# Patient Record
Sex: MALE | Race: WHITE | Employment: OTHER | URBAN - METROPOLITAN AREA
[De-identification: names, ages, dates, MRNs, and addresses within clinical notes are randomized per-mention and may not be internally consistent; named-entity substitution may affect disease eponyms.]

---

## 2019-11-29 ENCOUNTER — APPOINTMENT (OUTPATIENT)
Dept: CT IMAGING | Age: 84
End: 2019-11-29
Payer: MEDICARE

## 2019-11-29 ENCOUNTER — HOSPITAL ENCOUNTER (EMERGENCY)
Age: 84
Discharge: HOME | End: 2019-11-29
Attending: EMERGENCY MEDICINE
Payer: MEDICARE

## 2019-11-29 VITALS
HEIGHT: 69 IN | HEART RATE: 76 BPM | DIASTOLIC BLOOD PRESSURE: 79 MMHG | TEMPERATURE: 98.4 F | OXYGEN SATURATION: 99 % | WEIGHT: 233.69 LBS | BODY MASS INDEX: 34.61 KG/M2 | RESPIRATION RATE: 17 BRPM | SYSTOLIC BLOOD PRESSURE: 151 MMHG

## 2019-11-29 DIAGNOSIS — N30.00 ACUTE CYSTITIS WITHOUT HEMATURIA: Primary | ICD-10-CM

## 2019-11-29 LAB
A/G RATIO: 1.4 (ref 1.1–2.2)
ALBUMIN SERPL-MCNC: 3.6 G/DL (ref 3.4–5)
ALP BLD-CCNC: 63 U/L (ref 40–129)
ALT SERPL-CCNC: 10 U/L (ref 10–40)
ANION GAP SERPL CALCULATED.3IONS-SCNC: 15 MMOL/L (ref 3–16)
AST SERPL-CCNC: 22 U/L (ref 15–37)
BILIRUB SERPL-MCNC: 0.4 MG/DL (ref 0–1)
BILIRUBIN URINE: ABNORMAL
BLOOD, URINE: ABNORMAL
BUN BLDV-MCNC: 15 MG/DL (ref 7–20)
CALCIUM SERPL-MCNC: 8.6 MG/DL (ref 8.3–10.6)
CHLORIDE BLD-SCNC: 104 MMOL/L (ref 99–110)
CLARITY: ABNORMAL
CO2: 23 MMOL/L (ref 21–32)
COLOR: ABNORMAL
CREAT SERPL-MCNC: 0.5 MG/DL (ref 0.8–1.3)
CRYSTALS, UA: ABNORMAL /HPF
EKG ATRIAL RATE: 86 BPM
EKG DIAGNOSIS: NORMAL
EKG P AXIS: 71 DEGREES
EKG P-R INTERVAL: 188 MS
EKG Q-T INTERVAL: 428 MS
EKG QRS DURATION: 154 MS
EKG QTC CALCULATION (BAZETT): 512 MS
EKG R AXIS: 84 DEGREES
EKG T AXIS: 52 DEGREES
EKG VENTRICULAR RATE: 86 BPM
EPITHELIAL CELLS, UA: ABNORMAL /HPF
GFR AFRICAN AMERICAN: >60
GFR NON-AFRICAN AMERICAN: >60
GLOBULIN: 2.5 G/DL
GLUCOSE BLD-MCNC: 119 MG/DL (ref 70–99)
GLUCOSE URINE: NEGATIVE MG/DL
HCT VFR BLD CALC: 38.1 % (ref 40.5–52.5)
HEMOGLOBIN: 12.7 G/DL (ref 13.5–17.5)
HYALINE CASTS: 5 /LPF (ref 0–8)
KETONES, URINE: ABNORMAL MG/DL
LEUKOCYTE ESTERASE, URINE: ABNORMAL
MCH RBC QN AUTO: 32.7 PG (ref 26–34)
MCHC RBC AUTO-ENTMCNC: 33.4 G/DL (ref 31–36)
MCV RBC AUTO: 97.9 FL (ref 80–100)
MICROSCOPIC EXAMINATION: YES
MUCUS: ABNORMAL /LPF
NITRITE, URINE: NEGATIVE
PDW BLD-RTO: 14.6 % (ref 12.4–15.4)
PH UA: 5 (ref 5–8)
PLATELET # BLD: 183 K/UL (ref 135–450)
PMV BLD AUTO: 8.3 FL (ref 5–10.5)
POTASSIUM REFLEX MAGNESIUM: 4.5 MMOL/L (ref 3.5–5.1)
PROTEIN UA: 30 MG/DL
RBC # BLD: 3.89 M/UL (ref 4.2–5.9)
RBC UA: 40 /HPF (ref 0–4)
RENAL EPITHELIAL, UA: ABNORMAL /HPF
SODIUM BLD-SCNC: 142 MMOL/L (ref 136–145)
SPECIFIC GRAVITY UA: 1.03 (ref 1–1.03)
TOTAL PROTEIN: 6.1 G/DL (ref 6.4–8.2)
TROPONIN: <0.01 NG/ML
URINE REFLEX TO CULTURE: YES
URINE TYPE: ABNORMAL
UROBILINOGEN, URINE: 1 E.U./DL
WBC # BLD: 7.5 K/UL (ref 4–11)
WBC UA: 271 /HPF (ref 0–5)

## 2019-11-29 PROCEDURE — 80053 COMPREHEN METABOLIC PANEL: CPT

## 2019-11-29 PROCEDURE — 84484 ASSAY OF TROPONIN QUANT: CPT

## 2019-11-29 PROCEDURE — 70450 CT HEAD/BRAIN W/O DYE: CPT

## 2019-11-29 PROCEDURE — 99284 EMERGENCY DEPT VISIT MOD MDM: CPT

## 2019-11-29 PROCEDURE — 85027 COMPLETE CBC AUTOMATED: CPT

## 2019-11-29 PROCEDURE — 93010 ELECTROCARDIOGRAM REPORT: CPT | Performed by: INTERNAL MEDICINE

## 2019-11-29 PROCEDURE — 87086 URINE CULTURE/COLONY COUNT: CPT

## 2019-11-29 PROCEDURE — 6370000000 HC RX 637 (ALT 250 FOR IP): Performed by: EMERGENCY MEDICINE

## 2019-11-29 PROCEDURE — 93005 ELECTROCARDIOGRAM TRACING: CPT | Performed by: EMERGENCY MEDICINE

## 2019-11-29 PROCEDURE — 36415 COLL VENOUS BLD VENIPUNCTURE: CPT

## 2019-11-29 PROCEDURE — 81001 URINALYSIS AUTO W/SCOPE: CPT

## 2019-11-29 RX ORDER — NITROFURANTOIN 25; 75 MG/1; MG/1
100 CAPSULE ORAL ONCE
Status: COMPLETED | OUTPATIENT
Start: 2019-11-29 | End: 2019-11-29

## 2019-11-29 RX ORDER — NITROFURANTOIN 25; 75 MG/1; MG/1
100 CAPSULE ORAL 2 TIMES DAILY
Qty: 14 CAPSULE | Refills: 0 | Status: SHIPPED | OUTPATIENT
Start: 2019-11-29 | End: 2019-12-03

## 2019-11-29 RX ADMIN — NITROFURANTOIN MONOHYDRATE/MACROCRYSTALLINE 100 MG: 25; 75 CAPSULE ORAL at 03:26

## 2019-11-29 ASSESSMENT — PAIN SCALES - GENERAL
PAINLEVEL_OUTOF10: 0

## 2019-11-30 LAB — URINE CULTURE, ROUTINE: NORMAL

## 2019-12-23 ENCOUNTER — OFFICE VISIT (OUTPATIENT)
Dept: CARDIOLOGY CLINIC | Age: 84
End: 2019-12-23
Payer: MEDICARE

## 2019-12-23 VITALS
OXYGEN SATURATION: 93 % | DIASTOLIC BLOOD PRESSURE: 60 MMHG | HEIGHT: 69 IN | HEART RATE: 77 BPM | SYSTOLIC BLOOD PRESSURE: 142 MMHG | BODY MASS INDEX: 34.51 KG/M2

## 2019-12-23 DIAGNOSIS — G47.30 SLEEP APNEA, UNSPECIFIED TYPE: ICD-10-CM

## 2019-12-23 DIAGNOSIS — R60.0 BILATERAL LEG EDEMA: Primary | ICD-10-CM

## 2019-12-23 DIAGNOSIS — I10 ESSENTIAL HYPERTENSION: ICD-10-CM

## 2019-12-23 DIAGNOSIS — I71.40 ABDOMINAL AORTIC ANEURYSM (AAA) WITHOUT RUPTURE: ICD-10-CM

## 2019-12-23 PROCEDURE — G8484 FLU IMMUNIZE NO ADMIN: HCPCS | Performed by: INTERNAL MEDICINE

## 2019-12-23 PROCEDURE — G8427 DOCREV CUR MEDS BY ELIG CLIN: HCPCS | Performed by: INTERNAL MEDICINE

## 2019-12-23 PROCEDURE — 1036F TOBACCO NON-USER: CPT | Performed by: INTERNAL MEDICINE

## 2019-12-23 PROCEDURE — 1123F ACP DISCUSS/DSCN MKR DOCD: CPT | Performed by: INTERNAL MEDICINE

## 2019-12-23 PROCEDURE — G8417 CALC BMI ABV UP PARAM F/U: HCPCS | Performed by: INTERNAL MEDICINE

## 2019-12-23 PROCEDURE — 99204 OFFICE O/P NEW MOD 45 MIN: CPT | Performed by: INTERNAL MEDICINE

## 2019-12-23 PROCEDURE — 4040F PNEUMOC VAC/ADMIN/RCVD: CPT | Performed by: INTERNAL MEDICINE

## 2019-12-23 RX ORDER — ALENDRONATE SODIUM 70 MG/1
70 TABLET ORAL
COMMUNITY
End: 2021-07-28

## 2019-12-23 RX ORDER — FUROSEMIDE 40 MG/1
40 TABLET ORAL DAILY
Qty: 30 TABLET | Refills: 5 | Status: SHIPPED | OUTPATIENT
Start: 2019-12-23 | End: 2020-06-12

## 2019-12-23 RX ORDER — FUROSEMIDE 20 MG/1
20 TABLET ORAL DAILY
COMMUNITY
End: 2019-12-23

## 2019-12-23 RX ORDER — LOSARTAN POTASSIUM 50 MG/1
50 TABLET ORAL 2 TIMES DAILY
COMMUNITY
End: 2019-12-23 | Stop reason: ALTCHOICE

## 2019-12-23 RX ORDER — OLMESARTAN MEDOXOMIL AND HYDROCHLOROTHIAZIDE 20/12.5 20; 12.5 MG/1; MG/1
1 TABLET ORAL 2 TIMES DAILY
Qty: 60 TABLET | Refills: 11 | Status: SHIPPED | OUTPATIENT
Start: 2019-12-23 | End: 2021-01-13 | Stop reason: SDUPTHER

## 2019-12-26 ENCOUNTER — TELEPHONE (OUTPATIENT)
Dept: CARDIOLOGY CLINIC | Age: 84
End: 2019-12-26

## 2019-12-26 LAB
ANION GAP SERPL CALCULATED.3IONS-SCNC: 7 MMOL/L (ref 6–18)
B-TYPE NATRIURETIC PEPTIDE: 46 PG/ML
B-TYPE NATRIURETIC PEPTIDE: 46 PG/ML (ref 0–77)
BUN BLDV-MCNC: 17 MG/DL
BUN BLDV-MCNC: 17 MG/DL (ref 8–26)
CALCIUM SERPL-MCNC: 8.9 MG/DL
CALCIUM SERPL-MCNC: 8.9 MG/DL (ref 8.5–10.5)
CHLORIDE BLD-SCNC: 103 MMOL/L
CHLORIDE BLD-SCNC: 104 MEQ/L (ref 101–111)
CO2: 32 MMOL/L
CO2: 32 MMOL/L (ref 24–36)
CREAT SERPL-MCNC: 0.65 MG/DL
CREAT SERPL-MCNC: 0.65 MG/DL (ref 0.64–1.27)
GFR AFRICAN AMERICAN: 102 ML/MIN/1.73 M2
GFR CALCULATED: NORMAL
GFR NON-AFRICAN AMERICAN: 88 ML/MIN/1.73 M2
GLUCOSE BLD-MCNC: 91 MG/DL
GLUCOSE BLD-MCNC: 91 MG/DL (ref 70–99)
POTASSIUM SERPL-SCNC: 3.3 MEQ/L (ref 3.6–5.1)
POTASSIUM SERPL-SCNC: 3.3 MMOL/L
SODIUM BLD-SCNC: 143 MEQ/L (ref 135–145)
SODIUM BLD-SCNC: 143 MMOL/L

## 2019-12-26 RX ORDER — POTASSIUM CHLORIDE 20 MEQ/1
20 TABLET, EXTENDED RELEASE ORAL DAILY
Qty: 30 TABLET | Refills: 3 | Status: SHIPPED | OUTPATIENT
Start: 2019-12-26 | End: 2020-02-11 | Stop reason: SDUPTHER

## 2019-12-27 ENCOUNTER — TELEPHONE (OUTPATIENT)
Dept: CARDIOLOGY CLINIC | Age: 84
End: 2019-12-27

## 2019-12-27 DIAGNOSIS — I10 ESSENTIAL HYPERTENSION: Primary | ICD-10-CM

## 2019-12-30 ENCOUNTER — APPOINTMENT (OUTPATIENT)
Dept: CT IMAGING | Age: 84
DRG: 871 | End: 2019-12-30
Payer: MEDICARE

## 2019-12-30 ENCOUNTER — APPOINTMENT (OUTPATIENT)
Dept: GENERAL RADIOLOGY | Age: 84
DRG: 871 | End: 2019-12-30
Payer: MEDICARE

## 2019-12-30 ENCOUNTER — HOSPITAL ENCOUNTER (INPATIENT)
Age: 84
LOS: 5 days | Discharge: HOME HEALTH | DRG: 871 | End: 2020-01-04
Attending: EMERGENCY MEDICINE | Admitting: INTERNAL MEDICINE
Payer: MEDICARE

## 2019-12-30 PROBLEM — R79.89 ELEVATED LACTIC ACID LEVEL: Status: ACTIVE | Noted: 2019-12-30

## 2019-12-30 PROBLEM — R53.1 GENERALIZED WEAKNESS: Status: ACTIVE | Noted: 2019-12-30

## 2019-12-30 PROBLEM — G93.41 ACUTE METABOLIC ENCEPHALOPATHY: Status: ACTIVE | Noted: 2019-12-30

## 2019-12-30 PROBLEM — E87.6 HYPOKALEMIA: Status: ACTIVE | Noted: 2019-12-30

## 2019-12-30 PROBLEM — E87.29 HIGH ANION GAP METABOLIC ACIDOSIS: Status: ACTIVE | Noted: 2019-12-30

## 2019-12-30 PROBLEM — A41.9 SEPSIS (HCC): Status: ACTIVE | Noted: 2019-12-30

## 2019-12-30 LAB
ALBUMIN SERPL-MCNC: 4.1 G/DL (ref 3.4–5)
ALP BLD-CCNC: 70 U/L (ref 40–129)
ALT SERPL-CCNC: 12 U/L (ref 10–40)
ANION GAP SERPL CALCULATED.3IONS-SCNC: 15 MMOL/L (ref 3–16)
AST SERPL-CCNC: 14 U/L (ref 15–37)
BASOPHILS ABSOLUTE: 0 K/UL (ref 0–0.2)
BASOPHILS RELATIVE PERCENT: 0.1 %
BILIRUB SERPL-MCNC: 1.2 MG/DL (ref 0–1)
BILIRUBIN DIRECT: 0.4 MG/DL (ref 0–0.3)
BILIRUBIN URINE: NEGATIVE
BILIRUBIN, INDIRECT: 0.8 MG/DL (ref 0–1)
BLOOD, URINE: NEGATIVE
BUN BLDV-MCNC: 15 MG/DL (ref 7–20)
CALCIUM SERPL-MCNC: 9.2 MG/DL (ref 8.3–10.6)
CHLORIDE BLD-SCNC: 97 MMOL/L (ref 99–110)
CLARITY: CLEAR
CO2: 28 MMOL/L (ref 21–32)
COLOR: YELLOW
CREAT SERPL-MCNC: 0.6 MG/DL (ref 0.8–1.3)
EOSINOPHILS ABSOLUTE: 0 K/UL (ref 0–0.6)
EOSINOPHILS RELATIVE PERCENT: 0.1 %
EPITHELIAL CELLS, UA: 1 /HPF (ref 0–5)
GFR AFRICAN AMERICAN: >60
GFR NON-AFRICAN AMERICAN: >60
GLUCOSE BLD-MCNC: 164 MG/DL (ref 70–99)
GLUCOSE URINE: NEGATIVE MG/DL
HCT VFR BLD CALC: 40.4 % (ref 40.5–52.5)
HEMOGLOBIN: 13.2 G/DL (ref 13.5–17.5)
HYALINE CASTS: 0 /LPF (ref 0–8)
KETONES, URINE: ABNORMAL MG/DL
LACTIC ACID: 3.2 MMOL/L (ref 0.4–2)
LACTIC ACID: 3.2 MMOL/L (ref 0.4–2)
LEUKOCYTE ESTERASE, URINE: ABNORMAL
LIPASE: 13 U/L (ref 13–60)
LYMPHOCYTES ABSOLUTE: 0.3 K/UL (ref 1–5.1)
LYMPHOCYTES RELATIVE PERCENT: 2.8 %
MAGNESIUM: 2 MG/DL (ref 1.8–2.4)
MCH RBC QN AUTO: 32.4 PG (ref 26–34)
MCHC RBC AUTO-ENTMCNC: 32.7 G/DL (ref 31–36)
MCV RBC AUTO: 99 FL (ref 80–100)
MICROSCOPIC EXAMINATION: YES
MONOCYTES ABSOLUTE: 1 K/UL (ref 0–1.3)
MONOCYTES RELATIVE PERCENT: 8 %
NEUTROPHILS ABSOLUTE: 10.7 K/UL (ref 1.7–7.7)
NEUTROPHILS RELATIVE PERCENT: 89 %
NITRITE, URINE: NEGATIVE
PDW BLD-RTO: 14.7 % (ref 12.4–15.4)
PH UA: 5 (ref 5–8)
PLATELET # BLD: 181 K/UL (ref 135–450)
PMV BLD AUTO: 8 FL (ref 5–10.5)
POTASSIUM REFLEX MAGNESIUM: 3.4 MMOL/L (ref 3.5–5.1)
PROTEIN UA: NEGATIVE MG/DL
RAPID INFLUENZA  B AGN: NEGATIVE
RAPID INFLUENZA A AGN: NEGATIVE
RBC # BLD: 4.09 M/UL (ref 4.2–5.9)
RBC UA: 2 /HPF (ref 0–4)
SODIUM BLD-SCNC: 140 MMOL/L (ref 136–145)
SPECIFIC GRAVITY UA: 1.01 (ref 1–1.03)
TOTAL PROTEIN: 7 G/DL (ref 6.4–8.2)
URINE REFLEX TO CULTURE: YES
URINE TYPE: ABNORMAL
UROBILINOGEN, URINE: 1 E.U./DL
WBC # BLD: 12 K/UL (ref 4–11)
WBC UA: 3 /HPF (ref 0–5)

## 2019-12-30 PROCEDURE — 71046 X-RAY EXAM CHEST 2 VIEWS: CPT

## 2019-12-30 PROCEDURE — 83690 ASSAY OF LIPASE: CPT

## 2019-12-30 PROCEDURE — 2580000003 HC RX 258: Performed by: EMERGENCY MEDICINE

## 2019-12-30 PROCEDURE — 6370000000 HC RX 637 (ALT 250 FOR IP): Performed by: INTERNAL MEDICINE

## 2019-12-30 PROCEDURE — 2580000003 HC RX 258: Performed by: INTERNAL MEDICINE

## 2019-12-30 PROCEDURE — 81001 URINALYSIS AUTO W/SCOPE: CPT

## 2019-12-30 PROCEDURE — 70450 CT HEAD/BRAIN W/O DYE: CPT

## 2019-12-30 PROCEDURE — 71250 CT THORAX DX C-: CPT

## 2019-12-30 PROCEDURE — 87040 BLOOD CULTURE FOR BACTERIA: CPT

## 2019-12-30 PROCEDURE — 83605 ASSAY OF LACTIC ACID: CPT

## 2019-12-30 PROCEDURE — 87804 INFLUENZA ASSAY W/OPTIC: CPT

## 2019-12-30 PROCEDURE — 96375 TX/PRO/DX INJ NEW DRUG ADDON: CPT

## 2019-12-30 PROCEDURE — 80048 BASIC METABOLIC PNL TOTAL CA: CPT

## 2019-12-30 PROCEDURE — 99285 EMERGENCY DEPT VISIT HI MDM: CPT

## 2019-12-30 PROCEDURE — 87150 DNA/RNA AMPLIFIED PROBE: CPT

## 2019-12-30 PROCEDURE — 85025 COMPLETE CBC W/AUTO DIFF WBC: CPT

## 2019-12-30 PROCEDURE — 80076 HEPATIC FUNCTION PANEL: CPT

## 2019-12-30 PROCEDURE — 6360000002 HC RX W HCPCS: Performed by: EMERGENCY MEDICINE

## 2019-12-30 PROCEDURE — 94640 AIRWAY INHALATION TREATMENT: CPT

## 2019-12-30 PROCEDURE — 83735 ASSAY OF MAGNESIUM: CPT

## 2019-12-30 PROCEDURE — 94761 N-INVAS EAR/PLS OXIMETRY MLT: CPT

## 2019-12-30 PROCEDURE — 6370000000 HC RX 637 (ALT 250 FOR IP): Performed by: EMERGENCY MEDICINE

## 2019-12-30 PROCEDURE — 96361 HYDRATE IV INFUSION ADD-ON: CPT

## 2019-12-30 PROCEDURE — 51702 INSERT TEMP BLADDER CATH: CPT

## 2019-12-30 PROCEDURE — 36415 COLL VENOUS BLD VENIPUNCTURE: CPT

## 2019-12-30 PROCEDURE — 2060000000 HC ICU INTERMEDIATE R&B

## 2019-12-30 PROCEDURE — 87086 URINE CULTURE/COLONY COUNT: CPT

## 2019-12-30 PROCEDURE — 96365 THER/PROPH/DIAG IV INF INIT: CPT

## 2019-12-30 RX ORDER — SODIUM CHLORIDE 0.9 % (FLUSH) 0.9 %
10 SYRINGE (ML) INJECTION PRN
Status: DISCONTINUED | OUTPATIENT
Start: 2019-12-30 | End: 2020-01-04 | Stop reason: HOSPADM

## 2019-12-30 RX ORDER — AMLODIPINE BESYLATE 5 MG/1
5 TABLET ORAL DAILY
Status: DISCONTINUED | OUTPATIENT
Start: 2019-12-31 | End: 2019-12-31

## 2019-12-30 RX ORDER — SODIUM CHLORIDE 9 MG/ML
INJECTION, SOLUTION INTRAVENOUS CONTINUOUS
Status: DISCONTINUED | OUTPATIENT
Start: 2019-12-30 | End: 2019-12-31

## 2019-12-30 RX ORDER — POTASSIUM CHLORIDE 750 MG/1
10 TABLET, FILM COATED, EXTENDED RELEASE ORAL DAILY
Status: DISCONTINUED | OUTPATIENT
Start: 2019-12-31 | End: 2020-01-04 | Stop reason: HOSPADM

## 2019-12-30 RX ORDER — FINASTERIDE 5 MG/1
5 TABLET, FILM COATED ORAL DAILY
Status: DISCONTINUED | OUTPATIENT
Start: 2019-12-31 | End: 2019-12-31 | Stop reason: ALTCHOICE

## 2019-12-30 RX ORDER — LOSARTAN POTASSIUM AND HYDROCHLOROTHIAZIDE 12.5; 5 MG/1; MG/1
1 TABLET ORAL 2 TIMES DAILY
Status: DISCONTINUED | OUTPATIENT
Start: 2019-12-30 | End: 2020-01-01

## 2019-12-30 RX ORDER — LANOLIN ALCOHOL/MO/W.PET/CERES
1000 CREAM (GRAM) TOPICAL DAILY
Status: DISCONTINUED | OUTPATIENT
Start: 2019-12-31 | End: 2020-01-04 | Stop reason: HOSPADM

## 2019-12-30 RX ORDER — PREDNISONE 20 MG/1
60 TABLET ORAL ONCE
Status: COMPLETED | OUTPATIENT
Start: 2019-12-30 | End: 2019-12-30

## 2019-12-30 RX ORDER — LOVASTATIN 20 MG/1
20 TABLET ORAL NIGHTLY
Status: ON HOLD | COMMUNITY
End: 2022-08-03 | Stop reason: HOSPADM

## 2019-12-30 RX ORDER — ALBUTEROL SULFATE 90 UG/1
2 AEROSOL, METERED RESPIRATORY (INHALATION) PRN
Status: DISCONTINUED | OUTPATIENT
Start: 2019-12-30 | End: 2020-01-03

## 2019-12-30 RX ORDER — 0.9 % SODIUM CHLORIDE 0.9 %
1000 INTRAVENOUS SOLUTION INTRAVENOUS ONCE
Status: COMPLETED | OUTPATIENT
Start: 2019-12-30 | End: 2019-12-30

## 2019-12-30 RX ORDER — OSELTAMIVIR PHOSPHATE 75 MG/1
75 CAPSULE ORAL 2 TIMES DAILY
Status: DISCONTINUED | OUTPATIENT
Start: 2019-12-31 | End: 2020-01-02

## 2019-12-30 RX ORDER — POLYETHYLENE GLYCOL 3350 17 G/17G
17 POWDER, FOR SOLUTION ORAL DAILY
Status: DISCONTINUED | OUTPATIENT
Start: 2019-12-31 | End: 2020-01-04 | Stop reason: HOSPADM

## 2019-12-30 RX ORDER — ASPIRIN 81 MG/1
81 TABLET ORAL DAILY
Status: DISCONTINUED | OUTPATIENT
Start: 2019-12-31 | End: 2020-01-04 | Stop reason: HOSPADM

## 2019-12-30 RX ORDER — CALCIUM CARBONATE 500(1250)
500 TABLET ORAL 2 TIMES DAILY
Status: DISCONTINUED | OUTPATIENT
Start: 2019-12-31 | End: 2020-01-04 | Stop reason: HOSPADM

## 2019-12-30 RX ORDER — OLMESARTAN MEDOXOMIL AND HYDROCHLOROTHIAZIDE 20/12.5 20; 12.5 MG/1; MG/1
1 TABLET ORAL 2 TIMES DAILY
Status: DISCONTINUED | OUTPATIENT
Start: 2019-12-30 | End: 2019-12-30 | Stop reason: CLARIF

## 2019-12-30 RX ORDER — SIMVASTATIN 20 MG
20 TABLET ORAL NIGHTLY
Status: DISCONTINUED | OUTPATIENT
Start: 2019-12-30 | End: 2020-01-04 | Stop reason: HOSPADM

## 2019-12-30 RX ORDER — OSELTAMIVIR PHOSPHATE 75 MG/1
75 CAPSULE ORAL ONCE
Status: COMPLETED | OUTPATIENT
Start: 2019-12-30 | End: 2019-12-30

## 2019-12-30 RX ORDER — IPRATROPIUM BROMIDE AND ALBUTEROL SULFATE 2.5; .5 MG/3ML; MG/3ML
1 SOLUTION RESPIRATORY (INHALATION)
Status: DISCONTINUED | OUTPATIENT
Start: 2019-12-31 | End: 2020-01-01

## 2019-12-30 RX ORDER — ALBUTEROL SULFATE 2.5 MG/3ML
5 SOLUTION RESPIRATORY (INHALATION) ONCE
Status: COMPLETED | OUTPATIENT
Start: 2019-12-30 | End: 2019-12-30

## 2019-12-30 RX ORDER — SODIUM CHLORIDE 0.9 % (FLUSH) 0.9 %
10 SYRINGE (ML) INJECTION EVERY 12 HOURS SCHEDULED
Status: DISCONTINUED | OUTPATIENT
Start: 2019-12-30 | End: 2020-01-04 | Stop reason: HOSPADM

## 2019-12-30 RX ORDER — ONDANSETRON 2 MG/ML
4 INJECTION INTRAMUSCULAR; INTRAVENOUS EVERY 6 HOURS PRN
Status: DISCONTINUED | OUTPATIENT
Start: 2019-12-30 | End: 2020-01-04 | Stop reason: HOSPADM

## 2019-12-30 RX ORDER — POTASSIUM CHLORIDE 750 MG/1
40 TABLET, FILM COATED, EXTENDED RELEASE ORAL ONCE
Status: COMPLETED | OUTPATIENT
Start: 2019-12-30 | End: 2019-12-30

## 2019-12-30 RX ADMIN — SIMVASTATIN 20 MG: 20 TABLET, FILM COATED ORAL at 23:13

## 2019-12-30 RX ADMIN — OSELTAMIVIR PHOSPHATE 75 MG: 75 CAPSULE ORAL at 18:37

## 2019-12-30 RX ADMIN — LOSARTAN POTASSIUM AND HYDROCHLOROTHIAZIDE 1 TABLET: 50; 12.5 TABLET, FILM COATED ORAL at 23:13

## 2019-12-30 RX ADMIN — ALBUTEROL SULFATE 5 MG: 2.5 SOLUTION RESPIRATORY (INHALATION) at 18:08

## 2019-12-30 RX ADMIN — AZITHROMYCIN MONOHYDRATE 500 MG: 500 INJECTION, POWDER, LYOPHILIZED, FOR SOLUTION INTRAVENOUS at 20:53

## 2019-12-30 RX ADMIN — SODIUM CHLORIDE 1000 ML: 9 INJECTION, SOLUTION INTRAVENOUS at 19:15

## 2019-12-30 RX ADMIN — SODIUM CHLORIDE 1000 ML: 9 INJECTION, SOLUTION INTRAVENOUS at 18:20

## 2019-12-30 RX ADMIN — SODIUM CHLORIDE, PRESERVATIVE FREE 10 ML: 5 INJECTION INTRAVENOUS at 22:45

## 2019-12-30 RX ADMIN — SODIUM CHLORIDE 50 ML/HR: 9 INJECTION, SOLUTION INTRAVENOUS at 22:45

## 2019-12-30 RX ADMIN — CEFTRIAXONE 1 G: 1 INJECTION, POWDER, FOR SOLUTION INTRAMUSCULAR; INTRAVENOUS at 18:22

## 2019-12-30 RX ADMIN — POTASSIUM CHLORIDE 40 MEQ: 750 TABLET, FILM COATED, EXTENDED RELEASE ORAL at 18:37

## 2019-12-30 RX ADMIN — PREDNISONE 60 MG: 20 TABLET ORAL at 18:22

## 2019-12-30 RX ADMIN — SODIUM CHLORIDE 1000 ML: 9 INJECTION, SOLUTION INTRAVENOUS at 20:53

## 2019-12-30 ASSESSMENT — ENCOUNTER SYMPTOMS
ABDOMINAL DISTENTION: 0
EYE ITCHING: 0
STRIDOR: 0
APNEA: 0
BACK PAIN: 0
BLOOD IN STOOL: 0
NAUSEA: 0
CHOKING: 0
CHEST TIGHTNESS: 0
CONSTIPATION: 0
DIARRHEA: 0
WHEEZING: 0
ANAL BLEEDING: 0
EYE REDNESS: 0
RECTAL PAIN: 0
EYE PAIN: 0
EYE DISCHARGE: 0
ABDOMINAL PAIN: 0
VOMITING: 0
COUGH: 1
PHOTOPHOBIA: 0
COLOR CHANGE: 0

## 2019-12-30 ASSESSMENT — PAIN SCALES - GENERAL
PAINLEVEL_OUTOF10: 0
PAINLEVEL_OUTOF10: 0

## 2019-12-30 NOTE — ED PROVIDER NOTES
[facility]  eMERGENCY dEPARTMENT eNCOUnter      Pt Name: Evelia Madera  MRN: [id number]  [place] 1935  Date of evaluation: 12/30/2019  Provider: Tess Sharma MD    CHIEF COMPLAINT       Chief Complaint   Patient presents with    Fever       HISTORY OF PRESENT ILLNESS    Evelia Madera is a 82 y.o. male who presents to the emergency department with fever. Patient endorses chills and productive cough x 1 week. Home health visited and found temp to be 101 and patinet a little bit more confused than usual. No SOB or chest pain. Per Home Health and family patient with some confusion as well. No other associated symptoms. Nursing Notes were reviewed. REVIEW OF SYSTEMS       Review of Systems   Constitutional: Positive for chills and fever. Negative for diaphoresis, fatigue and unexpected weight change. HENT: Negative for congestion, dental problem, drooling, ear discharge and ear pain. Eyes: Negative for photophobia, pain, discharge, redness, itching and visual disturbance. Respiratory: Positive for cough. Negative for apnea, choking, chest tightness, wheezing and stridor. Cardiovascular: Negative for chest pain, palpitations and leg swelling. Gastrointestinal: Negative for abdominal distention, abdominal pain, anal bleeding, blood in stool, constipation, diarrhea, nausea, rectal pain and vomiting. Endocrine: Negative for cold intolerance and heat intolerance. Genitourinary: Negative for decreased urine volume and urgency. Musculoskeletal: Negative for arthralgias and back pain. Skin: Negative for color change and pallor. Neurological: Negative for dizziness and facial asymmetry. Hematological: Negative for adenopathy. Does not bruise/bleed easily. Psychiatric/Behavioral: Positive for confusion. Negative for agitation, behavioral problems and decreased concentration.      Except as noted above the remainder of the review of systems was education level: None   Occupational History    None   Social Needs    Financial resource strain: None    Food insecurity:     Worry: None     Inability: None    Transportation needs:     Medical: None     Non-medical: None   Tobacco Use    Smoking status: Former Smoker    Smokeless tobacco: Never Used   Substance and Sexual Activity    Alcohol use: Yes     Comment: occ    Drug use: No    Sexual activity: Not Currently   Lifestyle    Physical activity:     Days per week: None     Minutes per session: None    Stress: None   Relationships    Social connections:     Talks on phone: None     Gets together: None     Attends Buddhist service: None     Active member of club or organization: None     Attends meetings of clubs or organizations: None     Relationship status: None    Intimate partner violence:     Fear of current or ex partner: None     Emotionally abused: None     Physically abused: None     Forced sexual activity: None   Other Topics Concern    None   Social History Narrative    None       PHYSICAL EXAM       ED Triage Vitals [12/30/19 1701]   BP Temp Temp Source Pulse Resp SpO2 Height Weight   114/68 100 °F (37.8 °C) Oral 112 20 95 % -- 236 lb 5.3 oz (107.2 kg)       Physical Exam  Constitutional:       General: He is not in acute distress. Appearance: He is well-developed. He is not diaphoretic. HENT:      Head: Normocephalic and atraumatic. Eyes:      General:         Right eye: No discharge. Left eye: No discharge. Pupils: Pupils are equal, round, and reactive to light. Neck:      Musculoskeletal: Normal range of motion. Thyroid: No thyromegaly. Trachea: No tracheal deviation. Cardiovascular:      Rate and Rhythm: Regular rhythm. Tachycardia present. Heart sounds: No murmur. Pulmonary:      Breath sounds: No wheezing or rales. Chest:      Chest wall: No tenderness. Abdominal:      General: There is no distension.       Palpations: Abdomen is soft. There is no mass. Tenderness: There is no tenderness. There is no guarding or rebound. Musculoskeletal: Normal range of motion. General: No tenderness or deformity. Skin:     General: Skin is warm. Coloration: Skin is not pale. Findings: No erythema or rash. Neurological:      Mental Status: He is alert. Cranial Nerves: No cranial nerve deficit. Motor: No abnormal muscle tone.       Coordination: Coordination normal.      Comments: A&Ox 4            DIAGNOSTIC RESULTS     EKG: All EKG's are interpreted by the Emergency Department Physician who either signs or Co-signs this chart in the absence of acardiologist.    None    RADIOLOGY:   Non-plain film images such as CT, Ultrasoundand MRI are read by the radiologist. Plain radiographic images are visualized and preliminarily interpreted by the emergency physician with the below findings:    CXR shows possible LLL opacity     ED BEDSIDE ULTRASOUND:   Performed by ED Physician - none    LABS:  Labs Reviewed   CBC WITH AUTO DIFFERENTIAL - Abnormal; Notable for the following components:       Result Value    WBC 12.0 (*)     RBC 4.09 (*)     Hemoglobin 13.2 (*)     Hematocrit 40.4 (*)     Neutrophils Absolute 10.7 (*)     Lymphocytes Absolute 0.3 (*)     All other components within normal limits    Narrative:     Performed at:  [facility]  [address] [place], [place] [address]   Phone [phone]   BASIC METABOLIC PANEL W/ REFLEX TO MG FOR LOW K - Abnormal; Notable for the following components:    Potassium reflex Magnesium 3.4 (*)     Chloride 97 (*)     Glucose 164 (*)     CREATININE 0.6 (*)     All other components within normal limits    Narrative:     Performed at:  [facility]  [address] [place], [place] [address]   Phone [phone]   LACTIC ACID, PLASMA - Abnormal; Notable for the following components:    Lactic Acid 3.2 (*)     All other

## 2019-12-30 NOTE — H&P
Hospital Medicine History & Physical      PCP: Dora Roldan    Date of Admission: 12/30/2019    Date of Service: Pt seen/examined on 12/30/20`9 and Admitted to Inpatient with expected LOS greater than two midnights due to medical therapy. Chief Complaint:  Cough, weakness, fever      History Of Present Illness:      82 y.o. male who presented to [facility] with 1 day history of worsening cough associated with mild shortness of breath fever no obvious alleviating or aggravating factors in the context of recent sick contact, denies abdominal pain nausea or vomiting denies chest pain stated that he had a sick contact a week ago or so in emergency department tested negative for influenza, chest x-ray worrisome for infiltrate, patient being admitted for further management and treatment. Patient wife also complained that he did have episodes of confusion. Wife and son at bedside. Past Medical History:          Diagnosis Date    Arthritis     CAD (coronary artery disease)     Hyperlipidemia     Hypertension     Prostate cancer ([facility] [address]        Past Surgical History:          Procedure Laterality Date    BACK SURGERY      CARDIAC SURGERY         Medications Prior to Admission:      Prior to Admission medications    Medication Sig Start Date End Date Taking?  Authorizing Provider   potassium chloride (KLOR-CON M) 20 MEQ extended release tablet Take 1 tablet by mouth daily 12/26/19   Sarahy Torres MD   Potassium Chloride (KLOR-CON 10 PO) Take by mouth daily    Historical Provider, MD   alendronate (FOSAMAX) 70 MG tablet Take 70 mg by mouth every 7 days    Historical Provider, MD   olmesartan-hydrochlorothiazide (BENICAR HCT) 20-12.5 MG per tablet Take 1 tablet by mouth 2 times daily 12/23/19   Sarahy Torres MD   furosemide (LASIX) 40 MG tablet Take 1 tablet by mouth daily 12/23/19   Sarahy Torres MD   lovastatin (ALTOPREV) 40 MG ER tablet Take 40 mg by mouth nightly    Historical Provider, pneumonia at this time I am suspicious for influenza is high even though he tested negative I will start Tamiflu, I will do a viral culture, will follow up on that if that is negative consider discontinuation of Tamiflu. Will repeat CBC in a.m., plan of care discussed with patient and wife in details, all questions answered. To note that CT scan of his chest without contrast ordered and pending. 2.  Likely pneumonia, IV antibiotics ceftriaxone and azithromycin at this time CT chest pending  3. Suspecting influenza, recent sick contact, tested initially negative, will do viral culture started on Tamiflu  4. High anion gap metabolic acidosis, IV fluid  5. Evaded lactic acid, IV fluid, will trend lactic acid. 6.  Acute metabolic encephalopathy, due to above, monitor clinically expecting improvement. 7.  Generalized weakness, due to above PT OT.  8.  Hypokalemia will replace  9. 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 If you have any questions or concerns please feel free to contact me at [phone].

## 2019-12-30 NOTE — ED NOTES
Bed: -09  Expected date:   Expected time:   Means of arrival: [facility]  Comments:  82 M  Fever  Altered mental status.      Hector Dewey RN  12/30/19 1080

## 2019-12-30 NOTE — ED TRIAGE NOTES
Pt arrived to the ED via EMS from home. Pt lives at home with wife. Per EMS a nurse came over to do a visit and noticed him in his wheelchair confused. They stated he was normal this morning. They stated he usually knows how to use his wheelchair and he was confused on how to use it. They stated he had a fever at home of 101. Upon arrival oral temp was 100.0. pt is warm to touch. Pt is answering all of my questions appropriately at this time.  Pt denies pain

## 2019-12-31 ENCOUNTER — APPOINTMENT (OUTPATIENT)
Dept: GENERAL RADIOLOGY | Age: 84
DRG: 871 | End: 2019-12-31
Payer: MEDICARE

## 2019-12-31 LAB
A/G RATIO: 1.4 (ref 1.1–2.2)
ALBUMIN SERPL-MCNC: 3.6 G/DL (ref 3.4–5)
ALP BLD-CCNC: 54 U/L (ref 40–129)
ALT SERPL-CCNC: 12 U/L (ref 10–40)
ANION GAP SERPL CALCULATED.3IONS-SCNC: 15 MMOL/L (ref 3–16)
AST SERPL-CCNC: 15 U/L (ref 15–37)
BASOPHILS ABSOLUTE: 0 K/UL (ref 0–0.2)
BASOPHILS RELATIVE PERCENT: 0.2 %
BILIRUB SERPL-MCNC: 1.1 MG/DL (ref 0–1)
BUN BLDV-MCNC: 13 MG/DL (ref 7–20)
CALCIUM SERPL-MCNC: 8.2 MG/DL (ref 8.3–10.6)
CHLORIDE BLD-SCNC: 102 MMOL/L (ref 99–110)
CO2: 24 MMOL/L (ref 21–32)
CREAT SERPL-MCNC: <0.5 MG/DL (ref 0.8–1.3)
EOSINOPHILS ABSOLUTE: 0 K/UL (ref 0–0.6)
EOSINOPHILS RELATIVE PERCENT: 0 %
GFR AFRICAN AMERICAN: >60
GFR NON-AFRICAN AMERICAN: >60
GLOBULIN: 2.5 G/DL
GLUCOSE BLD-MCNC: 158 MG/DL (ref 70–99)
HCT VFR BLD CALC: 36.5 % (ref 40.5–52.5)
HEMOGLOBIN: 12.1 G/DL (ref 13.5–17.5)
LACTIC ACID: 1.7 MMOL/L (ref 0.4–2)
LACTIC ACID: 1.9 MMOL/L (ref 0.4–2)
LACTIC ACID: 3.2 MMOL/L (ref 0.4–2)
LACTIC ACID: 4.2 MMOL/L (ref 0.4–2)
LYMPHOCYTES ABSOLUTE: 0.6 K/UL (ref 1–5.1)
LYMPHOCYTES RELATIVE PERCENT: 4.9 %
MAGNESIUM: 2.1 MG/DL (ref 1.8–2.4)
MCH RBC QN AUTO: 32.7 PG (ref 26–34)
MCHC RBC AUTO-ENTMCNC: 33.2 G/DL (ref 31–36)
MCV RBC AUTO: 98.5 FL (ref 80–100)
MONOCYTES ABSOLUTE: 0.5 K/UL (ref 0–1.3)
MONOCYTES RELATIVE PERCENT: 3.8 %
NEUTROPHILS ABSOLUTE: 11.4 K/UL (ref 1.7–7.7)
NEUTROPHILS RELATIVE PERCENT: 91.1 %
PDW BLD-RTO: 15 % (ref 12.4–15.4)
PLATELET # BLD: 168 K/UL (ref 135–450)
PMV BLD AUTO: 8 FL (ref 5–10.5)
POTASSIUM REFLEX MAGNESIUM: 3.1 MMOL/L (ref 3.5–5.1)
POTASSIUM SERPL-SCNC: 3.8 MMOL/L (ref 3.5–5.1)
PROCALCITONIN: 1.02 NG/ML (ref 0–0.15)
RBC # BLD: 3.71 M/UL (ref 4.2–5.9)
REPORT: NORMAL
RESPIRATORY PANEL PCR: NORMAL
SODIUM BLD-SCNC: 141 MMOL/L (ref 136–145)
TOTAL PROTEIN: 6.1 G/DL (ref 6.4–8.2)
WBC # BLD: 12.5 K/UL (ref 4–11)

## 2019-12-31 PROCEDURE — 92526 ORAL FUNCTION THERAPY: CPT

## 2019-12-31 PROCEDURE — 0100U HC RESPIRPTHGN MULT REV TRANS & AMP PRB TECH 21 TRGT: CPT

## 2019-12-31 PROCEDURE — 6360000002 HC RX W HCPCS: Performed by: INTERNAL MEDICINE

## 2019-12-31 PROCEDURE — 92610 EVALUATE SWALLOWING FUNCTION: CPT

## 2019-12-31 PROCEDURE — 97166 OT EVAL MOD COMPLEX 45 MIN: CPT

## 2019-12-31 PROCEDURE — 97530 THERAPEUTIC ACTIVITIES: CPT

## 2019-12-31 PROCEDURE — 74230 X-RAY XM SWLNG FUNCJ C+: CPT

## 2019-12-31 PROCEDURE — 36415 COLL VENOUS BLD VENIPUNCTURE: CPT

## 2019-12-31 PROCEDURE — 85025 COMPLETE CBC W/AUTO DIFF WBC: CPT

## 2019-12-31 PROCEDURE — 97161 PT EVAL LOW COMPLEX 20 MIN: CPT | Performed by: PHYSICAL THERAPIST

## 2019-12-31 PROCEDURE — 6370000000 HC RX 637 (ALT 250 FOR IP): Performed by: INTERNAL MEDICINE

## 2019-12-31 PROCEDURE — 2580000003 HC RX 258: Performed by: INTERNAL MEDICINE

## 2019-12-31 PROCEDURE — 80053 COMPREHEN METABOLIC PANEL: CPT

## 2019-12-31 PROCEDURE — 83605 ASSAY OF LACTIC ACID: CPT

## 2019-12-31 PROCEDURE — 97530 THERAPEUTIC ACTIVITIES: CPT | Performed by: PHYSICAL THERAPIST

## 2019-12-31 PROCEDURE — 2060000000 HC ICU INTERMEDIATE R&B

## 2019-12-31 PROCEDURE — 84145 PROCALCITONIN (PCT): CPT

## 2019-12-31 PROCEDURE — 87070 CULTURE OTHR SPECIMN AEROBIC: CPT

## 2019-12-31 PROCEDURE — 83735 ASSAY OF MAGNESIUM: CPT

## 2019-12-31 PROCEDURE — 92611 MOTION FLUOROSCOPY/SWALLOW: CPT

## 2019-12-31 PROCEDURE — 87205 SMEAR GRAM STAIN: CPT

## 2019-12-31 PROCEDURE — 2580000003 HC RX 258: Performed by: HOSPITALIST

## 2019-12-31 PROCEDURE — 94640 AIRWAY INHALATION TREATMENT: CPT

## 2019-12-31 PROCEDURE — 84132 ASSAY OF SERUM POTASSIUM: CPT

## 2019-12-31 PROCEDURE — 6370000000 HC RX 637 (ALT 250 FOR IP): Performed by: NURSE PRACTITIONER

## 2019-12-31 PROCEDURE — 94761 N-INVAS EAR/PLS OXIMETRY MLT: CPT

## 2019-12-31 PROCEDURE — 94760 N-INVAS EAR/PLS OXIMETRY 1: CPT

## 2019-12-31 PROCEDURE — 87252 VIRUS INOCULATION TISSUE: CPT

## 2019-12-31 RX ORDER — POTASSIUM CHLORIDE 7.45 MG/ML
10 INJECTION INTRAVENOUS PRN
Status: DISCONTINUED | OUTPATIENT
Start: 2019-12-31 | End: 2020-01-04 | Stop reason: HOSPADM

## 2019-12-31 RX ORDER — CALCIUM CARBONATE 200(500)MG
500 TABLET,CHEWABLE ORAL 3 TIMES DAILY PRN
Status: DISCONTINUED | OUTPATIENT
Start: 2019-12-31 | End: 2020-01-04 | Stop reason: HOSPADM

## 2019-12-31 RX ORDER — SODIUM CHLORIDE, SODIUM LACTATE, POTASSIUM CHLORIDE, CALCIUM CHLORIDE 600; 310; 30; 20 MG/100ML; MG/100ML; MG/100ML; MG/100ML
INJECTION, SOLUTION INTRAVENOUS CONTINUOUS
Status: DISCONTINUED | OUTPATIENT
Start: 2019-12-31 | End: 2020-01-01

## 2019-12-31 RX ORDER — POTASSIUM CHLORIDE 20 MEQ/1
40 TABLET, EXTENDED RELEASE ORAL PRN
Status: DISCONTINUED | OUTPATIENT
Start: 2019-12-31 | End: 2020-01-04 | Stop reason: HOSPADM

## 2019-12-31 RX ADMIN — SODIUM CHLORIDE, PRESERVATIVE FREE 10 ML: 5 INJECTION INTRAVENOUS at 21:52

## 2019-12-31 RX ADMIN — PIPERACILLIN AND TAZOBACTAM 3.38 G: 3; .375 INJECTION, POWDER, LYOPHILIZED, FOR SOLUTION INTRAVENOUS at 07:58

## 2019-12-31 RX ADMIN — ENOXAPARIN SODIUM 40 MG: 40 INJECTION SUBCUTANEOUS at 07:57

## 2019-12-31 RX ADMIN — CYANOCOBALAMIN TAB 1000 MCG 1000 MCG: 1000 TAB at 07:58

## 2019-12-31 RX ADMIN — SODIUM CHLORIDE, PRESERVATIVE FREE 10 ML: 5 INJECTION INTRAVENOUS at 07:58

## 2019-12-31 RX ADMIN — PIPERACILLIN AND TAZOBACTAM 3.38 G: 3; .375 INJECTION, POWDER, LYOPHILIZED, FOR SOLUTION INTRAVENOUS at 23:51

## 2019-12-31 RX ADMIN — PIPERACILLIN AND TAZOBACTAM 3.38 G: 3; .375 INJECTION, POWDER, LYOPHILIZED, FOR SOLUTION INTRAVENOUS at 17:26

## 2019-12-31 RX ADMIN — IPRATROPIUM BROMIDE AND ALBUTEROL SULFATE 1 AMPULE: .5; 3 SOLUTION RESPIRATORY (INHALATION) at 16:43

## 2019-12-31 RX ADMIN — ANTACID TABLETS 500 MG: 500 TABLET, CHEWABLE ORAL at 23:50

## 2019-12-31 RX ADMIN — CALCIUM 500 MG: 500 TABLET ORAL at 07:58

## 2019-12-31 RX ADMIN — AZITHROMYCIN MONOHYDRATE 500 MG: 500 INJECTION, POWDER, LYOPHILIZED, FOR SOLUTION INTRAVENOUS at 22:00

## 2019-12-31 RX ADMIN — IPRATROPIUM BROMIDE AND ALBUTEROL SULFATE 1 AMPULE: .5; 3 SOLUTION RESPIRATORY (INHALATION) at 12:42

## 2019-12-31 RX ADMIN — POTASSIUM CHLORIDE 10 MEQ: 750 TABLET, FILM COATED, EXTENDED RELEASE ORAL at 07:58

## 2019-12-31 RX ADMIN — SIMVASTATIN 20 MG: 20 TABLET, FILM COATED ORAL at 21:52

## 2019-12-31 RX ADMIN — CALCIUM 500 MG: 500 TABLET ORAL at 21:52

## 2019-12-31 RX ADMIN — OSELTAMIVIR PHOSPHATE 75 MG: 75 CAPSULE ORAL at 07:57

## 2019-12-31 RX ADMIN — PIPERACILLIN AND TAZOBACTAM 3.38 G: 3; .375 INJECTION, POWDER, LYOPHILIZED, FOR SOLUTION INTRAVENOUS at 01:14

## 2019-12-31 RX ADMIN — IPRATROPIUM BROMIDE AND ALBUTEROL SULFATE 1 AMPULE: .5; 3 SOLUTION RESPIRATORY (INHALATION) at 08:38

## 2019-12-31 RX ADMIN — LOSARTAN POTASSIUM AND HYDROCHLOROTHIAZIDE 1 TABLET: 50; 12.5 TABLET, FILM COATED ORAL at 07:58

## 2019-12-31 RX ADMIN — POTASSIUM CHLORIDE 40 MEQ: 1500 TABLET, EXTENDED RELEASE ORAL at 07:06

## 2019-12-31 RX ADMIN — IPRATROPIUM BROMIDE AND ALBUTEROL SULFATE 1 AMPULE: .5; 3 SOLUTION RESPIRATORY (INHALATION) at 20:02

## 2019-12-31 RX ADMIN — AMLODIPINE BESYLATE 5 MG: 5 TABLET ORAL at 07:58

## 2019-12-31 RX ADMIN — ASPIRIN 81 MG: 81 TABLET, COATED ORAL at 07:57

## 2019-12-31 RX ADMIN — SODIUM CHLORIDE, POTASSIUM CHLORIDE, SODIUM LACTATE AND CALCIUM CHLORIDE: 600; 310; 30; 20 INJECTION, SOLUTION INTRAVENOUS at 18:45

## 2019-12-31 RX ADMIN — OSELTAMIVIR PHOSPHATE 75 MG: 75 CAPSULE ORAL at 21:52

## 2019-12-31 ASSESSMENT — PAIN SCALES - GENERAL
PAINLEVEL_OUTOF10: 0

## 2019-12-31 NOTE — PROGRESS NOTES
Pharmacy Medication Reconciliation Note     List of medications patient is currently taking is complete. Source of information:   1. Patient  2. EMR    Notes regarding home medications:   1. Pt states he received some of his morning home medication doses this morning before presenting to the ER, can't remember which ones he took and which ones he is missing.       [id number] [facility] Suzie, Pharmacy Intern  12/30/2019 7:51 PM

## 2019-12-31 NOTE — PLAN OF CARE
Problem: Falls - Risk of:  Goal: Will remain free from falls  Description  Will remain free from falls  Outcome: Ongoing  Goal: Absence of physical injury  Description  Absence of physical injury  Outcome: Ongoing     Problem: Risk for Impaired Skin Integrity  Goal: Tissue integrity - skin and mucous membranes  Description  Structural intactness and normal physiological function of skin and  mucous membranes.   Outcome: Ongoing     Problem: SAFETY  Goal: Free from accidental physical injury  Outcome: Ongoing     Problem: DISCHARGE BARRIERS  Goal: Patient's continuum of care needs are met  Outcome: Ongoing     Problem: Urinary Elimination:  Goal: Signs and symptoms of infection will decrease  Description  Signs and symptoms of infection will decrease  Outcome: Ongoing  Goal: Ability to reestablish a normal urinary elimination pattern will improve - after catheter removal  Description  Ability to reestablish a normal urinary elimination pattern will improve  Outcome: Ongoing  Goal: Complications related to the disease process, condition or treatment will be avoided or minimized  Description  Complications related to the disease process, condition or treatment will be avoided or minimized  Outcome: Ongoing

## 2019-12-31 NOTE — PROGRESS NOTES
Physical Therapy    Facility/Department: [address] PROGRESSIVE CARE  Initial Assessment/Discharge Note    NAME: Katie Quarles  : 1935  MRN: [id number]    Date of Service: 2019    Discharge Recommendations:  24 hour supervision or assist   PT Equipment Recommendations  Equipment Needed: No  Katie Quarles scored a 8/24 on the AM-PAC short mobility form. At this time, no further PT is recommended upon discharge. Recommend patient returns to prior setting with prior services. Assessment   Assessment: pt is paralegic and has 24/7 caregivers; he uses a mechanical lift and power chair; pt appears to be at baseline and has no further skilled therapy needs  Prognosis: Good  Decision Making: Low Complexity  PT Education: PT Role  Barriers to Learning: none  No Skilled PT: Safe to return home  REQUIRES PT FOLLOW UP: No  Activity Tolerance  Activity Tolerance: Patient Tolerated treatment well       Patient Diagnosis(es): The primary encounter diagnosis was Community acquired pneumonia of left lower lobe of lung ([facility] [address] A diagnosis of Septicemia ([facility] [address] was also pertinent to this visit. has a past medical history of Arthritis, CAD (coronary artery disease), History of blood transfusion, Hyperlipidemia, Hypertension, and Prostate cancer ([facility] [address] has a past surgical history that includes Cardiac surgery and back surgery.     Restrictions  Restrictions/Precautions  Restrictions/Precautions: Fall Risk  Position Activity Restriction  Other position/activity restrictions: paraplegic; no feeling from distal to T4  Vision/Hearing  Vision: Impaired  Vision Exceptions: Wears glasses at all times  Hearing: Within functional limits     Subjective  General  Chart Reviewed: Yes  Patient assessed for rehabilitation services?: Yes  Additional Pertinent Hx: per H&P note:  82 y.o. male who presented to [facility] with 1 day history of worsening cough associated with mild shortness of breath fever no obvious alleviating Unable to assess  Sit to Supine: Unable to assess  Scooting: Unable to assess  Transfers  Comment: pt paraplegic         Balance  Sitting - Static: Poor        Plan   Plan  Plan Comment: pt at baseline; no further therapy needed  Safety Devices  Type of devices: Call light within reach, Left in chair, Nurse notified      AM-PAC Score  AM-PAC Inpatient Mobility Raw Score : 8 (12/31/19 1136)  AM-PAC Inpatient T-Scale Score : 28.52 (12/31/19 1136)  Mobility Inpatient CMS 0-100% Score: 86.62 (12/31/19 1136)  Mobility Inpatient CMS G-Code Modifier : CM (12/31/19 1136)          Therapy Time   Individual Concurrent Group Co-treatment   Time In 1045         Time Out 1132         Minutes 52              Nya Nava, PT, [id number]     NYA NAVA, PT

## 2019-12-31 NOTE — ED NOTES
[facility] transported to Room [address] [place] [address] stretcher by Transport. IV site clean, dry, and intact. MEWS score and pain assessed and documented. Updated wife and son on plan of care.        Carrie Gong RN  12/30/19 2100

## 2019-12-31 NOTE — PROGRESS NOTES
4 Eyes Skin Assessment     The patient is being assess for  Admission    I agree that 2 RN's have performed a thorough Head to Toe Skin Assessment on the patient. ALL assessment sites listed below have been assessed. Areas assessed by both nurses: Zenaida/Travon  [x]   Head, Face, and Ears   [x]   Shoulders, Back, and Chest  [x]   Arms, Elbows, and Hands   [x]   Coccyx, Sacrum, and IschIum  [x]   Legs, Feet, and Heels        Does the Patient have Skin Breakdown?   No         Isaias Prevention initiated:  Yes   Wound Care Orders initiated:  No      [facility] nurse consulted for Pressure Injury (Stage 3,4, Unstageable, DTI, NWPT, and Complex wounds), New and Established Ostomies:  Yes      Nurse 1 eSignature: Electronically signed by Juanita Ridley RN on 12/31/19 at 2:06 AM    **SHARE this note so that the co-signing nurse is able to place an eSignature**    Nurse 2 eSignature: Electronically signed by Jenna Uriarte RN on 12/31/19 at 2:25 AM

## 2019-12-31 NOTE — PROGRESS NOTES
characterized by decreased laryngeal elevation without observed overt sign/symptoms of penetration/aspiration, but report of occasional cough with liquids is noted. · CT Chest with concern for potential aspiration. · Recommend MBS for continued assessment. Discussed with MD who is in agreement with MBS. Dysphagia Outcome Severity Scale: Level 5: Mild dysphagia- Distant supervision. May need one diet consistency restricted     Treatment Plan  Requires SLP Intervention: Yes  Duration/Frequency of Treatment: PENDING MBS  D/C Recommendations: To be determined    Recommended Diet and Intervention  Diet Solids Recommendation: Regular(PENDING MBS)  Liquid Consistency Recommendation: Thin(PENDING MBS)  Recommended Form of Meds: PO(PENDING MBS)   Compensatory Swallowing Strategies: Remain upright for 30-45 minutes after meals;Upright as possible for all oral intake(PENDING MBS)  Recommendations: Modified barium swallow study  Therapeutic Interventions: (PENDING MBS)    Treatment/Goals  Dysphagia Goals: The patient will tolerate instrumental swallowing procedure    General  Chart Reviewed: Yes  Subjective: Accepted and tolerated evaluation at bedside  Behavior/Cognition: Alert; Cooperative;Pleasant mood  Communication Observation: Functional  Follows Directions: Complex  Dentition: Adequate  Patient Positioning: Upright in chair  Baseline Vocal Quality: Normal  Volitional Cough: Strong  Consistencies Administered: Reg solid; Dysphagia Pureed (Dysphagia I); Honey - cup;Nectar - cup; Thin - cup; Thin - straw    Vision/Hearing  Vision Exceptions: Wears glasses at all times  Hearing: Within functional limits    Oral Motor Deficits  Oral Motor:  Within functional limits    Oral Phase Dysfunction  Oral Phase: WFL     Indicators of Pharyngeal Phase Dysfunction  Decreased Laryngeal Elevation: All    Prognosis  Prognosis for safe diet advancement: guarded  Consulted and agree with results and recommendations: Patient;RN;Family member;MD  Family member consulted: wife and caregiver    Education  Patient Education: Completed on results/recs/plan  Patient Education Response: Needs reinforcement;Verbalizes understanding      Therapy Time  SLP Individual Minutes  Time In: 6088  Time Out: 6484  Minutes: [address]  [address], [id number]  Speech-Language Pathologist  12/31/2019 11:41 AM

## 2019-12-31 NOTE — PROCEDURES
times  Hearing: Within functional limits    Patient Position: Lateral   Patient Degrees: Fully upright in VSE chair  Consistencies Administered: Dysphagia Soft and Bite-Sized (Dysphagia III); Reg solid; Dysphagia Minced and Moist (Dysphagia II); Dysphagia Pureed (Dysphagia I); Honey cup; Thin straw; Thin cup;Nectar cup    Impressions:  Treatment Dx and ICD 10: pharyngeal dysphagia; r 13.13    Mild oral stage dysphagia characterized by decreased mastication and decreased lingual manipulation. · Prolonged but adequate bolus formation and movement with all. · Reduced bolus control with premature bolus loss to the pharynx with all. Mild to moderate pharyngeal stage dysphagia characterized by delayed swallow, decreased laryngeal elevation, and decreased pharyngeal peristalsis. · Premature spillage to the valleculae with all. · Vallecular and pyriform residue with solids is cleared spontaneously with repeat swallow. Dysphagia Outcome Severity Scale: Level 6: Within functional limits/Modified independence  Penetration-Aspiration Scale (PAS): 1 - Material does not enter the airway    Recommended Diet:  Solid consistency: Regular  Liquid consistency:  Thin  Liquid administration via: Cup;Straw  Medication administration: PO  Compensatory Swallowing Strategies: Remain upright for 30-45 minutes after meals;Upright as possible for all oral intake;Swallow 2 times per bite/sip;Small bites/sips    Recommendations/Treatment  Requires SLP Intervention: Yes   Duration/Frequency of Treatment: ST to follow-up 1-3 times for dysphagia during acute admission  Therapeutic Interventions: Patient/Family education;Diet tolerance monitoring  D/C Recommendations: (suspect no need for skilled dysphagia tx upon discharge)    Education:   Patient Education: Completed on results/recs/plan  Patient Education Response: Needs reinforcement;Verbalizes understanding    Prognosis  Prognosis for safe diet advancement: good    Goals: Dysphagia Goals: The patient will tolerate recommended diet without observed clinical signs of aspiration; The patient/caregiver will demonstrate understanding of compensatory strategies for improved swallowing safety. Oral Preparation / Oral Phase  Oral Phase - Major Contributing Deficits  Poor Mastication: Mechanical soft solid; Soft solid;Reg solid(prolonged but adequate)  Weak Lingual Manipulation: All  Reduced Posterior Propulsion: All(prolonged but adequate)  Reduced Bolus Control: All  Premature Bolus Loss to Pharynx: All    Pharyngeal Phase  Pharyngeal Phase - Major Contributing Deficits  Delayed Swallow Initiation: All  Premature Spillage to Valleculae: All  Reduced Pharyngeal Peristalsis: All  Reduced Laryngeal Elevation: All  Pharyngeal Residue - Valleculae: Reg solid; Soft solid;Mechanical soft solid;Puree(clears spontaneously with repeat swallow )  Pharyngeal Residue - Pyriform: Reg solid; Soft solid;Mechanical soft solid;Puree(clears spontaneously with repeat swallow )    Upper Esophageal Phase  Esophageal Screen: (JULI)      Therapy Time:   Individual   Time In 1415   Time Out 1500   Minutes 45       [address], [id number]  Speech-Language Pathologist  12/31/2019, 3:00 PM

## 2019-12-31 NOTE — PROGRESS NOTES
Occupational Therapy   Occupational Therapy Initial Assessment and Discharge    Date: 2019   Patient Name: Audie Phelps  MRN: [id number]     : 1935    Date of Service: 2019    Discharge Recommendations: Audie Phelps scored a 13/24 on the AM-PAC ADL Inpatient form. At this time, no further OT is recommended upon discharge due to pt functioning at baseline. Recommend patient returns to prior setting with prior services. 24 hour supervision or assist  OT Equipment Recommendations  Equipment Needed: No  Other: pt with needed AD    Assessment   Assessment: Audie Phelps is a 82 y.o. male who presents to the emergency department with fever. Patient endorses chills and productive cough x 1 week. Home health visited and found temp to be 101 and patinet a little bit more confused than usual. No SOB or chest pain. Per Home Health and family patient with some confusion as well. No other associated symptoms. PTA pt from home with wife and 24/7 aides. Pt reports being dependent for all transfers with use of lift and significant assist for ADLs at baseline. Pt currently functioning at/close to baseline completing transfers with total assist and use of maxi move and anticipate pt needing up to Max/Total A for LB ADLs. Pt with no ongoing OT needs at this time. Pt with good home setup and adequate assist from wife and aides. Anticipate pt safe to return home at time of D/C. Prognosis: Good  Decision Making: Medium Complexity  Exam: see above  Assistance / Modification: maxi move  OT Education: OT Role;Plan of Care  REQUIRES OT FOLLOW UP: No  Activity Tolerance  Activity Tolerance: Patient Tolerated treatment well  Safety Devices  Safety Devices in place: Yes  Type of devices: Nurse notified; Left in chair;Gait belt; Chair alarm in place;Call light within reach           Patient Diagnosis(es): The primary encounter diagnosis was Community acquired pneumonia of left lower lobe of lung ([facility] [address] A diagnosis of Septicemia ([facility] [address] was also pertinent to this visit. has a past medical history of Arthritis, CAD (coronary artery disease), History of blood transfusion, Hyperlipidemia, Hypertension, and Prostate cancer ([facility] [address] has a past surgical history that includes Cardiac surgery and back surgery. Restrictions  Restrictions/Precautions  Restrictions/Precautions: Fall Risk  Position Activity Restriction  Other position/activity restrictions: paraplegic; no feeling from distal to T4    Subjective   General  Chart Reviewed: Yes  Patient assessed for rehabilitation services?: Yes  Additional Pertinent Hx: per ED note, Shiv Bearden is a 82 y.o. male who presents to the emergency department with fever. Patient endorses chills and productive cough x 1 week. Home health visited and found temp to be 101 and patinet a little bit more confused than usual. No SOB or chest pain. Per Home Health and family patient with some confusion as well. No other associated symptoms. Family / Caregiver Present: Yes(wife and aide)  Referring Practitioner: Denise Zambrano MD  Subjective  Subjective: Pt supine in bed upon arrival and agreeable to OT evaluation. Pt reports no pain throughout session. General Comment  Comments: okay for therapy per RN.       Social/Functional History  Social/Functional History  Lives With: Spouse  Type of Home: House  Home Layout: One level  Home Access: Level entry, Ramped entrance  Bathroom Shower/Tub: Walk-in shower  Bathroom Toilet: Handicap height  Bathroom Equipment: Shower chair, Grab bars around toilet  Bathroom Accessibility: Accessible(has a mechanical lift in bathroom to get into shower)  Home Equipment: [place] Help From: Home health(24/7 HHA)  ADL Assistance: Needs assistance(HHA assists with dressing/bathing)  Ambulation Assistance: (power chair)  Transfer Assistance: Needs assistance(mechanical lift )  Active : Yes(ramp into [place] with hand

## 2020-01-01 ENCOUNTER — APPOINTMENT (OUTPATIENT)
Dept: GENERAL RADIOLOGY | Age: 85
DRG: 871 | End: 2020-01-01
Payer: MEDICARE

## 2020-01-01 LAB
ANION GAP SERPL CALCULATED.3IONS-SCNC: 14 MMOL/L (ref 3–16)
BASOPHILS ABSOLUTE: 0 K/UL (ref 0–0.2)
BASOPHILS RELATIVE PERCENT: 0.4 %
BUN BLDV-MCNC: 14 MG/DL (ref 7–20)
CALCIUM SERPL-MCNC: 8.2 MG/DL (ref 8.3–10.6)
CHLORIDE BLD-SCNC: 103 MMOL/L (ref 99–110)
CO2: 24 MMOL/L (ref 21–32)
CREAT SERPL-MCNC: <0.5 MG/DL (ref 0.8–1.3)
EOSINOPHILS ABSOLUTE: 0 K/UL (ref 0–0.6)
EOSINOPHILS RELATIVE PERCENT: 0.2 %
GFR AFRICAN AMERICAN: >60
GFR NON-AFRICAN AMERICAN: >60
GLUCOSE BLD-MCNC: 124 MG/DL (ref 70–99)
HCT VFR BLD CALC: 35.2 % (ref 40.5–52.5)
HEMOGLOBIN: 11.7 G/DL (ref 13.5–17.5)
LACTIC ACID: 1 MMOL/L (ref 0.4–2)
LYMPHOCYTES ABSOLUTE: 1 K/UL (ref 1–5.1)
LYMPHOCYTES RELATIVE PERCENT: 8.7 %
MAGNESIUM: 2 MG/DL (ref 1.8–2.4)
MCH RBC QN AUTO: 32.6 PG (ref 26–34)
MCHC RBC AUTO-ENTMCNC: 33.1 G/DL (ref 31–36)
MCV RBC AUTO: 98.3 FL (ref 80–100)
MONOCYTES ABSOLUTE: 0.8 K/UL (ref 0–1.3)
MONOCYTES RELATIVE PERCENT: 7.2 %
NEUTROPHILS ABSOLUTE: 9.5 K/UL (ref 1.7–7.7)
NEUTROPHILS RELATIVE PERCENT: 83.5 %
PDW BLD-RTO: 14.9 % (ref 12.4–15.4)
PLATELET # BLD: 163 K/UL (ref 135–450)
PLATELET SLIDE REVIEW: ADEQUATE
PMV BLD AUTO: 8.3 FL (ref 5–10.5)
POTASSIUM REFLEX MAGNESIUM: 3.4 MMOL/L (ref 3.5–5.1)
RBC # BLD: 3.58 M/UL (ref 4.2–5.9)
RBC # BLD: NORMAL 10*6/UL
REPORT: NORMAL
SLIDE REVIEW: ABNORMAL
SODIUM BLD-SCNC: 141 MMOL/L (ref 136–145)
URINE CULTURE, ROUTINE: NORMAL
WBC # BLD: 11.3 K/UL (ref 4–11)

## 2020-01-01 PROCEDURE — 83605 ASSAY OF LACTIC ACID: CPT

## 2020-01-01 PROCEDURE — 74018 RADEX ABDOMEN 1 VIEW: CPT

## 2020-01-01 PROCEDURE — 6360000002 HC RX W HCPCS: Performed by: INTERNAL MEDICINE

## 2020-01-01 PROCEDURE — 2060000000 HC ICU INTERMEDIATE R&B

## 2020-01-01 PROCEDURE — 85025 COMPLETE CBC W/AUTO DIFF WBC: CPT

## 2020-01-01 PROCEDURE — 83735 ASSAY OF MAGNESIUM: CPT

## 2020-01-01 PROCEDURE — 94760 N-INVAS EAR/PLS OXIMETRY 1: CPT

## 2020-01-01 PROCEDURE — 6370000000 HC RX 637 (ALT 250 FOR IP): Performed by: HOSPITALIST

## 2020-01-01 PROCEDURE — 6370000000 HC RX 637 (ALT 250 FOR IP): Performed by: INTERNAL MEDICINE

## 2020-01-01 PROCEDURE — 94640 AIRWAY INHALATION TREATMENT: CPT

## 2020-01-01 PROCEDURE — 6370000000 HC RX 637 (ALT 250 FOR IP): Performed by: NURSE PRACTITIONER

## 2020-01-01 PROCEDURE — 2580000003 HC RX 258: Performed by: INTERNAL MEDICINE

## 2020-01-01 PROCEDURE — 2580000003 HC RX 258: Performed by: HOSPITALIST

## 2020-01-01 PROCEDURE — 80048 BASIC METABOLIC PNL TOTAL CA: CPT

## 2020-01-01 PROCEDURE — 36415 COLL VENOUS BLD VENIPUNCTURE: CPT

## 2020-01-01 PROCEDURE — 6360000002 HC RX W HCPCS: Performed by: HOSPITALIST

## 2020-01-01 RX ORDER — IPRATROPIUM BROMIDE AND ALBUTEROL SULFATE 2.5; .5 MG/3ML; MG/3ML
1 SOLUTION RESPIRATORY (INHALATION) EVERY 4 HOURS PRN
Status: DISCONTINUED | OUTPATIENT
Start: 2020-01-01 | End: 2020-01-04 | Stop reason: HOSPADM

## 2020-01-01 RX ORDER — HYDROCHLOROTHIAZIDE 25 MG/1
25 TABLET ORAL DAILY
Status: DISCONTINUED | OUTPATIENT
Start: 2020-01-01 | End: 2020-01-04 | Stop reason: HOSPADM

## 2020-01-01 RX ORDER — SODIUM CHLORIDE, SODIUM LACTATE, POTASSIUM CHLORIDE, CALCIUM CHLORIDE 600; 310; 30; 20 MG/100ML; MG/100ML; MG/100ML; MG/100ML
INJECTION, SOLUTION INTRAVENOUS CONTINUOUS
Status: DISCONTINUED | OUTPATIENT
Start: 2020-01-01 | End: 2020-01-03

## 2020-01-01 RX ORDER — METOCLOPRAMIDE HYDROCHLORIDE 5 MG/ML
5 INJECTION INTRAMUSCULAR; INTRAVENOUS EVERY 8 HOURS
Status: DISCONTINUED | OUTPATIENT
Start: 2020-01-01 | End: 2020-01-04 | Stop reason: HOSPADM

## 2020-01-01 RX ORDER — PANTOPRAZOLE SODIUM 40 MG/1
40 TABLET, DELAYED RELEASE ORAL
Status: DISCONTINUED | OUTPATIENT
Start: 2020-01-01 | End: 2020-01-04 | Stop reason: HOSPADM

## 2020-01-01 RX ADMIN — ANTACID TABLETS 500 MG: 500 TABLET, CHEWABLE ORAL at 03:32

## 2020-01-01 RX ADMIN — PIPERACILLIN AND TAZOBACTAM 3.38 G: 3; .375 INJECTION, POWDER, LYOPHILIZED, FOR SOLUTION INTRAVENOUS at 17:32

## 2020-01-01 RX ADMIN — SODIUM CHLORIDE, PRESERVATIVE FREE 10 ML: 5 INJECTION INTRAVENOUS at 10:42

## 2020-01-01 RX ADMIN — SODIUM CHLORIDE, POTASSIUM CHLORIDE, SODIUM LACTATE AND CALCIUM CHLORIDE: 600; 310; 30; 20 INJECTION, SOLUTION INTRAVENOUS at 14:32

## 2020-01-01 RX ADMIN — IPRATROPIUM BROMIDE AND ALBUTEROL SULFATE 1 AMPULE: .5; 3 SOLUTION RESPIRATORY (INHALATION) at 08:56

## 2020-01-01 RX ADMIN — OSELTAMIVIR PHOSPHATE 75 MG: 75 CAPSULE ORAL at 20:21

## 2020-01-01 RX ADMIN — CALCIUM 500 MG: 500 TABLET ORAL at 20:22

## 2020-01-01 RX ADMIN — CYANOCOBALAMIN TAB 1000 MCG 1000 MCG: 1000 TAB at 10:29

## 2020-01-01 RX ADMIN — SODIUM CHLORIDE, POTASSIUM CHLORIDE, SODIUM LACTATE AND CALCIUM CHLORIDE 125 ML/HR: 600; 310; 30; 20 INJECTION, SOLUTION INTRAVENOUS at 02:53

## 2020-01-01 RX ADMIN — METOCLOPRAMIDE 5 MG: 5 INJECTION, SOLUTION INTRAMUSCULAR; INTRAVENOUS at 17:28

## 2020-01-01 RX ADMIN — METOCLOPRAMIDE 5 MG: 5 INJECTION, SOLUTION INTRAMUSCULAR; INTRAVENOUS at 22:16

## 2020-01-01 RX ADMIN — ANTACID TABLETS 500 MG: 500 TABLET, CHEWABLE ORAL at 18:49

## 2020-01-01 RX ADMIN — AZITHROMYCIN MONOHYDRATE 500 MG: 500 INJECTION, POWDER, LYOPHILIZED, FOR SOLUTION INTRAVENOUS at 17:33

## 2020-01-01 RX ADMIN — ASPIRIN 81 MG: 81 TABLET, COATED ORAL at 10:29

## 2020-01-01 RX ADMIN — PANTOPRAZOLE SODIUM 40 MG: 40 TABLET, DELAYED RELEASE ORAL at 06:59

## 2020-01-01 RX ADMIN — PIPERACILLIN AND TAZOBACTAM 3.38 G: 3; .375 INJECTION, POWDER, LYOPHILIZED, FOR SOLUTION INTRAVENOUS at 10:18

## 2020-01-01 RX ADMIN — POTASSIUM BICARBONATE 40 MEQ: 782 TABLET, EFFERVESCENT ORAL at 10:25

## 2020-01-01 RX ADMIN — ONDANSETRON 4 MG: 2 INJECTION INTRAMUSCULAR; INTRAVENOUS at 10:34

## 2020-01-01 RX ADMIN — OSELTAMIVIR PHOSPHATE 75 MG: 75 CAPSULE ORAL at 10:29

## 2020-01-01 RX ADMIN — ONDANSETRON 4 MG: 2 INJECTION INTRAMUSCULAR; INTRAVENOUS at 18:49

## 2020-01-01 RX ADMIN — CALCIUM 500 MG: 500 TABLET ORAL at 10:29

## 2020-01-01 RX ADMIN — HYDROCHLOROTHIAZIDE 25 MG: 25 TABLET ORAL at 10:29

## 2020-01-01 RX ADMIN — ENOXAPARIN SODIUM 40 MG: 40 INJECTION SUBCUTANEOUS at 10:23

## 2020-01-01 RX ADMIN — IPRATROPIUM BROMIDE AND ALBUTEROL SULFATE 1 AMPULE: .5; 3 SOLUTION RESPIRATORY (INHALATION) at 16:58

## 2020-01-01 RX ADMIN — SIMVASTATIN 20 MG: 20 TABLET, FILM COATED ORAL at 20:21

## 2020-01-01 ASSESSMENT — PAIN SCALES - GENERAL
PAINLEVEL_OUTOF10: 0

## 2020-01-01 NOTE — PROGRESS NOTES
Hospitalist Progress Note    CC:     Fever      Admit date: 12/30/2019  Days in hospital:  2    Subjective:     He was feeling nauseous this morning. Had a couple bowel movements every day    ROS:   Review of Systems   Constitutional: Negative for chills and fever. Respiratory: Negative for cough and shortness of breath. Cardiovascular: Negative for chest pain and palpitations. Neurological: Negative for headaches. Objective:    BP (!) 170/89   Pulse 85   Temp 98.2 °F (36.8 °C) (Oral)   Resp 16   Ht 5' 10\" (1.778 m)   Wt 231 lb 7.7 oz (105 kg)   SpO2 94%   BMI 33.21 kg/m²     Gen: alert, NAD  HEENT: NC/AT, moist mucous membranes, no oropharyngeal erythema or exudate  Neck: supple, trachea midline, no anterior cervical or SC LAD  Heart: Normal s1/s2, RRR, no murmurs, gallops, or rubs. Lungs: Bibasilar crackles, no wheezing, no use of accessory muscles  Abd: bowel sounds present, soft, distended, none tender  Extrem: No clubbing, cyanosis, bilateral legs edema  Psych: A & O x3 , affect appropriate  Neuro: grossly intact, moves all four extremities spontaneously.       Medications:  Scheduled Meds:   pantoprazole  40 mg Oral QAM AC    hydrochlorothiazide  25 mg Oral Daily    metoclopramide  5 mg Intravenous Q8H    aspirin  81 mg Oral Daily    calcium elemental  500 mg Oral BID    cyanocobalamin  1,000 mcg Oral Daily    simvastatin  20 mg Oral Nightly    polyethylene glycol  17 g Oral Daily    potassium chloride  10 mEq Oral Daily    oseltamivir  75 mg Oral BID    sodium chloride flush  10 mL Intravenous 2 times per day    enoxaparin  40 mg Subcutaneous Daily    ipratropium-albuterol  1 ampule Inhalation Q4H WA    piperacillin-tazobactam  3.375 g Intravenous Q8H    azithromycin  500 mg Intravenous Q24H       PRN Meds:  benzocaine, potassium chloride **OR** potassium alternative oral replacement **OR** potassium chloride, calcium carbonate, albuterol sulfate HFA, sodium

## 2020-01-02 ENCOUNTER — APPOINTMENT (OUTPATIENT)
Dept: GENERAL RADIOLOGY | Age: 85
DRG: 871 | End: 2020-01-02
Payer: MEDICARE

## 2020-01-02 ENCOUNTER — APPOINTMENT (OUTPATIENT)
Dept: ULTRASOUND IMAGING | Age: 85
DRG: 871 | End: 2020-01-02
Payer: MEDICARE

## 2020-01-02 LAB
ANION GAP SERPL CALCULATED.3IONS-SCNC: 14 MMOL/L (ref 3–16)
BASOPHILS ABSOLUTE: 0 K/UL (ref 0–0.2)
BASOPHILS RELATIVE PERCENT: 0.3 %
BUN BLDV-MCNC: 11 MG/DL (ref 7–20)
CALCIUM SERPL-MCNC: 8.3 MG/DL (ref 8.3–10.6)
CHLORIDE BLD-SCNC: 101 MMOL/L (ref 99–110)
CO2: 24 MMOL/L (ref 21–32)
CREAT SERPL-MCNC: <0.5 MG/DL (ref 0.8–1.3)
CULTURE, RESPIRATORY: NORMAL
EOSINOPHILS ABSOLUTE: 0 K/UL (ref 0–0.6)
EOSINOPHILS RELATIVE PERCENT: 0 %
GFR AFRICAN AMERICAN: >60
GFR NON-AFRICAN AMERICAN: >60
GLUCOSE BLD-MCNC: 123 MG/DL (ref 70–99)
GRAM STAIN RESULT: NORMAL
HCT VFR BLD CALC: 36.1 % (ref 40.5–52.5)
HEMOGLOBIN: 12.1 G/DL (ref 13.5–17.5)
LYMPHOCYTES ABSOLUTE: 0.8 K/UL (ref 1–5.1)
LYMPHOCYTES RELATIVE PERCENT: 8.6 %
MAGNESIUM: 2.1 MG/DL (ref 1.8–2.4)
MCH RBC QN AUTO: 33 PG (ref 26–34)
MCHC RBC AUTO-ENTMCNC: 33.6 G/DL (ref 31–36)
MCV RBC AUTO: 98.2 FL (ref 80–100)
MONOCYTES ABSOLUTE: 0.4 K/UL (ref 0–1.3)
MONOCYTES RELATIVE PERCENT: 5 %
NEUTROPHILS ABSOLUTE: 7.5 K/UL (ref 1.7–7.7)
NEUTROPHILS RELATIVE PERCENT: 86.1 %
PDW BLD-RTO: 14.6 % (ref 12.4–15.4)
PLATELET # BLD: 189 K/UL (ref 135–450)
PMV BLD AUTO: 8.2 FL (ref 5–10.5)
POTASSIUM REFLEX MAGNESIUM: 3.4 MMOL/L (ref 3.5–5.1)
RBC # BLD: 3.67 M/UL (ref 4.2–5.9)
SODIUM BLD-SCNC: 139 MMOL/L (ref 136–145)
WBC # BLD: 8.7 K/UL (ref 4–11)

## 2020-01-02 PROCEDURE — 36415 COLL VENOUS BLD VENIPUNCTURE: CPT

## 2020-01-02 PROCEDURE — 80048 BASIC METABOLIC PNL TOTAL CA: CPT

## 2020-01-02 PROCEDURE — 6360000002 HC RX W HCPCS: Performed by: HOSPITALIST

## 2020-01-02 PROCEDURE — 2580000003 HC RX 258: Performed by: INTERNAL MEDICINE

## 2020-01-02 PROCEDURE — 6360000002 HC RX W HCPCS: Performed by: INTERNAL MEDICINE

## 2020-01-02 PROCEDURE — 74018 RADEX ABDOMEN 1 VIEW: CPT

## 2020-01-02 PROCEDURE — 76705 ECHO EXAM OF ABDOMEN: CPT

## 2020-01-02 PROCEDURE — 6370000000 HC RX 637 (ALT 250 FOR IP): Performed by: HOSPITALIST

## 2020-01-02 PROCEDURE — 83735 ASSAY OF MAGNESIUM: CPT

## 2020-01-02 PROCEDURE — 6370000000 HC RX 637 (ALT 250 FOR IP): Performed by: INTERNAL MEDICINE

## 2020-01-02 PROCEDURE — 2060000000 HC ICU INTERMEDIATE R&B

## 2020-01-02 PROCEDURE — 85025 COMPLETE CBC W/AUTO DIFF WBC: CPT

## 2020-01-02 RX ORDER — LOSARTAN POTASSIUM 100 MG/1
100 TABLET ORAL DAILY
Status: DISCONTINUED | OUTPATIENT
Start: 2020-01-02 | End: 2020-01-04 | Stop reason: HOSPADM

## 2020-01-02 RX ADMIN — ASPIRIN 81 MG: 81 TABLET, COATED ORAL at 09:25

## 2020-01-02 RX ADMIN — PIPERACILLIN AND TAZOBACTAM 3.38 G: 3; .375 INJECTION, POWDER, LYOPHILIZED, FOR SOLUTION INTRAVENOUS at 09:20

## 2020-01-02 RX ADMIN — METOCLOPRAMIDE 5 MG: 5 INJECTION, SOLUTION INTRAMUSCULAR; INTRAVENOUS at 21:55

## 2020-01-02 RX ADMIN — SODIUM CHLORIDE, PRESERVATIVE FREE 10 ML: 5 INJECTION INTRAVENOUS at 20:03

## 2020-01-02 RX ADMIN — CYANOCOBALAMIN TAB 1000 MCG 1000 MCG: 1000 TAB at 09:25

## 2020-01-02 RX ADMIN — PIPERACILLIN AND TAZOBACTAM 3.38 G: 3; .375 INJECTION, POWDER, LYOPHILIZED, FOR SOLUTION INTRAVENOUS at 01:23

## 2020-01-02 RX ADMIN — CALCIUM 500 MG: 500 TABLET ORAL at 20:03

## 2020-01-02 RX ADMIN — SIMVASTATIN 20 MG: 20 TABLET, FILM COATED ORAL at 20:03

## 2020-01-02 RX ADMIN — SODIUM CHLORIDE, PRESERVATIVE FREE 10 ML: 5 INJECTION INTRAVENOUS at 09:28

## 2020-01-02 RX ADMIN — PIPERACILLIN AND TAZOBACTAM 3.38 G: 3; .375 INJECTION, POWDER, LYOPHILIZED, FOR SOLUTION INTRAVENOUS at 18:26

## 2020-01-02 RX ADMIN — AZITHROMYCIN MONOHYDRATE 500 MG: 500 INJECTION, POWDER, LYOPHILIZED, FOR SOLUTION INTRAVENOUS at 18:52

## 2020-01-02 RX ADMIN — POLYETHYLENE GLYCOL 3350 17 G: 17 POWDER, FOR SOLUTION ORAL at 09:27

## 2020-01-02 RX ADMIN — POTASSIUM CHLORIDE 10 MEQ: 750 TABLET, FILM COATED, EXTENDED RELEASE ORAL at 09:25

## 2020-01-02 RX ADMIN — METOCLOPRAMIDE 5 MG: 5 INJECTION, SOLUTION INTRAMUSCULAR; INTRAVENOUS at 15:17

## 2020-01-02 RX ADMIN — HYDROCHLOROTHIAZIDE 25 MG: 25 TABLET ORAL at 09:24

## 2020-01-02 RX ADMIN — METOCLOPRAMIDE 5 MG: 5 INJECTION, SOLUTION INTRAMUSCULAR; INTRAVENOUS at 05:35

## 2020-01-02 RX ADMIN — LOSARTAN POTASSIUM 100 MG: 100 TABLET, FILM COATED ORAL at 09:24

## 2020-01-02 RX ADMIN — ENOXAPARIN SODIUM 40 MG: 40 INJECTION SUBCUTANEOUS at 09:24

## 2020-01-02 RX ADMIN — CALCIUM 500 MG: 500 TABLET ORAL at 09:25

## 2020-01-02 ASSESSMENT — PAIN SCALES - GENERAL
PAINLEVEL_OUTOF10: 0
PAINLEVEL_OUTOF10: 0

## 2020-01-02 NOTE — PROGRESS NOTES
Speech Language Pathology    Patient NPO d/t current GI status. ST to follow-up for dysphagia at a later date unless otherwise notified. Thank you. Shelli Porter, [address] [facility], [place], [id number]  Speech-Language Pathologist  Portable phone: [phone]

## 2020-01-02 NOTE — PLAN OF CARE
Problem: Falls - Risk of:  Goal: Will remain free from falls  Description  Will remain free from falls  1/2/2020 0108 by Yarely Main RN  Outcome: Ongoing  Note:   Patient's bed is in a low position, call light in reach, and bed alarms on. Will continue to monitor  1/1/2020 1940 by Tristin Hazel RN  Outcome: Ongoing     Problem: Risk for Impaired Skin Integrity  Goal: Tissue integrity - skin and mucous membranes  Description  Structural intactness and normal physiological function of skin and  mucous membranes.   1/2/2020 0108 by Yarely Main RN  Outcome: Ongoing  Note:   Patient is turn q 2 hours float heels and assess skin q shift  1/1/2020 1940 by Tristin Hazel RN  Outcome: Ongoing

## 2020-01-02 NOTE — PROGRESS NOTES
sulfate HFA, sodium chloride flush, magnesium hydroxide, ondansetron    IV:   lactated ringers 50 mL/hr at 01/01/20 1432         Intake/Output Summary (Last 24 hours) at 1/2/2020 1210  Last data filed at 1/2/2020 0529  Gross per 24 hour   Intake 635.83 ml   Output 4100 ml   Net -3464.17 ml       Results:  CBC:   Recent Labs     12/31/19  0503 01/01/20  0444 01/02/20  0512   WBC 12.5* 11.3* 8.7   HGB 12.1* 11.7* 12.1*   HCT 36.5* 35.2* 36.1*   MCV 98.5 98.3 98.2    163 189     BMP:   Recent Labs     12/31/19  0503 12/31/19  1528 01/01/20  0444 01/02/20  0512     --  141 139   K 3.1* 3.8 3.4* 3.4*     --  103 101   CO2 24  --  24 24   BUN 13  --  14 11   CREATININE <0.5*  --  <0.5* <0.5*     Mag: No results for input(s): MAG in the last 72 hours. Phos: No results found for: PHOS  No components found for: GLU    LIVER PROFILE:   Recent Labs     12/30/19  1723 12/31/19  0503   AST 14* 15   ALT 12 12   LIPASE 13.0  --    BILIDIR 0.4*  --    BILITOT 1.2* 1.1*   ALKPHOS 70 54     PT/INR: No results for input(s): PROTIME, INR in the last 72 hours. APTT: No results for input(s): APTT in the last 72 hours. UA:  Recent Labs     12/30/19  1851   COLORU YELLOW   PHUR 5.0   WBCUA 3   RBCUA 2   CLARITYU Clear   SPECGRAV 1.013   LEUKOCYTESUR TRACE*   UROBILINOGEN 1.0   BILIRUBINUR Negative   BLOODU Negative   GLUCOSEU Negative       Invalid input(s): ABG  Lab Results   Component Value Date    CALCIUM 8.3 01/02/2020       Assessment and Plan:    Pneumonia  Continue with Zosyn day 4  Azithromycin 4/5   CAT scan of the chest was reviewed: Concern for aspiration. Modified barium swallow did not reveal any aspiration     Sepsis  Secondary to above  Continue antibiotics  Follow blood culture     Nausea and abdominal distention:  KUB showed distended stomach, but poor quality secondary to recent barium use. Patient cannot feel any pain secondary to paralysis. N.p.o..   Continue with Reglan  Still very nauseous, and abdominal is distended, will repeat KUB to see if the braium is moving down.     Suspected influenza  Respiratory panel is negative  Discontinue Tamiflu  Influenza was ruled out     Lactic acidosis  Improved  Secondary to infection        Hypertension  Resume home meds     Left adrenal nodule  Need an MRI as an outpatient to rule out malignancy  This finding was discussed with the patient, his wife, and his son     Hx CABG  Continue with aspirin, statin. .     Code status:  Full code   DVT prophylaxis: Lovenox   Disposition:   Pending clinical improvement      Electronically signed by Justin Montes De Oca MD on 1/2/2020 at 12:10 PM

## 2020-01-03 LAB
ANION GAP SERPL CALCULATED.3IONS-SCNC: 13 MMOL/L (ref 3–16)
BASOPHILS ABSOLUTE: 0 K/UL (ref 0–0.2)
BASOPHILS RELATIVE PERCENT: 0.8 %
BLOOD CULTURE, ROUTINE: ABNORMAL
BLOOD CULTURE, ROUTINE: ABNORMAL
BUN BLDV-MCNC: 11 MG/DL (ref 7–20)
CALCIUM SERPL-MCNC: 8.5 MG/DL (ref 8.3–10.6)
CHLORIDE BLD-SCNC: 99 MMOL/L (ref 99–110)
CO2: 27 MMOL/L (ref 21–32)
CREAT SERPL-MCNC: <0.5 MG/DL (ref 0.8–1.3)
CULTURE, BLOOD 2: NORMAL
EOSINOPHILS ABSOLUTE: 0.2 K/UL (ref 0–0.6)
EOSINOPHILS RELATIVE PERCENT: 3.6 %
GFR AFRICAN AMERICAN: >60
GFR NON-AFRICAN AMERICAN: >60
GLUCOSE BLD-MCNC: 96 MG/DL (ref 70–99)
HCT VFR BLD CALC: 34.9 % (ref 40.5–52.5)
HEMOGLOBIN: 11.9 G/DL (ref 13.5–17.5)
LYMPHOCYTES ABSOLUTE: 1.9 K/UL (ref 1–5.1)
LYMPHOCYTES RELATIVE PERCENT: 32.4 %
MAGNESIUM: 2.1 MG/DL (ref 1.8–2.4)
MCH RBC QN AUTO: 33.4 PG (ref 26–34)
MCHC RBC AUTO-ENTMCNC: 34.2 G/DL (ref 31–36)
MCV RBC AUTO: 97.6 FL (ref 80–100)
MONOCYTES ABSOLUTE: 0.6 K/UL (ref 0–1.3)
MONOCYTES RELATIVE PERCENT: 10.5 %
NEUTROPHILS ABSOLUTE: 3 K/UL (ref 1.7–7.7)
NEUTROPHILS RELATIVE PERCENT: 52.7 %
ORGANISM: ABNORMAL
ORGANISM: ABNORMAL
PDW BLD-RTO: 13.9 % (ref 12.4–15.4)
PLATELET # BLD: 199 K/UL (ref 135–450)
PMV BLD AUTO: 7.8 FL (ref 5–10.5)
POTASSIUM REFLEX MAGNESIUM: 2.9 MMOL/L (ref 3.5–5.1)
POTASSIUM SERPL-SCNC: 3.9 MMOL/L (ref 3.5–5.1)
RBC # BLD: 3.58 M/UL (ref 4.2–5.9)
SODIUM BLD-SCNC: 139 MMOL/L (ref 136–145)
WBC # BLD: 5.7 K/UL (ref 4–11)

## 2020-01-03 PROCEDURE — 2580000003 HC RX 258: Performed by: INTERNAL MEDICINE

## 2020-01-03 PROCEDURE — 6360000002 HC RX W HCPCS: Performed by: INTERNAL MEDICINE

## 2020-01-03 PROCEDURE — 84132 ASSAY OF SERUM POTASSIUM: CPT

## 2020-01-03 PROCEDURE — 6370000000 HC RX 637 (ALT 250 FOR IP): Performed by: HOSPITALIST

## 2020-01-03 PROCEDURE — 85025 COMPLETE CBC W/AUTO DIFF WBC: CPT

## 2020-01-03 PROCEDURE — 6360000002 HC RX W HCPCS: Performed by: HOSPITALIST

## 2020-01-03 PROCEDURE — 2060000000 HC ICU INTERMEDIATE R&B

## 2020-01-03 PROCEDURE — 92526 ORAL FUNCTION THERAPY: CPT

## 2020-01-03 PROCEDURE — 80048 BASIC METABOLIC PNL TOTAL CA: CPT

## 2020-01-03 PROCEDURE — 36415 COLL VENOUS BLD VENIPUNCTURE: CPT

## 2020-01-03 PROCEDURE — 6370000000 HC RX 637 (ALT 250 FOR IP): Performed by: INTERNAL MEDICINE

## 2020-01-03 PROCEDURE — 83735 ASSAY OF MAGNESIUM: CPT

## 2020-01-03 RX ORDER — FUROSEMIDE 40 MG/1
40 TABLET ORAL DAILY
Status: DISCONTINUED | OUTPATIENT
Start: 2020-01-03 | End: 2020-01-04 | Stop reason: HOSPADM

## 2020-01-03 RX ORDER — POTASSIUM CHLORIDE 20 MEQ/1
40 TABLET, EXTENDED RELEASE ORAL ONCE
Status: COMPLETED | OUTPATIENT
Start: 2020-01-03 | End: 2020-01-03

## 2020-01-03 RX ADMIN — AZITHROMYCIN MONOHYDRATE 500 MG: 500 INJECTION, POWDER, LYOPHILIZED, FOR SOLUTION INTRAVENOUS at 16:59

## 2020-01-03 RX ADMIN — POTASSIUM CHLORIDE 10 MEQ: 10 INJECTION, SOLUTION INTRAVENOUS at 08:06

## 2020-01-03 RX ADMIN — PIPERACILLIN AND TAZOBACTAM 3.38 G: 3; .375 INJECTION, POWDER, LYOPHILIZED, FOR SOLUTION INTRAVENOUS at 18:05

## 2020-01-03 RX ADMIN — ENOXAPARIN SODIUM 40 MG: 40 INJECTION SUBCUTANEOUS at 08:06

## 2020-01-03 RX ADMIN — LOSARTAN POTASSIUM 100 MG: 100 TABLET, FILM COATED ORAL at 08:06

## 2020-01-03 RX ADMIN — CALCIUM 500 MG: 500 TABLET ORAL at 20:06

## 2020-01-03 RX ADMIN — ASPIRIN 81 MG: 81 TABLET, COATED ORAL at 08:06

## 2020-01-03 RX ADMIN — HYDROCHLOROTHIAZIDE 25 MG: 25 TABLET ORAL at 08:06

## 2020-01-03 RX ADMIN — CALCIUM 500 MG: 500 TABLET ORAL at 09:26

## 2020-01-03 RX ADMIN — PIPERACILLIN AND TAZOBACTAM 3.38 G: 3; .375 INJECTION, POWDER, LYOPHILIZED, FOR SOLUTION INTRAVENOUS at 01:15

## 2020-01-03 RX ADMIN — POTASSIUM CHLORIDE 10 MEQ: 10 INJECTION, SOLUTION INTRAVENOUS at 09:26

## 2020-01-03 RX ADMIN — FUROSEMIDE 40 MG: 40 TABLET ORAL at 09:26

## 2020-01-03 RX ADMIN — METOCLOPRAMIDE 5 MG: 5 INJECTION, SOLUTION INTRAMUSCULAR; INTRAVENOUS at 14:07

## 2020-01-03 RX ADMIN — POTASSIUM CHLORIDE 10 MEQ: 750 TABLET, FILM COATED, EXTENDED RELEASE ORAL at 08:06

## 2020-01-03 RX ADMIN — SODIUM CHLORIDE, PRESERVATIVE FREE 10 ML: 5 INJECTION INTRAVENOUS at 20:06

## 2020-01-03 RX ADMIN — METOCLOPRAMIDE 5 MG: 5 INJECTION, SOLUTION INTRAMUSCULAR; INTRAVENOUS at 05:35

## 2020-01-03 RX ADMIN — POTASSIUM CHLORIDE 10 MEQ: 10 INJECTION, SOLUTION INTRAVENOUS at 06:47

## 2020-01-03 RX ADMIN — POLYETHYLENE GLYCOL 3350 17 G: 17 POWDER, FOR SOLUTION ORAL at 08:06

## 2020-01-03 RX ADMIN — METOCLOPRAMIDE 5 MG: 5 INJECTION, SOLUTION INTRAMUSCULAR; INTRAVENOUS at 22:32

## 2020-01-03 RX ADMIN — SIMVASTATIN 20 MG: 20 TABLET, FILM COATED ORAL at 20:06

## 2020-01-03 RX ADMIN — POTASSIUM CHLORIDE 40 MEQ: 1500 TABLET, EXTENDED RELEASE ORAL at 12:00

## 2020-01-03 RX ADMIN — SODIUM CHLORIDE, PRESERVATIVE FREE 10 ML: 5 INJECTION INTRAVENOUS at 08:13

## 2020-01-03 RX ADMIN — PIPERACILLIN AND TAZOBACTAM 3.38 G: 3; .375 INJECTION, POWDER, LYOPHILIZED, FOR SOLUTION INTRAVENOUS at 08:05

## 2020-01-03 RX ADMIN — PANTOPRAZOLE SODIUM 40 MG: 40 TABLET, DELAYED RELEASE ORAL at 05:35

## 2020-01-03 RX ADMIN — CYANOCOBALAMIN TAB 1000 MCG 1000 MCG: 1000 TAB at 08:06

## 2020-01-03 ASSESSMENT — PAIN SCALES - GENERAL: PAINLEVEL_OUTOF10: 0

## 2020-01-03 NOTE — PROGRESS NOTES
Alert, Cooperative, Pleasant mood    Presentations   Consistencies Administered: Reg solid, Thin-straw    Positioning   Upright in bed    PO Trials:  · Thin Liquids:  By straw with No cough, choke or vocal change noted. · Regular food x 2. Pt recalls double swallow after intial review. First swallow pt c/o pain in esophagus. Second swallow WNL. Pt states the pain does not usually occur and feels it was an anomally. Dysphagia Tx:   Education:   Review of test results and rec with focus on rec for double swallow. Pt recalls and performs immediately. PO trials:    See above    Goals:   Dysphagia Goals: The patient will tolerate recommended diet without observed clinical signs of aspiration, ; appears met; ongoing  The patient/caregiver will demonstrate understanding of compensatory strategies for improved swallowing safety: appears WNL; ongoing. Assessment:   Impressions:   Dysphagia Diagnosis:  Mild oral and mild-mod pharyngeal dysphagia as noted on MBS (12/31/2019)  Pt  immediately follows compensatory strategies after review. Pt appears safe with current diet. Speech will f/u x1 unless pt d/c'd prior to next session    Diet Recommendations:  Solid consistency: Regular   Liquid consistency:  Thin   Medication administration: PO   Recommended Form of Meds: PO    Strategies:   Compensatory Swallowing Strategies: Remain upright for 30-45 minutes after meals, Upright as possible for all oral intake, Swallow 2 times per bite/sip, Small bites/sips    Education:  Consulted and agree with results and recommendations: Patient, RN, Family member  Patient Education: Completed on results/recs/plan  Patient Education Response: Needs reinforcement, Verbalizes understanding    Prognosis:   Good    Plan:     Continue Dysphagia Therapy:   Interventions: Therapeutic Interventions: Patient/Family education, Diet tolerance monitoring  Duration/Frequency of therapy while on unit: Duration/Frequency of

## 2020-01-03 NOTE — PROGRESS NOTES
potassium chloride **OR** potassium alternative oral replacement **OR** potassium chloride, calcium carbonate, sodium chloride flush, magnesium hydroxide, ondansetron    IV:        Intake/Output Summary (Last 24 hours) at 1/3/2020 1651  Last data filed at 1/3/2020 1407  Gross per 24 hour   Intake 940 ml   Output 2000 ml   Net -1060 ml       Results:  CBC:   Recent Labs     01/01/20 0444 01/02/20 0512 01/03/20  0529   WBC 11.3* 8.7 5.7   HGB 11.7* 12.1* 11.9*   HCT 35.2* 36.1* 34.9*   MCV 98.3 98.2 97.6    189 199     BMP:   Recent Labs     01/01/20 0444 01/02/20 0512 01/03/20  0529 01/03/20  1443    139 139  --    K 3.4* 3.4* 2.9* 3.9    101 99  --    CO2 24 24 27  --    BUN 14 11 11  --    CREATININE <0.5* <0.5* <0.5*  --      Mag: No results for input(s): MAG in the last 72 hours. Phos: No results found for: PHOS  No components found for: GLU    LIVER PROFILE: No results for input(s): AST, ALT, LIPASE, BILIDIR, BILITOT, ALKPHOS in the last 72 hours. Invalid input(s): AMYLASE,  ALB  PT/INR: No results for input(s): PROTIME, INR in the last 72 hours. APTT: No results for input(s): APTT in the last 72 hours. UA:No results for input(s): NITRITE, COLORU, PHUR, LABCAST, WBCUA, RBCUA, MUCUS, TRICHOMONAS, YEAST, BACTERIA, CLARITYU, SPECGRAV, LEUKOCYTESUR, UROBILINOGEN, BILIRUBINUR, BLOODU, GLUCOSEU, AMORPHOUS in the last 72 hours. Invalid input(s): Mario Infante input(s): ABG  Lab Results   Component Value Date    CALCIUM 8.5 01/03/2020       Assessment and Plan:    Pneumonia  Continue with Zosyn day 5  Azithromycin 5/5   CAT scan of the chest was reviewed: Concern for aspiration.  Modified barium swallow did not reveal any aspiration     Sepsis  Secondary to above  Continue antibiotics  Follow blood culture     Nausea and abdominal distention:  KUB no acute findings   Improving .   Continue with Reglan       Suspected influenza  Respiratory panel is negative  Discontinue

## 2020-01-03 NOTE — PROGRESS NOTES
Discussed patient's potassium level of 2.9 with Dr. Hellen Tuttle. MD ordered PO potassium instead of continuing with the IV potassium replacement protocol orders.      Electronically signed by Kushal Escudero RN on 1/3/2020 at 9:47 AM

## 2020-01-04 VITALS
BODY MASS INDEX: 33.33 KG/M2 | WEIGHT: 232.81 LBS | HEIGHT: 70 IN | HEART RATE: 83 BPM | RESPIRATION RATE: 16 BRPM | OXYGEN SATURATION: 93 % | TEMPERATURE: 97.8 F | SYSTOLIC BLOOD PRESSURE: 161 MMHG | DIASTOLIC BLOOD PRESSURE: 91 MMHG

## 2020-01-04 LAB
ANION GAP SERPL CALCULATED.3IONS-SCNC: 12 MMOL/L (ref 3–16)
BASOPHILS ABSOLUTE: 0.1 K/UL (ref 0–0.2)
BASOPHILS RELATIVE PERCENT: 1 %
BUN BLDV-MCNC: 9 MG/DL (ref 7–20)
CALCIUM SERPL-MCNC: 8.3 MG/DL (ref 8.3–10.6)
CHLORIDE BLD-SCNC: 100 MMOL/L (ref 99–110)
CO2: 28 MMOL/L (ref 21–32)
CREAT SERPL-MCNC: 0.6 MG/DL (ref 0.8–1.3)
EOSINOPHILS ABSOLUTE: 0.5 K/UL (ref 0–0.6)
EOSINOPHILS RELATIVE PERCENT: 7.3 %
GFR AFRICAN AMERICAN: >60
GFR NON-AFRICAN AMERICAN: >60
GLUCOSE BLD-MCNC: 108 MG/DL (ref 70–99)
HCT VFR BLD CALC: 37 % (ref 40.5–52.5)
HEMOGLOBIN: 12.5 G/DL (ref 13.5–17.5)
LYMPHOCYTES ABSOLUTE: 1.8 K/UL (ref 1–5.1)
LYMPHOCYTES RELATIVE PERCENT: 28.6 %
MCH RBC QN AUTO: 33.2 PG (ref 26–34)
MCHC RBC AUTO-ENTMCNC: 33.9 G/DL (ref 31–36)
MCV RBC AUTO: 98 FL (ref 80–100)
MONOCYTES ABSOLUTE: 0.7 K/UL (ref 0–1.3)
MONOCYTES RELATIVE PERCENT: 11.6 %
NEUTROPHILS ABSOLUTE: 3.3 K/UL (ref 1.7–7.7)
NEUTROPHILS RELATIVE PERCENT: 51.5 %
PDW BLD-RTO: 14.2 % (ref 12.4–15.4)
PLATELET # BLD: 216 K/UL (ref 135–450)
PMV BLD AUTO: 7.8 FL (ref 5–10.5)
POTASSIUM REFLEX MAGNESIUM: 3.6 MMOL/L (ref 3.5–5.1)
RBC # BLD: 3.77 M/UL (ref 4.2–5.9)
SODIUM BLD-SCNC: 140 MMOL/L (ref 136–145)
WBC # BLD: 6.3 K/UL (ref 4–11)

## 2020-01-04 PROCEDURE — 36415 COLL VENOUS BLD VENIPUNCTURE: CPT

## 2020-01-04 PROCEDURE — 6360000002 HC RX W HCPCS: Performed by: INTERNAL MEDICINE

## 2020-01-04 PROCEDURE — 2580000003 HC RX 258: Performed by: INTERNAL MEDICINE

## 2020-01-04 PROCEDURE — 6370000000 HC RX 637 (ALT 250 FOR IP): Performed by: INTERNAL MEDICINE

## 2020-01-04 PROCEDURE — 6370000000 HC RX 637 (ALT 250 FOR IP): Performed by: HOSPITALIST

## 2020-01-04 PROCEDURE — 94760 N-INVAS EAR/PLS OXIMETRY 1: CPT

## 2020-01-04 PROCEDURE — 6360000002 HC RX W HCPCS: Performed by: HOSPITALIST

## 2020-01-04 PROCEDURE — 80048 BASIC METABOLIC PNL TOTAL CA: CPT

## 2020-01-04 PROCEDURE — 85025 COMPLETE CBC W/AUTO DIFF WBC: CPT

## 2020-01-04 RX ORDER — AMOXICILLIN AND CLAVULANATE POTASSIUM 875; 125 MG/1; MG/1
1 TABLET, FILM COATED ORAL 2 TIMES DAILY
Qty: 6 TABLET | Refills: 0 | Status: SHIPPED | OUTPATIENT
Start: 2020-01-04 | End: 2020-01-07

## 2020-01-04 RX ADMIN — ENOXAPARIN SODIUM 40 MG: 40 INJECTION SUBCUTANEOUS at 08:42

## 2020-01-04 RX ADMIN — PIPERACILLIN AND TAZOBACTAM 3.38 G: 3; .375 INJECTION, POWDER, LYOPHILIZED, FOR SOLUTION INTRAVENOUS at 01:20

## 2020-01-04 RX ADMIN — PIPERACILLIN AND TAZOBACTAM 3.38 G: 3; .375 INJECTION, POWDER, LYOPHILIZED, FOR SOLUTION INTRAVENOUS at 08:42

## 2020-01-04 RX ADMIN — CYANOCOBALAMIN TAB 1000 MCG 1000 MCG: 1000 TAB at 08:42

## 2020-01-04 RX ADMIN — POTASSIUM CHLORIDE 10 MEQ: 750 TABLET, FILM COATED, EXTENDED RELEASE ORAL at 08:42

## 2020-01-04 RX ADMIN — POLYETHYLENE GLYCOL 3350 17 G: 17 POWDER, FOR SOLUTION ORAL at 08:43

## 2020-01-04 RX ADMIN — SODIUM CHLORIDE, PRESERVATIVE FREE 10 ML: 5 INJECTION INTRAVENOUS at 08:42

## 2020-01-04 RX ADMIN — LOSARTAN POTASSIUM 100 MG: 100 TABLET, FILM COATED ORAL at 08:42

## 2020-01-04 RX ADMIN — HYDROCHLOROTHIAZIDE 25 MG: 25 TABLET ORAL at 08:42

## 2020-01-04 RX ADMIN — ASPIRIN 81 MG: 81 TABLET, COATED ORAL at 08:42

## 2020-01-04 RX ADMIN — FUROSEMIDE 40 MG: 40 TABLET ORAL at 08:42

## 2020-01-04 RX ADMIN — METOCLOPRAMIDE 5 MG: 5 INJECTION, SOLUTION INTRAMUSCULAR; INTRAVENOUS at 06:19

## 2020-01-04 RX ADMIN — CALCIUM 500 MG: 500 TABLET ORAL at 08:42

## 2020-01-04 RX ADMIN — PANTOPRAZOLE SODIUM 40 MG: 40 TABLET, DELAYED RELEASE ORAL at 06:20

## 2020-01-04 ASSESSMENT — PAIN SCALES - GENERAL: PAINLEVEL_OUTOF10: 0

## 2020-01-04 NOTE — DISCHARGE SUMMARY
Hospitalist Discharge Summary    Patient ID:  Irma Chappell  [id number]  89 y.o.  1935    Admit date: 12/30/2019    Discharge date: 1/4/2020    Disposition: home    Admission Diagnoses:   Patient Active Problem List   Diagnosis    Sepsis ([address]    High anion gap metabolic acidosis    Hypokalemia    Elevated lactic acid level    Acute metabolic encephalopathy    Generalized weakness       Discharge Diagnoses: Principal Problem:    Sepsis ([address]  Active Problems:    High anion gap metabolic acidosis    Hypokalemia    Elevated lactic acid level    Acute metabolic encephalopathy    Generalized weakness  Resolved Problems:    * No resolved hospital problems. *      Code Status:  Prior    Condition:  Stable    Discharge Diet: Diet:  No diet orders on file    PCP to do list:        Hospital Course:     Patient presented to the hospital with fever. He was found to have a pneumonia. He was treated with Zosyn, and azithromycin. His symptoms improved and he is being discharged to follow-up with his primary care as an outpatient. Pneumonia  Received 5 days of Zosyn.   We will switch to Augmentin on discharge  Azithromycin 5/5   CAT scan of the chest was reviewed: Concern for aspiration.  Modified barium swallow did not reveal any aspiration     Sepsis  Secondary to above  Continue antibiotics  Culture data were negative     Nausea and abdominal distention:  KUB no acute findings   Improved       Lactic acidosis  Improved  Secondary to infection        Hypertension  Continue with home meds     Left adrenal nodule  Need an MRI as an outpatient to rule out malignancy  This finding was discussed with the patient, his wife, and his son     Hx CABG  Continue with aspirin, statin    Discharge Medications:   Discharge Medication List as of 1/4/2020 11:13 AM      START taking these medications    Details   amoxicillin-clavulanate (AUGMENTIN) 875-125 MG per tablet Take 1 tablet by mouth 2 times daily for 3 days, Disp-6 tablet, R-0Normal           Discharge Medication List as of 1/4/2020 11:13 AM        Discharge Medication List as of 1/4/2020 11:13 AM      CONTINUE these medications which have NOT CHANGED    Details   alendronate (FOSAMAX) 70 MG tablet Take 70 mg by mouth every 7 daysHistorical Med      aspirin 81 MG tablet Take 81 mg by mouth daily      albuterol sulfate  (90 BASE) MCG/ACT inhaler Inhale 2 puffs into the lungs as needed for Wheezing      lovastatin (MEVACOR) 40 MG tablet Take 40 mg by mouth nightlyHistorical Med      potassium chloride (KLOR-CON M) 20 MEQ extended release tablet Take 1 tablet by mouth daily, Disp-30 tablet, R-3Normal      olmesartan-hydrochlorothiazide (BENICAR HCT) 20-12.5 MG per tablet Take 1 tablet by mouth 2 times daily, Disp-60 tablet, R-11Normal      furosemide (LASIX) 40 MG tablet Take 1 tablet by mouth daily, Disp-30 tablet, R-5Normal      amLODIPine (NORVASC) 5 MG tablet Take 5 mg by mouth daily      calcium carbonate (OSCAL) 500 MG TABS tablet Take 500 mg by mouth daily Historical Med           Discharge Medication List as of 1/4/2020 11:13 AM      STOP taking these medications       Potassium Chloride (KLOR-CON 10 PO) Comments:   Reason for Stopping:         lovastatin (ALTOPREV) 40 MG ER tablet Comments:   Reason for Stopping:         losartan-hydrochlorothiazide (HYZAAR) 100-25 MG per tablet Comments:   Reason for Stopping:         finasteride (PROSCAR) 5 MG tablet Comments:   Reason for Stopping:         acetaminophen (TYLENOL) 650 MG suppository Comments:   Reason for Stopping:         Cholecalciferol (VITAMIN D3) 5000 UNITS TABS Comments:   Reason for Stopping:         cyanocobalamin 1000 MCG tablet Comments:   Reason for Stopping:         polyethylene glycol (GLYCOLAX) powder Comments:   Reason for Stopping:                   Procedures:     Assessment on Discharge: Stable, improved     Discharge ROS:  A complete review of systems was asked am COMPARISON: None. HISTORY: ORDERING SYSTEM PROVIDED HISTORY: Abdo distension TECHNOLOGIST PROVIDED HISTORY: Reason for exam:->Abdo distension FINDINGS: Dense contrast in the colon from recent modified barium swallow. Stool seen in the rectum. No evidence of small bowel dilation. The stomach appears to be distended and air-filled. No abnormal soft tissue mass. Vascular phleboliths in the pelvis. No skeletal     Limited abdominal radiograph with dense barium in the colon from a recent modified study. Air-filled, distended stomach. No current evidence of small bowel obstruction. Ct Head Wo Contrast    Result Date: 12/30/2019  EXAMINATION: CT OF THE HEAD WITHOUT CONTRAST  12/30/2019 5:29 pm TECHNIQUE: CT of the head was performed without the administration of intravenous contrast. Dose modulation, iterative reconstruction, and/or weight based adjustment of the mA/kV was utilized to reduce the radiation dose to as low as reasonably achievable. COMPARISON: 11/29/2019 HISTORY: ORDERING SYSTEM PROVIDED HISTORY: Confusion TECHNOLOGIST PROVIDED HISTORY: Has a \"code stroke\" or \"stroke alert\" been called? ->No Reason for exam:->Confusion Reason for Exam: fever, confusion, ams change, Acuity: Acute Type of Exam: Initial Relevant Medical/Surgical History: hx prostate ca FINDINGS: BRAIN/VENTRICLES: There is no hemorrhage, edema, or mass effect. There is generalized atrophy with chronic small vessel ischemic white matter change. There are no abnormal extra-axial fluid collections. ORBITS: The visualized portion of the orbits demonstrate no acute abnormality. SINUSES: Fluid and mucosal thickening in the left maxillary sinus. Mucosal thickening in left ethmoid air cells SOFT TISSUES/SKULL:  No acute abnormality of the visualized skull or soft tissues. No acute intracranial abnormality.  Left maxillary sinusitis     Ct Chest Wo Contrast    Result Date: 12/30/2019  EXAMINATION: CT OF THE CHEST WITHOUT CONTRAST COMPARISON: None. HISTORY: ORDERING SYSTEM PROVIDED HISTORY: Abdominal pain and GB stones TECHNOLOGIST PROVIDED HISTORY: Reason for exam:->Abdominal pain and GB stones FINDINGS: LIVER:  The liver demonstrates normal echogenicity without evidence of intrahepatic biliary ductal dilatation. BILIARY SYSTEM:  The gallbladder is contracted and filled with shadowing echogenic foci consistent with stones. The gallbladder wall measures 4 mm and this may relate to the degree of distension. There is a negative sonographic Mireles sign. Common bile duct is within normal limits measuring 5 mm. RIGHT KIDNEY: The right kidney is grossly unremarkable without evidence of hydronephrosis. PANCREAS:  Visualized portions of the pancreas are unremarkable. OTHER: No evidence of right upper quadrant ascites. Cholelithiasis with a contracted gallbladder. Fluoroscopy Modified Barium Swallow With Video    Result Date: 12/31/2019  EXAMINATION: MODIFIED BARIUM SWALLOW WAS PERFORMED IN CONJUNCTION WITH SPEECH PATHOLOGY SERVICES TECHNIQUE: Fluoroscopic evaluation of the swallowing mechanism was performed with multiple consistency of barium product. FLUOROSCOPY DOSE AND TYPE OR TIME AND EXPOSURES: 2.3 minutes fluoroscopy, 1 fluoroscopic image COMPARISON: None HISTORY: ORDERING SYSTEM PROVIDED HISTORY: dysphagia TECHNOLOGIST PROVIDED HISTORY: Reason for exam:->dysphagia FINDINGS: No martin aspiration was seen with multiple barium consistencies. Patient tolerated procedure without immediate complication. No evidence of aspiration. Please see separate speech pathology report for full discussion of findings and recommendations.            Last Labs on Discharge:     Recent Results (from the past 24 hour(s))   Potassium    Collection Time: 01/03/20  2:43 PM   Result Value Ref Range    Potassium 3.9 3.5 - 5.1 mmol/L   Basic Metabolic Panel w/ Reflex to MG    Collection Time: 01/04/20  5:08 AM   Result Value Ref Range    Sodium 140 136 - 145 mmol/L    Potassium reflex Magnesium 3.6 3.5 - 5.1 mmol/L    Chloride 100 99 - 110 mmol/L    CO2 28 21 - 32 mmol/L    Anion Gap 12 3 - 16    Glucose 108 (H) 70 - 99 mg/dL    BUN 9 7 - 20 mg/dL    CREATININE 0.6 (L) 0.8 - 1.3 mg/dL    GFR Non-African American >60 >60    GFR African American >60 >60    Calcium 8.3 8.3 - 10.6 mg/dL   CBC Auto Differential    Collection Time: 01/04/20  5:08 AM   Result Value Ref Range    WBC 6.3 4.0 - 11.0 K/uL    RBC 3.77 (L) 4.20 - 5.90 M/uL    Hemoglobin 12.5 (L) 13.5 - 17.5 g/dL    Hematocrit 37.0 (L) 40.5 - 52.5 %    MCV 98.0 80.0 - 100.0 fL    MCH 33.2 26.0 - 34.0 pg    MCHC 33.9 31.0 - 36.0 g/dL    RDW 14.2 12.4 - 15.4 %    Platelets 180 240 - 482 K/uL    MPV 7.8 5.0 - 10.5 fL    Neutrophils % 51.5 %    Lymphocytes % 28.6 %    Monocytes % 11.6 %    Eosinophils % 7.3 %    Basophils % 1.0 %    Neutrophils Absolute 3.3 1.7 - 7.7 K/uL    Lymphocytes Absolute 1.8 1.0 - 5.1 K/uL    Monocytes Absolute 0.7 0.0 - 1.3 K/uL    Eosinophils Absolute 0.5 0.0 - 0.6 K/uL    Basophils Absolute 0.1 0.0 - 0.2 K/uL         Follow up: with Juan Gregorio    Note that over 30 minutes was spent in preparing discharge papers, discussing discharge with patient, medication review, etc.    Thank you Juan Gregorio for the opportunity to be involved in this patient's care. If you have any questions or concerns please feel free to contact me at [phone].     Electronically signed by Joel Mcgovern MD on 1/4/2020 at 2:15 PM

## 2020-01-04 NOTE — PLAN OF CARE
Problem: Falls - Risk of:  Goal: Will remain free from falls  Description  Will remain free from falls  1/4/2020 1007 by Gita Sandhu RN  Outcome: Ongoing  1/4/2020 0302 by Augusta Fuentes RN  Outcome: Ongoing     Problem: Risk for Impaired Skin Integrity  Goal: Tissue integrity - skin and mucous membranes  Description  Structural intactness and normal physiological function of skin and  mucous membranes.   1/4/2020 1007 by Gita Sandhu RN  Outcome: Ongoing  1/4/2020 0302 by Augusta Fuentes RN  Outcome: Ongoing     Problem: Urinary Elimination:  Goal: Signs and symptoms of infection will decrease  Description  Signs and symptoms of infection will decrease  Outcome: Ongoing

## 2020-01-04 NOTE — DISCHARGE INSTR - COC
Indicated Last Indicated By Review Planned Expiration Resolved Resolved By    None active    Resolved    C-diff Rule Out  12/30/19 12/30/19 Clostridium Difficile Toxin/Antigen (Ordered)   12/31/19 Dwayne Chapman RN          Nurse Assessment:  Last Vital Signs: BP (!) 161/91   Pulse 83   Temp 97.8 °F (36.6 °C) (Oral)   Resp 16   Ht 5' 10\" (1.778 m)   Wt 232 lb 12.9 oz (105.6 kg)   SpO2 93%   BMI 33.40 kg/m²     Last documented pain score (0-10 scale): Pain Level: 0  Last Weight:   Wt Readings from Last 1 Encounters:   01/04/20 232 lb 12.9 oz (105.6 kg)     Mental Status:  oriented    IV Access:  - None    Nursing Mobility/ADLs:  Walking   Dependent  Transfer  Dependent  Bathing  Dependent  Dressing  Dependent  Toileting  Dependent  Feeding  Assisted  Med Admin  Assisted  Med Delivery   whole    Wound Care Documentation and Therapy:        Elimination:  Continence:   · Bowel: No  · Bladder: Yes  Urinary Catheter: Removal Date 01/04/2020   Colostomy/Ileostomy/Ileal Conduit: No       Date of Last BM: ***    Intake/Output Summary (Last 24 hours) at 1/4/2020 1024  Last data filed at 1/4/2020 0751  Gross per 24 hour   Intake 440 ml   Output 2900 ml   Net -2460 ml     I/O last 3 completed shifts: In: 680 [P.O.:480; IV Piggyback:200]  Out: 2850 [Urine:2850]    Safety Concerns: At Risk for Falls    Impairments/Disabilities:      None    Nutrition Therapy:  Current Nutrition Therapy:   - Oral Diet:  General    Routes of Feeding: Oral  Liquids: Thin Liquids  Daily Fluid Restriction: no  Last Modified Barium Swallow with Video (Video Swallowing Test): not done    Treatments at the Time of Hospital Discharge:   Respiratory Treatments:   Oxygen Therapy:  is not on home oxygen therapy.   Ventilator:    - No ventilator support    Rehab Therapies: Physical Therapy  Weight Bearing Status/Restrictions: No weight bearing restirctions  Other Medical Equipment (for information only, NOT a DME order):  hospital bed  Other Treatments:     Patient's personal belongings (please select all that are sent with patient):  None    RN SIGNATURE:  Electronically signed by Shaina Garvin RN on 1/4/20 at 11:12 AM    CASE MANAGEMENT/SOCIAL WORK SECTION    Inpatient Status Date: ***    Readmission Risk Assessment Score:  Readmission Risk              Risk of Unplanned Readmission:        14           Discharging to Facility/ Agency   · Name:   · Address:  · Phone:  · Fax:    Dialysis Facility (if applicable)   · Name:  · Address:  · Dialysis Schedule:  · Phone:  · Fax:    / signature: {Esignature:[id number]}    PHYSICIAN SECTION    Prognosis: {Prognosis:9470418073}    Condition at Discharge: [facility] Patient Condition:047449993}    Rehab Potential (if transferring to Rehab): {Prognosis:5262283463}    Recommended Labs or Other Treatments After Discharge: ***    Physician Certification: I certify the above information and transfer of Bernardo Arguello  is necessary for the continuing treatment of the diagnosis listed and that he requires {Admit to Appropriate Level of Care:44496} for {GREATER/LESS:773965213} 30 days.      Update Admission H&P: {CHP DME Changes in YSFPS:873546336}    PHYSICIAN SIGNATURE:  {Esignature:[id number]}

## 2020-01-13 LAB
FINAL REPORT: NORMAL
PRELIMINARY: NORMAL

## 2020-01-23 ENCOUNTER — HOSPITAL ENCOUNTER (OUTPATIENT)
Dept: CT IMAGING | Age: 85
Discharge: HOME | End: 2020-01-23
Payer: MEDICARE

## 2020-01-23 ENCOUNTER — TELEPHONE (OUTPATIENT)
Dept: CARDIOLOGY CLINIC | Age: 85
End: 2020-01-23

## 2020-01-23 PROCEDURE — 71270 CT THORAX DX C-/C+: CPT

## 2020-01-23 PROCEDURE — 6360000004 HC RX CONTRAST MEDICATION: Performed by: INTERNAL MEDICINE

## 2020-01-23 RX ADMIN — IOPAMIDOL 75 ML: 755 INJECTION, SOLUTION INTRAVENOUS at 11:05

## 2020-01-23 NOTE — TELEPHONE ENCOUNTER
Odin Hernandez from [facility] calling to clarify testing ordered for AAA. Should order be for CT of chest or CTA?

## 2020-01-28 ENCOUNTER — HOSPITAL ENCOUNTER (OUTPATIENT)
Dept: NON INVASIVE DIAGNOSTICS | Age: 85
Discharge: HOME | End: 2020-01-28
Payer: MEDICARE

## 2020-01-28 ENCOUNTER — OFFICE VISIT (OUTPATIENT)
Dept: CARDIOLOGY CLINIC | Age: 85
End: 2020-01-28
Payer: MEDICARE

## 2020-01-28 ENCOUNTER — HOSPITAL ENCOUNTER (OUTPATIENT)
Dept: VASCULAR LAB | Age: 85
Discharge: HOME | End: 2020-01-28
Payer: MEDICARE

## 2020-01-28 VITALS
WEIGHT: 230 LBS | DIASTOLIC BLOOD PRESSURE: 72 MMHG | SYSTOLIC BLOOD PRESSURE: 122 MMHG | BODY MASS INDEX: 32.93 KG/M2 | OXYGEN SATURATION: 98 % | HEART RATE: 78 BPM | HEIGHT: 70 IN

## 2020-01-28 PROBLEM — I71.40 ABDOMINAL AORTIC ANEURYSM (AAA) WITHOUT RUPTURE: Status: ACTIVE | Noted: 2020-01-28

## 2020-01-28 PROBLEM — I10 ESSENTIAL HYPERTENSION: Status: ACTIVE | Noted: 2020-01-28

## 2020-01-28 PROBLEM — I25.10 CORONARY ARTERY DISEASE INVOLVING NATIVE CORONARY ARTERY OF NATIVE HEART WITHOUT ANGINA PECTORIS: Status: ACTIVE | Noted: 2020-01-28

## 2020-01-28 PROBLEM — E78.2 MIXED HYPERLIPIDEMIA: Status: ACTIVE | Noted: 2020-01-28

## 2020-01-28 LAB
LV EF: 53 %
LVEF MODALITY: NORMAL

## 2020-01-28 PROCEDURE — G8484 FLU IMMUNIZE NO ADMIN: HCPCS | Performed by: INTERNAL MEDICINE

## 2020-01-28 PROCEDURE — 1111F DSCHRG MED/CURRENT MED MERGE: CPT | Performed by: INTERNAL MEDICINE

## 2020-01-28 PROCEDURE — G8427 DOCREV CUR MEDS BY ELIG CLIN: HCPCS | Performed by: INTERNAL MEDICINE

## 2020-01-28 PROCEDURE — 93970 EXTREMITY STUDY: CPT

## 2020-01-28 PROCEDURE — 1036F TOBACCO NON-USER: CPT | Performed by: INTERNAL MEDICINE

## 2020-01-28 PROCEDURE — G8417 CALC BMI ABV UP PARAM F/U: HCPCS | Performed by: INTERNAL MEDICINE

## 2020-01-28 PROCEDURE — 1123F ACP DISCUSS/DSCN MKR DOCD: CPT | Performed by: INTERNAL MEDICINE

## 2020-01-28 PROCEDURE — 4040F PNEUMOC VAC/ADMIN/RCVD: CPT | Performed by: INTERNAL MEDICINE

## 2020-01-28 PROCEDURE — 99214 OFFICE O/P EST MOD 30 MIN: CPT | Performed by: INTERNAL MEDICINE

## 2020-01-28 PROCEDURE — 93306 TTE W/DOPPLER COMPLETE: CPT

## 2020-01-28 RX ORDER — ACETAMINOPHEN 325 MG/1
325 TABLET ORAL 2 TIMES DAILY
Status: ON HOLD | COMMUNITY
End: 2022-08-02

## 2020-01-28 NOTE — PROGRESS NOTES
[address]   Cardiac Consultation    Referring Provider:  Marian Malcolm MD     Chief Complaint   Patient presents with    Follow-up     no cardiac complaints    Coronary Artery Disease    Hypertension    Hyperlipidemia        History of Present Illness:   Audie Phelps is here as a NEW patient for interventional cardiology for SOB, dizziness, edema, and a histroy of coronary artery disease- HX PCI and CABG, AAA, right BBB, hypertension, hyperlipidemia. He was admitted to [facility] 26 1/2020 for 5 days and treated for PNA. Today he states he has been feeling better since he left the hospital. During his hospitalization his legs were elevated and his leg swelling decreased. This has started to increase again now that his legs are dependent most of the day. He continues to take lasix daily. He had been having dizziness while he was sick, but this has resolved. He has SOB at baseline that is unchanged. His blood pressure at home runs 120/60-70's. Past Medical History:   has a past medical history of Arthritis, CAD (coronary artery disease), History of blood transfusion, Hyperlipidemia, Hypertension, and Prostate cancer ([facility] [address] Surgical History:   has a past surgical history that includes Cardiac surgery and back surgery. Social History:   reports that he has quit smoking. He has never used smokeless tobacco. He reports current alcohol use. He reports that he does not use drugs. Family History:  family history includes Alzheimer's Disease in his mother; Heart Disease in his father. Home Medications:  Prior to Admission medications    Medication Sig Start Date End Date Taking?  Authorizing Provider   acetaminophen (TYLENOL) 325 MG tablet Take 650 mg by mouth every 6 hours as needed for Pain   Yes Historical Provider, MD   lovastatin (MEVACOR) 40 MG tablet Take 40 mg by mouth nightly   Yes Historical Provider, MD   potassium chloride (KLOR-CON M) 20 MEQ extended release tablet Take 1 hives.    Physical Examination:    Vitals:    01/28/20 1405   BP: 122/72   Pulse: 78   SpO2: 98%        Constitutional and General Appearance: NAD   Respiratory:  · Normal excursion and expansion without use of accessory muscles  · Resp Auscultation: Normal breath sounds without dullness  Cardiovascular:  · The apical impulses not displaced  · Heart tones are crisp and normal  · Cervical veins are not engorged  · The carotid upstroke is normal in amplitude and contour without delay or bruit  · Normal S1S2, No S3, No Murmur  · Peripheral pulses are symmetrical and full  · There is no clubbing, cyanosis of the extremities. · 3+ BLE edema  · Femoral Arteries: 2+ and equal  · Pedal Pulses: 2+ and equal   Abdomen:  · No masses or tenderness  · Liver/Spleen: No Abnormalities Noted  Neurological/Psychiatric:  · Alert and oriented in all spheres  · Moves all extremities well  · Exhibits normal gait balance and coordination  · No abnormalities of mood, affect, memory, mentation, or behavior are noted    CT chest 1/23/2020  Ascending aorta measures up to 4.6 cm. No obvious change compared to prior given technical factors   Patchy tree-in-bud nodularity left upper lobe, likely postinflammatory-infectious. Right lower lobe is better aerated compared to prior   Cholelithiasis and nonobstructing left renal calculus. Stable left adrenal nodule     Echocardiogram 1/28/2020  Summary   Global left ventricular function is normal with ejection fraction estimated   from 50-55%   Grade I diastolic dysfunction with normal LV filling pressures    Vascular 1/2020  No DVT    Assessment:   Coronary artery disease- h/o PCI. CABG. Stable w/o angina  Dizziness - uncertain etiology. Description c/w vertigo. Improve when BP meds spread out over 24hrs. Now minimal. Never took meclizine. History of CABG in 1991   TAA- 4.6 by CT in 3/2017. Repeat 1/2020 stable. Right BBB  Leg edema - chronic, improved w/ lasix.  Multifactorial including

## 2020-01-29 ENCOUNTER — TELEPHONE (OUTPATIENT)
Dept: CARDIOLOGY CLINIC | Age: 85
End: 2020-01-29

## 2020-01-29 LAB
ANION GAP SERPL CALCULATED.3IONS-SCNC: 7 MMOL/L (ref 6–18)
BUN BLDV-MCNC: 14 MG/DL (ref 8–26)
CALCIUM SERPL-MCNC: 8.9 MG/DL (ref 8.5–10.5)
CHLORIDE BLD-SCNC: 103 MEQ/L (ref 101–111)
CO2: 31 MMOL/L (ref 24–36)
CREAT SERPL-MCNC: 0.62 MG/DL (ref 0.64–1.27)
GFR AFRICAN AMERICAN: 103 ML/MIN/1.73 M2
GFR NON-AFRICAN AMERICAN: 89 ML/MIN/1.73 M2
GLUCOSE BLD-MCNC: 140 MG/DL (ref 70–99)
POTASSIUM SERPL-SCNC: 3.5 MEQ/L (ref 3.6–5.1)
SODIUM BLD-SCNC: 141 MEQ/L (ref 135–145)

## 2020-01-29 RX ORDER — SPIRONOLACTONE 25 MG/1
25 TABLET ORAL DAILY
Qty: 90 TABLET | Refills: 3 | Status: SHIPPED | OUTPATIENT
Start: 2020-01-29 | End: 2020-09-21

## 2020-01-29 NOTE — TELEPHONE ENCOUNTER
Spoke with Toni Thompson. Will fax over order for lab work in 1-2 weeks. Aldactone ordered.  Medications sent to pharmacy

## 2020-02-10 LAB
ANION GAP SERPL CALCULATED.3IONS-SCNC: 10 MMOL/L (ref 6–18)
BUN BLDV-MCNC: 24 MG/DL (ref 8–26)
CALCIUM SERPL-MCNC: 9.3 MG/DL (ref 8.5–10.5)
CHLORIDE BLD-SCNC: 101 MEQ/L (ref 101–111)
CO2: 29 MMOL/L (ref 24–36)
CREAT SERPL-MCNC: 0.83 MG/DL (ref 0.64–1.27)
GFR AFRICAN AMERICAN: 92 ML/MIN/1.73 M2
GFR NON-AFRICAN AMERICAN: 79 ML/MIN/1.73 M2
GLUCOSE BLD-MCNC: 107 MG/DL (ref 70–99)
POTASSIUM SERPL-SCNC: 4.9 MEQ/L (ref 3.6–5.1)
SODIUM BLD-SCNC: 140 MEQ/L (ref 135–145)

## 2020-02-11 RX ORDER — POTASSIUM CHLORIDE 750 MG/1
10 TABLET, EXTENDED RELEASE ORAL DAILY
Qty: 90 TABLET | Refills: 3 | Status: SHIPPED | OUTPATIENT
Start: 2020-02-11 | End: 2020-09-29 | Stop reason: SDUPTHER

## 2020-02-11 NOTE — TELEPHONE ENCOUNTER
----- Message from Humberto Solares MD sent at 2/11/2020 10:24 AM EST -----  Call daughter  Labs look good  K normal  Aldactone has brought this up  Cut K supp in half  Repeat BMP 2 weeks

## 2020-03-02 LAB
ANION GAP SERPL CALCULATED.3IONS-SCNC: 8 MMOL/L (ref 6–18)
BUN BLDV-MCNC: 40 MG/DL (ref 8–26)
CALCIUM SERPL-MCNC: 9.3 MG/DL (ref 8.5–10.5)
CHLORIDE BLD-SCNC: 104 MEQ/L (ref 101–111)
CO2: 30 MMOL/L (ref 24–36)
CREAT SERPL-MCNC: 1.04 MG/DL (ref 0.64–1.27)
GFR AFRICAN AMERICAN: 74 ML/MIN/1.73 M2
GFR NON-AFRICAN AMERICAN: 64 ML/MIN/1.73 M2
GLUCOSE BLD-MCNC: 103 MG/DL (ref 70–99)
POTASSIUM SERPL-SCNC: 4.7 MEQ/L (ref 3.6–5.1)
SODIUM BLD-SCNC: 142 MEQ/L (ref 135–145)

## 2020-03-03 ENCOUNTER — TELEPHONE (OUTPATIENT)
Dept: CARDIOLOGY CLINIC | Age: 85
End: 2020-03-03

## 2020-03-03 NOTE — TELEPHONE ENCOUNTER
----- Message from Mark Edge MD sent at 3/3/2020  3:37 PM EST -----  Call K looks good but Kidneys show a little dry. prob need to back off on the diuretics. Cut aldactone in half.  ie 12.5 mg daily  Repeat labs in 2-3 weeks

## 2020-03-05 ENCOUNTER — TELEPHONE (OUTPATIENT)
Dept: CARDIOLOGY CLINIC | Age: 85
End: 2020-03-05

## 2020-03-09 NOTE — PROGRESS NOTES
bring valuables. Do not wear any make-up or nail polish on your fingers or toes      For your safety, please do not wear any jewelry or body piercing's on the day of surgery. All jewelry must be removed. If you have dentures, they will be removed before going to operating room. For your convenience, we will provide you with a container. If you wear contact lenses or glasses, they will be removed, please bring a case for them. If you have a living will and a durable power of  for healthcare, please bring in a copy. As part of our patient safety program to minimize surgical site infections, we ask you to do the following:    · Please notify your surgeon if you develop any illness between         now and the  day of your surgery. · This includes a cough, cold, fever, sore throat, nausea,         or vomiting, and diarrhea, etc.  ·  Please notify your surgeon if you experience dizziness, shortness         of breath or blurred vision between now and the time of your surgery. Do not shave your operative site 96 hours prior to surgery. For face and neck surgery, men may use an electric razor 48 hours   prior to surgery. You may shower the night before surgery or the morning of   your surgery with an antibacterial soap. You will need to bring a photo ID and insurance card    [facility] has an onsite pharmacy, would you like to utilize our pharmacy     If you will be staying overnight and use a C-pap machine, please bring   your C-pap to hospital     Our goal is to provide you with excellent care, therefore, visitors will be limited to two(2) in the room at a time so that we may focus on providing this care for you. Please contact pre-admission testing if you have any further questions.                  [facility] phone number:  [address] PAT fax number:  [phone]  Please note these are generalized instructions for all surgical cases, you may be provided with more specific instructions according to your surgery.

## 2020-03-10 ENCOUNTER — HOSPITAL ENCOUNTER (OUTPATIENT)
Age: 85
Setting detail: OUTPATIENT SURGERY
Discharge: HOME | End: 2020-03-10
Attending: PODIATRIST | Admitting: PODIATRIST
Payer: MEDICARE

## 2020-03-10 VITALS
HEART RATE: 75 BPM | RESPIRATION RATE: 16 BRPM | HEIGHT: 69 IN | WEIGHT: 230 LBS | DIASTOLIC BLOOD PRESSURE: 55 MMHG | BODY MASS INDEX: 34.07 KG/M2 | SYSTOLIC BLOOD PRESSURE: 106 MMHG | OXYGEN SATURATION: 98 % | TEMPERATURE: 98.2 F

## 2020-03-10 PROBLEM — L97.523 ULCER OF LEFT FOOT, WITH NECROSIS OF MUSCLE (HCC): Status: ACTIVE | Noted: 2020-03-10

## 2020-03-10 PROCEDURE — 6370000000 HC RX 637 (ALT 250 FOR IP): Performed by: PODIATRIST

## 2020-03-10 PROCEDURE — 3600000013 HC SURGERY LEVEL 3 ADDTL 15MIN: Performed by: PODIATRIST

## 2020-03-10 PROCEDURE — 2500000003 HC RX 250 WO HCPCS: Performed by: PODIATRIST

## 2020-03-10 PROCEDURE — 2709999900 HC NON-CHARGEABLE SUPPLY: Performed by: PODIATRIST

## 2020-03-10 PROCEDURE — 3600000003 HC SURGERY LEVEL 3 BASE: Performed by: PODIATRIST

## 2020-03-10 PROCEDURE — 7100000010 HC PHASE II RECOVERY - FIRST 15 MIN: Performed by: PODIATRIST

## 2020-03-10 PROCEDURE — 7100000011 HC PHASE II RECOVERY - ADDTL 15 MIN: Performed by: PODIATRIST

## 2020-03-10 DEVICE — IMPLANTABLE DEVICE: Type: IMPLANTABLE DEVICE | Site: FOOT | Status: FUNCTIONAL

## 2020-03-10 RX ORDER — CLINDAMYCIN HYDROCHLORIDE 300 MG/1
300 CAPSULE ORAL 3 TIMES DAILY
Qty: 21 CAPSULE | Refills: 0 | Status: SHIPPED | OUTPATIENT
Start: 2020-03-10 | End: 2020-03-17

## 2020-03-10 RX ORDER — CLINDAMYCIN HYDROCHLORIDE 150 MG/1
600 CAPSULE ORAL ONCE
Status: COMPLETED | OUTPATIENT
Start: 2020-03-10 | End: 2020-03-10

## 2020-03-10 RX ORDER — LIDOCAINE HYDROCHLORIDE 20 MG/ML
INJECTION, SOLUTION EPIDURAL; INFILTRATION; INTRACAUDAL; PERINEURAL
Status: COMPLETED | OUTPATIENT
Start: 2020-03-10 | End: 2020-03-10

## 2020-03-10 RX ADMIN — CLINDAMYCIN HYDROCHLORIDE 600 MG: 150 CAPSULE ORAL at 10:54

## 2020-03-10 ASSESSMENT — PAIN SCALES - GENERAL
PAINLEVEL_OUTOF10: 0

## 2020-03-10 ASSESSMENT — PAIN - FUNCTIONAL ASSESSMENT: PAIN_FUNCTIONAL_ASSESSMENT: 0-10

## 2020-03-10 NOTE — H&P
Preoperative H&P Update    The patient's History and Physical in the medical record from   March, 5, 2020 was reviewed by me today. I reviewed the HPI, medications, allergies, reason for surgery, diagnosis and treatment plan and there has been no change. The patient was evaluated by me today. The risk, benefits, and alternatives of the proposed procedure have been explained to the patient (or appropriate guardian) and understanding verbalized. All questions answered. Patient wishes to proceed. Physical exam findings for this update include:    Vitals:    03/10/20 1049   BP: (!) 104/55   Pulse: 85   Resp: 18   Temp: 97.7 °F (36.5 °C)   SpO2: 99%     Airway is intact  Chest: chest clear, no wheezing, rales, normal symmetric air entry  Heart: regular rate and rhythm ; heart sounds normal  Findings on exam of the body region where surgery is to be performed include: The left heel wound will not need any debridement or surgical intervention as there has been full or near full resolution of the bulla and associated inflammation. The dorsal 2nd digit still has a fibrous non-viable tissue in the center of the non-healing wound that will necessitate surgical intervention as planned.      Electronically signed by Tamy Tyler DPM on 3/10/2020 at 11:20 AM

## 2020-03-19 LAB
BUN BLDV-MCNC: 44 MG/DL
CALCIUM SERPL-MCNC: 8.8 MG/DL
CHLORIDE BLD-SCNC: 103 MMOL/L
CO2: 29 MMOL/L
CREAT SERPL-MCNC: 1.25 MG/DL
GFR CALCULATED: NORMAL
GLUCOSE BLD-MCNC: 159 MG/DL
POTASSIUM SERPL-SCNC: 4.7 MMOL/L
SODIUM BLD-SCNC: 141 MMOL/L

## 2020-03-23 ENCOUNTER — TELEPHONE (OUTPATIENT)
Dept: CARDIOLOGY CLINIC | Age: 85
End: 2020-03-23

## 2020-03-23 NOTE — TELEPHONE ENCOUNTER
Spoke with patient's daughter Salvador Looney regarding medication change and repeat blood work in 2 months. Order faxed to home care.

## 2020-04-22 ENCOUNTER — TELEPHONE (OUTPATIENT)
Dept: CARDIOLOGY CLINIC | Age: 85
End: 2020-04-22

## 2020-06-12 RX ORDER — FUROSEMIDE 40 MG/1
TABLET ORAL
Qty: 90 TABLET | Refills: 2 | Status: SHIPPED | OUTPATIENT
Start: 2020-06-12 | End: 2021-08-04

## 2020-06-18 ENCOUNTER — TELEPHONE (OUTPATIENT)
Dept: CARDIOLOGY CLINIC | Age: 85
End: 2020-06-18

## 2020-06-18 NOTE — TELEPHONE ENCOUNTER
Patient's daughter called requesting to speak with JOSE CARLOS Palacios regarding her dad's BP readings

## 2020-06-19 NOTE — TELEPHONE ENCOUNTER
I spoke w/ daughter  Low BP  Swelling about the same  Has severe pressure ulcer that may need surgical debridement  Norvasc stopped  Reduce pm dose of olmesartan in half

## 2020-09-17 ENCOUNTER — TELEPHONE (OUTPATIENT)
Dept: CARDIOLOGY CLINIC | Age: 85
End: 2020-09-17

## 2020-09-18 ENCOUNTER — NURSE ONLY (OUTPATIENT)
Dept: PRIMARY CARE CLINIC | Age: 85
End: 2020-09-18
Payer: MEDICARE

## 2020-09-18 PROCEDURE — 99211 OFF/OP EST MAY X REQ PHY/QHP: CPT | Performed by: NURSE PRACTITIONER

## 2020-09-20 LAB — SARS-COV-2, NAA: NOT DETECTED

## 2020-09-21 RX ORDER — FERROUS SULFATE 325(65) MG
325 TABLET ORAL EVERY OTHER DAY
COMMUNITY

## 2020-09-21 NOTE — PROGRESS NOTES
Preoperative Screening for Elective Surgery/Invasive Procedures While COVID-19 present in the community     Have you tested positive or have been told to self-isolate for COVID-19 like symptoms within the past 28 days? No   Do you currently have any of the following symptoms? No  o Fever >100.0 F or 99.9 F in immunocompromised patients? No  o New onset cough, shortness of breath or difficulty breathing? No  o New onset sore throat, myalgia (muscle aches and pains), headache, loss of taste/smell or diarrhea? No   Have you had a potential exposure to COVID-19 within the past 14 days by:  o Close contact with a confirmed case? No  o Close contact with a healthcare worker,  or essential infrastructure worker (grocery store, [employer], gas station, public utilities or transportation)? No  o Do you reside in a congregate setting such as; skilled nursing facility, adult home, correctional facility, homeless shelter or other institutional setting? Yes - assisted living  o Have you had recent travel to a known COVID-19 hotspot? No    Indicate if the patient has a positive screen by answering yes to one or more of the above questions. Patients who test positive or screen positive prior to surgery or on the day of surgery should be evaluated in conjunction with the surgeon/proceduralist/anesthesiologist to determine the urgency of the procedure.

## 2020-09-21 NOTE — PROGRESS NOTES
Obstructive Sleep Apnea (JUAN CARLOS) Screening     Patient:  Vin Story    YOB: 1935      Medical Record #:  [id number]                     Date:  9/21/2020     1. Are you a loud and/or regular snorer? [x]  Yes       [] No    2. Have you been observed to gasp or stop breathing during sleep? []  Yes       [x] No    3. Do you feel tired or groggy upon awakening or do you awaken with a headache?           []  Yes       [x] No    4. Are you often tired or fatigued during the wake time hours? [x]  Yes       [x] No    5. Do you fall asleep sitting, reading, watching TV or driving? []  Yes       [x] No    6. Do you often have problems with memory or concentration? []  Yes       [x] No    **If patient's score is ? 3 they are considered high risk for JUAN CARLOS. An Anesthesia provider will evaluate the patient and develop a plan of care the day of surgery. Note:  If the patient's BMI is more than 35 kg m¯² , has neck circumference > 40 cm, and/or high blood pressure the risk is greater (© American Sleep Apnea Association, 2006).

## 2020-09-22 ENCOUNTER — ANESTHESIA (OUTPATIENT)
Dept: ENDOSCOPY | Age: 85
End: 2020-09-22
Payer: MEDICARE

## 2020-09-22 ENCOUNTER — ANESTHESIA EVENT (OUTPATIENT)
Dept: ENDOSCOPY | Age: 85
End: 2020-09-22
Payer: MEDICARE

## 2020-09-22 ENCOUNTER — HOSPITAL ENCOUNTER (OUTPATIENT)
Age: 85
Setting detail: OUTPATIENT SURGERY
Discharge: HOME | End: 2020-09-22
Attending: INTERNAL MEDICINE | Admitting: INTERNAL MEDICINE
Payer: MEDICARE

## 2020-09-22 VITALS
SYSTOLIC BLOOD PRESSURE: 130 MMHG | RESPIRATION RATE: 20 BRPM | OXYGEN SATURATION: 100 % | DIASTOLIC BLOOD PRESSURE: 74 MMHG

## 2020-09-22 VITALS
RESPIRATION RATE: 18 BRPM | SYSTOLIC BLOOD PRESSURE: 127 MMHG | HEIGHT: 70 IN | BODY MASS INDEX: 28.63 KG/M2 | TEMPERATURE: 97.3 F | WEIGHT: 200 LBS | HEART RATE: 75 BPM | DIASTOLIC BLOOD PRESSURE: 68 MMHG | OXYGEN SATURATION: 99 %

## 2020-09-22 PROCEDURE — 2580000003 HC RX 258: Performed by: INTERNAL MEDICINE

## 2020-09-22 PROCEDURE — 88305 TISSUE EXAM BY PATHOLOGIST: CPT

## 2020-09-22 PROCEDURE — 7100000011 HC PHASE II RECOVERY - ADDTL 15 MIN: Performed by: INTERNAL MEDICINE

## 2020-09-22 PROCEDURE — 6360000002 HC RX W HCPCS: Performed by: NURSE ANESTHETIST, CERTIFIED REGISTERED

## 2020-09-22 PROCEDURE — 3700000000 HC ANESTHESIA ATTENDED CARE: Performed by: INTERNAL MEDICINE

## 2020-09-22 PROCEDURE — 7100000010 HC PHASE II RECOVERY - FIRST 15 MIN: Performed by: INTERNAL MEDICINE

## 2020-09-22 PROCEDURE — 2709999900 HC NON-CHARGEABLE SUPPLY: Performed by: INTERNAL MEDICINE

## 2020-09-22 PROCEDURE — 2500000003 HC RX 250 WO HCPCS: Performed by: NURSE ANESTHETIST, CERTIFIED REGISTERED

## 2020-09-22 PROCEDURE — 3609012400 HC EGD TRANSORAL BIOPSY SINGLE/MULTIPLE: Performed by: INTERNAL MEDICINE

## 2020-09-22 RX ORDER — PROPOFOL 10 MG/ML
INJECTION, EMULSION INTRAVENOUS PRN
Status: DISCONTINUED | OUTPATIENT
Start: 2020-09-22 | End: 2020-09-22 | Stop reason: SDUPTHER

## 2020-09-22 RX ORDER — LIDOCAINE HYDROCHLORIDE 10 MG/ML
0.1 INJECTION, SOLUTION EPIDURAL; INFILTRATION; INTRACAUDAL; PERINEURAL ONCE
Status: DISCONTINUED | OUTPATIENT
Start: 2020-09-22 | End: 2020-09-22 | Stop reason: HOSPADM

## 2020-09-22 RX ORDER — PROMETHAZINE HYDROCHLORIDE 25 MG/ML
6.25 INJECTION, SOLUTION INTRAMUSCULAR; INTRAVENOUS
Status: DISCONTINUED | OUTPATIENT
Start: 2020-09-22 | End: 2020-09-22 | Stop reason: HOSPADM

## 2020-09-22 RX ORDER — MORPHINE SULFATE 2 MG/ML
2 INJECTION, SOLUTION INTRAMUSCULAR; INTRAVENOUS EVERY 5 MIN PRN
Status: DISCONTINUED | OUTPATIENT
Start: 2020-09-22 | End: 2020-09-22 | Stop reason: HOSPADM

## 2020-09-22 RX ORDER — DIPHENHYDRAMINE HYDROCHLORIDE 50 MG/ML
12.5 INJECTION INTRAMUSCULAR; INTRAVENOUS
Status: DISCONTINUED | OUTPATIENT
Start: 2020-09-22 | End: 2020-09-22 | Stop reason: HOSPADM

## 2020-09-22 RX ORDER — MEPERIDINE HYDROCHLORIDE 50 MG/ML
12.5 INJECTION INTRAMUSCULAR; INTRAVENOUS; SUBCUTANEOUS EVERY 5 MIN PRN
Status: DISCONTINUED | OUTPATIENT
Start: 2020-09-22 | End: 2020-09-22 | Stop reason: HOSPADM

## 2020-09-22 RX ORDER — MORPHINE SULFATE 2 MG/ML
1 INJECTION, SOLUTION INTRAMUSCULAR; INTRAVENOUS EVERY 5 MIN PRN
Status: DISCONTINUED | OUTPATIENT
Start: 2020-09-22 | End: 2020-09-22 | Stop reason: HOSPADM

## 2020-09-22 RX ORDER — ONDANSETRON 2 MG/ML
4 INJECTION INTRAMUSCULAR; INTRAVENOUS PRN
Status: DISCONTINUED | OUTPATIENT
Start: 2020-09-22 | End: 2020-09-22 | Stop reason: HOSPADM

## 2020-09-22 RX ORDER — LABETALOL HYDROCHLORIDE 5 MG/ML
5 INJECTION, SOLUTION INTRAVENOUS EVERY 10 MIN PRN
Status: DISCONTINUED | OUTPATIENT
Start: 2020-09-22 | End: 2020-09-22 | Stop reason: HOSPADM

## 2020-09-22 RX ORDER — HYDRALAZINE HYDROCHLORIDE 20 MG/ML
5 INJECTION INTRAMUSCULAR; INTRAVENOUS EVERY 10 MIN PRN
Status: DISCONTINUED | OUTPATIENT
Start: 2020-09-22 | End: 2020-09-22 | Stop reason: HOSPADM

## 2020-09-22 RX ORDER — OXYCODONE HYDROCHLORIDE AND ACETAMINOPHEN 5; 325 MG/1; MG/1
1 TABLET ORAL PRN
Status: DISCONTINUED | OUTPATIENT
Start: 2020-09-22 | End: 2020-09-22 | Stop reason: HOSPADM

## 2020-09-22 RX ORDER — FENTANYL CITRATE 50 UG/ML
INJECTION, SOLUTION INTRAMUSCULAR; INTRAVENOUS PRN
Status: DISCONTINUED | OUTPATIENT
Start: 2020-09-22 | End: 2020-09-22 | Stop reason: SDUPTHER

## 2020-09-22 RX ORDER — LIDOCAINE HYDROCHLORIDE 20 MG/ML
INJECTION, SOLUTION INFILTRATION; PERINEURAL PRN
Status: DISCONTINUED | OUTPATIENT
Start: 2020-09-22 | End: 2020-09-22 | Stop reason: SDUPTHER

## 2020-09-22 RX ORDER — OXYCODONE HYDROCHLORIDE AND ACETAMINOPHEN 5; 325 MG/1; MG/1
2 TABLET ORAL PRN
Status: DISCONTINUED | OUTPATIENT
Start: 2020-09-22 | End: 2020-09-22 | Stop reason: HOSPADM

## 2020-09-22 RX ORDER — SODIUM CHLORIDE, SODIUM LACTATE, POTASSIUM CHLORIDE, CALCIUM CHLORIDE 600; 310; 30; 20 MG/100ML; MG/100ML; MG/100ML; MG/100ML
INJECTION, SOLUTION INTRAVENOUS CONTINUOUS
Status: DISCONTINUED | OUTPATIENT
Start: 2020-09-22 | End: 2020-09-22 | Stop reason: HOSPADM

## 2020-09-22 RX ORDER — ONDANSETRON 2 MG/ML
INJECTION INTRAMUSCULAR; INTRAVENOUS PRN
Status: DISCONTINUED | OUTPATIENT
Start: 2020-09-22 | End: 2020-09-22 | Stop reason: SDUPTHER

## 2020-09-22 RX ADMIN — ONDANSETRON 4 MG: 2 INJECTION INTRAMUSCULAR; INTRAVENOUS at 14:28

## 2020-09-22 RX ADMIN — FENTANYL CITRATE 25 MCG: 50 INJECTION INTRAMUSCULAR; INTRAVENOUS at 14:23

## 2020-09-22 RX ADMIN — FENTANYL CITRATE 25 MCG: 50 INJECTION INTRAMUSCULAR; INTRAVENOUS at 14:27

## 2020-09-22 RX ADMIN — LIDOCAINE HYDROCHLORIDE 3 ML: 20 INJECTION, SOLUTION INFILTRATION; PERINEURAL at 14:23

## 2020-09-22 RX ADMIN — PROPOFOL 100 MG: 10 INJECTION, EMULSION INTRAVENOUS at 14:23

## 2020-09-22 RX ADMIN — SODIUM CHLORIDE, POTASSIUM CHLORIDE, SODIUM LACTATE AND CALCIUM CHLORIDE: 600; 310; 30; 20 INJECTION, SOLUTION INTRAVENOUS at 14:06

## 2020-09-22 RX ADMIN — SODIUM CHLORIDE, POTASSIUM CHLORIDE, SODIUM LACTATE AND CALCIUM CHLORIDE: 600; 310; 30; 20 INJECTION, SOLUTION INTRAVENOUS at 13:53

## 2020-09-22 RX ADMIN — FENTANYL CITRATE 25 MCG: 50 INJECTION INTRAMUSCULAR; INTRAVENOUS at 14:20

## 2020-09-22 ASSESSMENT — PAIN SCALES - GENERAL
PAINLEVEL_OUTOF10: 0
PAINLEVEL_OUTOF10: 0

## 2020-09-22 ASSESSMENT — PAIN - FUNCTIONAL ASSESSMENT: PAIN_FUNCTIONAL_ASSESSMENT: 0-10

## 2020-09-22 NOTE — ANESTHESIA POSTPROCEDURE EVALUATION
Department of Anesthesiology  Postprocedure Note    Patient: Slim Lyn  MRN: [id number]  YOB: 1935  Date of evaluation: 9/22/2020  Time:  3:12 PM     Procedure Summary     Date:  09/22/20 Room / Location:  [facility] / [facility]    Anesthesia Start:  5914 Anesthesia Stop:  1389    Procedure:  EGD BIOPSY (N/A ) Diagnosis:  (coffee ground emesis, pt is paraplegic)    Surgeon:  Darvin Scheuermann, MD Responsible Provider:  Justin Pink MD    Anesthesia Type:  MAC ASA Status:  3          Anesthesia Type: MAC    Radha Phase I: Radha Score: 10    Radha Phase II: Radha Score: 9    Last vitals: Reviewed and per EMR flowsheets.        Anesthesia Post Evaluation    Comments: Postoperative Anesthesia Note    Name:    Slim Lyn  MRN:      [id number]    Patient Vitals in the past 12 hrs:  09/22/20 1450, BP:110/66, Pulse:80, Resp:18, SpO2:98 %  09/22/20 1440, BP:(!) 139/105, Pulse:84, Resp:16, SpO2:96 %  09/22/20 1352, BP:(!) 141/80, Temp:97.3 °F (36.3 °C), Temp src:Temporal, Pulse:88, Resp:18, SpO2:96 %     LABS:    CBC  Lab Results       Component                Value               Date/Time                  WBC                      6.3                 01/04/2020 05:08 AM        HGB                      12.5 (L)            01/04/2020 05:08 AM        HCT                      37.0 (L)            01/04/2020 05:08 AM        PLT                      216                 01/04/2020 05:08 AM   RENAL  Lab Results       Component                Value               Date/Time                  NA                       141                 03/19/2020                 K                        4.7                 03/19/2020                 K                        3.6                 01/04/2020 05:08 AM        CL                       103                 03/19/2020                 CO2                      29                  03/19/2020                 BUN                      44

## 2020-09-22 NOTE — H&P
History and Physical / Pre-Sedation Assessment    Patient:  Victoria Alpers   :   1935     Intended Procedure:  EGD    HPI: anemia   hematemesis    Nurses notes reviewed and agreed. Current Facility-Administered Medications   Medication Dose Route Frequency Provider Last Rate Last Dose    lactated ringers infusion   Intravenous Continuous Merrill Sauceda MD        lidocaine PF 1 % injection 0.1 mL  0.1 mL Intradermal Once Merrill Sauceda MD         No current facility-administered medications on file prior to encounter. Current Outpatient Medications on File Prior to Encounter   Medication Sig Dispense Refill    apixaban (ELIQUIS) 5 MG TABS tablet Take by mouth 2 times daily      ferrous sulfate (IRON 325) 325 (65 Fe) MG tablet Take 325 mg by mouth daily (with breakfast)      furosemide (LASIX) 40 MG tablet TAKE ONE TABLET BY MOUTH DAILY 90 tablet 2    potassium chloride (KLOR-CON M) 10 MEQ extended release tablet Take 1 tablet by mouth daily 90 tablet 3    acetaminophen (TYLENOL) 325 MG tablet Take 325 mg by mouth 2 times daily       lovastatin (MEVACOR) 40 MG tablet Take 40 mg by mouth nightly      alendronate (FOSAMAX) 70 MG tablet Take 70 mg by mouth every 7 days      olmesartan-hydrochlorothiazide (BENICAR HCT) 20-12.5 MG per tablet Take 1 tablet by mouth 2 times daily (Patient taking differently: Take 1 tablet by mouth as needed Only needed for elevated BP.  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    Prostate cancer ([facility] [address]     Wears glasses      Past Surgical History:   Procedure Laterality Date    ABOVE KNEE AMPUTATION Right 07/2020    BACK SURGERY      for spinal cord aneurysm rupture    CARDIAC SURGERY      quadruple bypass    FOOT DEBRIDEMENT Left 3/10/2020    LEFT FOOT- SHARP EXCISIONAL DEBRIDEMENT DOWN TO, THROUGH AND INCLUDING THE LAYERS OF THE DEEP FASCIA AND TENDON, SECOND DIGIT, POSSIBLE SAUCERIZATION BONE PROXIMAL PHALANX, APPLICATION SKIN GRAFT SUBSTITUTE LEFT FOOT performed by Shahab Leo DPM at [facility] OR       Allergies: No Known Allergies    Physical Exam:  Vital Signs: BP (!) 141/80   Pulse 88   Temp 97.3 °F (36.3 °C) (Temporal)   Resp 18   Ht 5' 10\" (1.778 m)   Wt 200 lb (90.7 kg)   SpO2 96%   BMI 28.70 kg/m²    Airway: Mallampati: II (soft palate, uvula, fauces visible)  Pulmonary:Normal  Cardiac:Normal  Abdomen:Normal    Pre-Procedure Assessment / Plan:  ASA: Class 3 - A patient with severe systemic disease that limits activity but is not incapacitating  Level of Sedation Plan: per anesthesia  Post Procedure plan: Return to same level of care    I assessed the patient and find that the patient is in satisfactory condition to proceed with the planned procedure and sedation plan. I have explained the risk, benefits, and alternatives to the procedure; the patient understands and agrees to proceed.        Murray Melendez  9/22/2020

## 2020-09-22 NOTE — ANESTHESIA PRE PROCEDURE
Department of Anesthesiology  Preprocedure Note       Name:  Cesar Chamberlain   Age:  82 y.o.  :  1935                                          MRN:  [id number]         Date:  2020      Surgeon: Reggie Pollard):  Gala Hodgkins, MD    Procedure: Procedure(s):  EGD DIAGNOSTIC With Anesthesia    Medications prior to admission:   Prior to Admission medications    Medication Sig Start Date End Date Taking? Authorizing Provider   apixaban (ELIQUIS) 5 MG TABS tablet Take by mouth 2 times daily   Yes Historical Provider, MD   ferrous sulfate (IRON 325) 325 (65 Fe) MG tablet Take 325 mg by mouth daily (with breakfast)   Yes Historical Provider, MD   furosemide (LASIX) 40 MG tablet TAKE ONE TABLET BY MOUTH DAILY 20  Yes Donna Alfred MD   potassium chloride (KLOR-CON M) 10 MEQ extended release tablet Take 1 tablet by mouth daily 20  Yes Bandar Forte MD   acetaminophen (TYLENOL) 325 MG tablet Take 325 mg by mouth 2 times daily    Yes Historical Provider, MD   lovastatin (MEVACOR) 40 MG tablet Take 40 mg by mouth nightly   Yes Historical Provider, MD   alendronate (FOSAMAX) 70 MG tablet Take 70 mg by mouth every 7 days   Yes Historical Provider, MD   olmesartan-hydrochlorothiazide (BENICAR HCT) 20-12.5 MG per tablet Take 1 tablet by mouth 2 times daily  Patient taking differently: Take 1 tablet by mouth as needed Only needed for elevated BP.  BP checked daily 19  Yes Bandar Forte MD   aspirin 81 MG tablet Take 81 mg by mouth daily   Yes Historical Provider, MD   albuterol sulfate  (90 BASE) MCG/ACT inhaler Inhale 2 puffs into the lungs as needed for Wheezing    Historical Provider, MD       Current medications:    Current Facility-Administered Medications   Medication Dose Route Frequency Provider Last Rate Last Dose    lactated ringers infusion   Intravenous Continuous Gala Hodgkins, MD 50 mL/hr at 20 1406      lidocaine PF 1 % injection 0.1 mL  0.1 mL Intradermal SpO2:  96%   Weight: 200 lb (90.7 kg)    Height: 5' 10\" (1.778 m)                                               BP Readings from Last 3 Encounters:   09/22/20 (!) 141/80   03/10/20 (!) 106/55   01/28/20 122/72       NPO Status: Time of last liquid consumption: 2100                        Time of last solid consumption: 1800                        Date of last liquid consumption: 09/14/20                        Date of last solid food consumption: 09/21/20    BMI:   Wt Readings from Last 3 Encounters:   09/21/20 200 lb (90.7 kg)   03/10/20 230 lb (104.3 kg)   01/28/20 230 lb (104.3 kg)     Body mass index is 28.7 kg/m². CBC:   Lab Results   Component Value Date    WBC 6.3 01/04/2020    RBC 3.77 01/04/2020    HGB 12.5 01/04/2020    HCT 37.0 01/04/2020    MCV 98.0 01/04/2020    RDW 14.2 01/04/2020     01/04/2020       CMP:   Lab Results   Component Value Date     03/19/2020    K 4.7 03/19/2020    K 3.6 01/04/2020     03/19/2020    CO2 29 03/19/2020    BUN 44 03/19/2020    CREATININE 1.25 03/19/2020    GFRAA 74 03/02/2020    AGRATIO 1.4 12/31/2019    LABGLOM 64 03/02/2020    GLUCOSE 159 03/19/2020    PROT 6.1 12/31/2019    CALCIUM 8.8 03/19/2020    BILITOT 1.1 12/31/2019    ALKPHOS 54 12/31/2019    AST 15 12/31/2019    ALT 12 12/31/2019       POC Tests: No results for input(s): POCGLU, POCNA, POCK, POCCL, POCBUN, POCHEMO, POCHCT in the last 72 hours.     Coags: No results found for: PROTIME, INR, APTT    HCG (If Applicable): No results found for: PREGTESTUR, PREGSERUM, HCG, HCGQUANT     ABGs: No results found for: PHART, PO2ART, SSN4MRW, LIE7XNA, BEART, Z8SUTCDX     Type & Screen (If Applicable):  No results found for: LABABO, LABRH    Drug/Infectious Status (If Applicable):  No results found for: HIV, HEPCAB    COVID-19 Screening (If Applicable):   Lab Results   Component Value Date    COVID19 NOT DETECTED 09/18/2020         Anesthesia Evaluation  Patient summary reviewed no history of anesthetic complications:   Airway: Mallampati: II  TM distance: >3 FB   Neck ROM: full  Mouth opening: > = 3 FB Dental: normal exam         Pulmonary:Negative Pulmonary ROS and normal exam  breath sounds clear to auscultation                             Cardiovascular:Negative CV ROS  Exercise tolerance: no interval change,   (+) hypertension:, CAD:,         Rhythm: regular  Rate: normal                    Neuro/Psych:   Negative Neuro/Psych ROS              GI/Hepatic/Renal: Neg GI/Hepatic/Renal ROS            Endo/Other: Negative Endo/Other ROS                    Abdominal:           Vascular: negative vascular ROS. Anesthesia Plan      MAC     ASA 3     (I discussed with the patient the risks and benefits of PIV, anesthesia, IV Narcotics, PACU. All questions were answered the patient agrees with the plan and wishes to proceed)  Induction: intravenous. Pre-Operative Diagnosis: coffee ground emesis, pt is paraplegic    82 y.o.   BMI:  Body mass index is 28.7 kg/m².      Vitals:    20 1125 20 1352   BP:  (!) 141/80   Pulse:  88   Resp:  18   Temp:  97.3 °F (36.3 °C)   TempSrc:  Temporal   SpO2:  96%   Weight: 200 lb (90.7 kg)    Height: 5' 10\" (1.778 m)        No Known Allergies    Social History     Tobacco Use    Smoking status: Former Smoker     Last attempt to quit: 3/9/1976     Years since quittin.5    Smokeless tobacco: Never Used   Substance Use Topics    Alcohol use: Not Currently       LABS:    CBC  Lab Results   Component Value Date/Time    WBC 6.3 2020 05:08 AM    HGB 12.5 (L) 2020 05:08 AM    HCT 37.0 (L) 2020 05:08 AM     2020 05:08 AM     RENAL  Lab Results   Component Value Date/Time     2020    K 4.7 2020    K 3.6 2020 05:08 AM     2020    CO2 29 2020    BUN 44 2020    CREATININE 1.25 2020    GLUCOSE 159 2020     COAGS  No results found for: PROTIME, INR, APTT        Yony Burton MD   9/22/2020

## 2020-09-23 NOTE — OP NOTE
[address] [facility]                 Morena Mccurdy                                OPERATIVE REPORT    PATIENT NAME: Coyn Yadav                  :        1935  MED REC NO:   [id number]                          ROOM:  ACCOUNT NO:   [de-identified]                           ADMIT DATE: 2020  PROVIDER:     Liliana Alexandre MD    DATE OF PROCEDURE:  2020    PREPROCEDURE DIAGNOSES:  Severe anemia, coffee-ground emesis. POSTPROCEDURE DIAGNOSES:  Mild duodenitis, no evidence of Beckham's, no  source for anemia, biopsies pending. SURGEON:  Liliana Alexandre MD    INDICATIONS:  The patient is an 82year-old paraplegic, has severe  anemia with hemoglobin of 6, reportedly coffee-ground emesis, although  today he denies it. No evidence of dysphagia. Denies melena. EGD is  being performed. Consent was obtained. OPERATIVE PROCEDURE:  The patient was sedated per Anesthesia. The  Olympus video endoscope was passed under direct vision into the  esophagus. Esophagus was normal to 40 cm. There was no evidence of  Beckham's. Stomach was visualized both on antegrade and retrograde  views, was normal.  Pylorus was patent. Duodenum showed some mild  erythema of doubtful significance. I did do biopsies in the small bowel  to evaluate for celiac, also biopsies in the stomach to evaluate for H.  pylori, which can be associated with iron deficiency. He tolerated the  procedure well. IMPRESSION:  1. No evidence of Beckham's. 2.  No definite source for severe anemia. If biopsies are negative, I  would recommend colonoscopy, which he did refuse at this time as we had  planned to do a double procedure. Blood loss was less than 5 mL. Lorri Kwok MD    D: 2020 14:45:56       T: 2020 1:36:36     [email or web address]  Job#: [id number]     Doc#: [id number]    CC:  MD Monroe Garrido MD Theresia Battiest, MD

## 2020-09-28 NOTE — TELEPHONE ENCOUNTER
Refill request for potassium chloride Delma JONES) [address] [facility], [address]

## 2020-09-29 RX ORDER — POTASSIUM CHLORIDE 750 MG/1
10 TABLET, EXTENDED RELEASE ORAL DAILY
Qty: 90 TABLET | Refills: 3 | Status: SHIPPED | OUTPATIENT
Start: 2020-09-29 | End: 2021-06-14 | Stop reason: SDUPTHER

## 2020-10-01 ENCOUNTER — TELEPHONE (OUTPATIENT)
Dept: CARDIOLOGY CLINIC | Age: 85
End: 2020-10-01

## 2020-10-01 NOTE — TELEPHONE ENCOUNTER
Message left with Daughter Sherrie that 83 [address] would like to keep potassium at 10 meq daily.

## 2021-01-13 RX ORDER — OLMESARTAN MEDOXOMIL AND HYDROCHLOROTHIAZIDE 20/12.5 20; 12.5 MG/1; MG/1
1 TABLET ORAL DAILY
Qty: 90 TABLET | Refills: 3 | Status: ON HOLD | OUTPATIENT
Start: 2021-01-13 | End: 2022-08-02

## 2021-04-24 ENCOUNTER — HOSPITAL ENCOUNTER (EMERGENCY)
Age: 86
Discharge: HOME | End: 2021-04-25
Attending: STUDENT IN AN ORGANIZED HEALTH CARE EDUCATION/TRAINING PROGRAM
Payer: MEDICARE

## 2021-04-24 DIAGNOSIS — T83.9XXA PROBLEM WITH FOLEY CATHETER, INITIAL ENCOUNTER (HCC): ICD-10-CM

## 2021-04-24 DIAGNOSIS — R33.9 URINARY RETENTION: Primary | ICD-10-CM

## 2021-04-24 DIAGNOSIS — R31.9 HEMATURIA, UNSPECIFIED TYPE: ICD-10-CM

## 2021-04-24 LAB
ANION GAP SERPL CALCULATED.3IONS-SCNC: 14 MMOL/L (ref 3–16)
BASOPHILS ABSOLUTE: 0.1 K/UL (ref 0–0.2)
BASOPHILS RELATIVE PERCENT: 0.8 %
BUN BLDV-MCNC: 17 MG/DL (ref 7–20)
CALCIUM SERPL-MCNC: 9.1 MG/DL (ref 8.3–10.6)
CHLORIDE BLD-SCNC: 102 MMOL/L (ref 99–110)
CO2: 22 MMOL/L (ref 21–32)
CREAT SERPL-MCNC: 0.7 MG/DL (ref 0.8–1.3)
EOSINOPHILS ABSOLUTE: 0.4 K/UL (ref 0–0.6)
EOSINOPHILS RELATIVE PERCENT: 3.6 %
GFR AFRICAN AMERICAN: >60
GFR NON-AFRICAN AMERICAN: >60
GLUCOSE BLD-MCNC: 87 MG/DL (ref 70–99)
HCT VFR BLD CALC: 35.8 % (ref 40.5–52.5)
HEMOGLOBIN: 11.6 G/DL (ref 13.5–17.5)
INR BLD: 1.09 (ref 0.86–1.14)
LYMPHOCYTES ABSOLUTE: 1.8 K/UL (ref 1–5.1)
LYMPHOCYTES RELATIVE PERCENT: 14.4 %
MCH RBC QN AUTO: 30.6 PG (ref 26–34)
MCHC RBC AUTO-ENTMCNC: 32.4 G/DL (ref 31–36)
MCV RBC AUTO: 94.5 FL (ref 80–100)
MONOCYTES ABSOLUTE: 0.8 K/UL (ref 0–1.3)
MONOCYTES RELATIVE PERCENT: 6.5 %
NEUTROPHILS ABSOLUTE: 9.4 K/UL (ref 1.7–7.7)
NEUTROPHILS RELATIVE PERCENT: 74.7 %
PDW BLD-RTO: 16.7 % (ref 12.4–15.4)
PLATELET # BLD: 292 K/UL (ref 135–450)
PMV BLD AUTO: 7.7 FL (ref 5–10.5)
POTASSIUM REFLEX MAGNESIUM: 5 MMOL/L (ref 3.5–5.1)
PROTHROMBIN TIME: 12.7 SEC (ref 10–13.2)
RBC # BLD: 3.79 M/UL (ref 4.2–5.9)
SODIUM BLD-SCNC: 138 MMOL/L (ref 136–145)
TROPONIN: 0.02 NG/ML
WBC # BLD: 12.6 K/UL (ref 4–11)

## 2021-04-24 PROCEDURE — 85610 PROTHROMBIN TIME: CPT

## 2021-04-24 PROCEDURE — 6360000002 HC RX W HCPCS: Performed by: STUDENT IN AN ORGANIZED HEALTH CARE EDUCATION/TRAINING PROGRAM

## 2021-04-24 PROCEDURE — 93005 ELECTROCARDIOGRAM TRACING: CPT | Performed by: STUDENT IN AN ORGANIZED HEALTH CARE EDUCATION/TRAINING PROGRAM

## 2021-04-24 PROCEDURE — 84484 ASSAY OF TROPONIN QUANT: CPT

## 2021-04-24 PROCEDURE — 6370000000 HC RX 637 (ALT 250 FOR IP): Performed by: STUDENT IN AN ORGANIZED HEALTH CARE EDUCATION/TRAINING PROGRAM

## 2021-04-24 PROCEDURE — 51702 INSERT TEMP BLADDER CATH: CPT

## 2021-04-24 PROCEDURE — 36415 COLL VENOUS BLD VENIPUNCTURE: CPT

## 2021-04-24 PROCEDURE — 6360000002 HC RX W HCPCS: Performed by: EMERGENCY MEDICINE

## 2021-04-24 PROCEDURE — 96365 THER/PROPH/DIAG IV INF INIT: CPT

## 2021-04-24 PROCEDURE — 2580000003 HC RX 258: Performed by: STUDENT IN AN ORGANIZED HEALTH CARE EDUCATION/TRAINING PROGRAM

## 2021-04-24 PROCEDURE — 80048 BASIC METABOLIC PNL TOTAL CA: CPT

## 2021-04-24 PROCEDURE — 96374 THER/PROPH/DIAG INJ IV PUSH: CPT

## 2021-04-24 PROCEDURE — 99284 EMERGENCY DEPT VISIT MOD MDM: CPT

## 2021-04-24 PROCEDURE — 85025 COMPLETE CBC W/AUTO DIFF WBC: CPT

## 2021-04-24 RX ORDER — ONDANSETRON 2 MG/ML
4 INJECTION INTRAMUSCULAR; INTRAVENOUS ONCE
Status: COMPLETED | OUTPATIENT
Start: 2021-04-25 | End: 2021-04-24

## 2021-04-24 RX ADMIN — LIDOCAINE HYDROCHLORIDE: 20 SOLUTION ORAL; TOPICAL at 20:39

## 2021-04-24 RX ADMIN — ONDANSETRON 4 MG: 2 INJECTION INTRAMUSCULAR; INTRAVENOUS at 23:54

## 2021-04-24 RX ADMIN — DEXTROSE MONOHYDRATE 1000 MG: 5 INJECTION INTRAVENOUS at 22:03

## 2021-04-24 ASSESSMENT — ENCOUNTER SYMPTOMS
NAUSEA: 0
SHORTNESS OF BREATH: 0
EYE REDNESS: 0
ABDOMINAL PAIN: 0
VOMITING: 0
RHINORRHEA: 0
COLOR CHANGE: 0
SORE THROAT: 0
COUGH: 0
EYE DISCHARGE: 0

## 2021-04-24 NOTE — ED NOTES
Bed: B22-22  Expected date:   Expected time:   Means of arrival:   Comments:  [address], RN  04/24/21 1335

## 2021-04-25 VITALS
BODY MASS INDEX: 30.78 KG/M2 | HEIGHT: 70 IN | OXYGEN SATURATION: 93 % | RESPIRATION RATE: 18 BRPM | TEMPERATURE: 98.2 F | SYSTOLIC BLOOD PRESSURE: 90 MMHG | WEIGHT: 215 LBS | HEART RATE: 95 BPM | DIASTOLIC BLOOD PRESSURE: 53 MMHG

## 2021-04-25 NOTE — ED PROVIDER NOTES
[address] [facility] [address] ENCOUNTER          ATTENDING PHYSICIAN NOTE       Date of evaluation: 4/24/2021    ADDENDUM:      Care of this patient was assumed from Dr. Aurora Medeiros. The patient was seen for Hematuria (Bright red blood in fitzgerald catheter)  . The patient's initial evaluation and plan have been discussed with the prior provider who initially evaluated the patient. Nursing Notes, Past Medical Hx, Past Surgical Hx, Social Hx, Allergies, and Family Hx were all reviewed.     Diagnostic Results       RADIOLOGY:  No orders to display       LABS:   Results for orders placed or performed during the hospital encounter of 04/24/21   CBC Auto Differential   Result Value Ref Range    WBC 12.6 (H) 4.0 - 11.0 K/uL    RBC 3.79 (L) 4.20 - 5.90 M/uL    Hemoglobin 11.6 (L) 13.5 - 17.5 g/dL    Hematocrit 35.8 (L) 40.5 - 52.5 %    MCV 94.5 80.0 - 100.0 fL    MCH 30.6 26.0 - 34.0 pg    MCHC 32.4 31.0 - 36.0 g/dL    RDW 16.7 (H) 12.4 - 15.4 %    Platelets 095 269 - 240 K/uL    MPV 7.7 5.0 - 10.5 fL    Neutrophils % 74.7 %    Lymphocytes % 14.4 %    Monocytes % 6.5 %    Eosinophils % 3.6 %    Basophils % 0.8 %    Neutrophils Absolute 9.4 (H) 1.7 - 7.7 K/uL    Lymphocytes Absolute 1.8 1.0 - 5.1 K/uL    Monocytes Absolute 0.8 0.0 - 1.3 K/uL    Eosinophils Absolute 0.4 0.0 - 0.6 K/uL    Basophils Absolute 0.1 0.0 - 0.2 K/uL   Basic Metabolic Panel w/ Reflex to MG   Result Value Ref Range    Sodium 138 136 - 145 mmol/L    Potassium reflex Magnesium 5.0 3.5 - 5.1 mmol/L    Chloride 102 99 - 110 mmol/L    CO2 22 21 - 32 mmol/L    Anion Gap 14 3 - 16    Glucose 87 70 - 99 mg/dL    BUN 17 7 - 20 mg/dL    CREATININE 0.7 (L) 0.8 - 1.3 mg/dL    GFR Non-African American >60 >60    GFR African American >60 >60    Calcium 9.1 8.3 - 10.6 mg/dL   Protime-INR   Result Value Ref Range    Protime 12.7 10.0 - 13.2 sec    INR 1.09 0.86 - 1.14   Troponin   Result Value Ref Range    Troponin 0.02 (H) <0.01 ng/mL       RECENT VITALS:  BP: (!) 90/53, Temp: 98.2 °F (36.8 °C), Pulse: 95, Resp: 18, SpO2: 93 %     Procedures       ED Course     The patient was given the following medications:  Orders Placed This Encounter   Medications    aluminum & magnesium hydroxide-simethicone (MAALOX) 30 mL, lidocaine viscous hcl (XYLOCAINE) 5 mL (GI COCKTAIL)    cefTRIAXone (ROCEPHIN) 1000 mg IVPB in 50 mL D5W minibag     Order Specific Question:   Antimicrobial Indications     Answer:   Surgical Prophylaxis    ondansetron (ZOFRAN) injection 4 mg       CONSULTS:  Princess / ASSESSMENT / Sonam Jonathon is a 82 y.o. male with a history of paraplegia and other medical comorbidities with a chronic indwelling Dueñas catheter, who presents for urinary retention and hematuria, with concern for urethral injury. Please see Dr. Junie Jones initial dictation for details of the patient's history, physical examination, and initial emergency department course. The patient's care was given over to me at 2200 with results of urology consultation pending. Dr. Rashel Zhou of [facility] did present to the emergency department and placed a Dueñas catheter successfully, with return of greater than a liter of urine, and irrigation of clot from the bladder thereafter. He was then felt to be stable for discharge with outpatient urology follow-up. Clinical Impression     1. Urinary retention    2. Hematuria, unspecified type    3.  Problem with Dueñas catheter, initial encounter [facility])        Disposition     PATIENT REFERRED TO:  Andrea Delgadillo MD  [address] [place] [address]  [phone]    Schedule an appointment as soon as possible for a visit in 2 weeks  Office is [place] [address] in [facility]:  Discharge Medication List as of 4/25/2021  1:31 AM          DISPOSITION Decision To Discharge 04/25/2021 01:30:44 AM       Benita Ordonez MD  05/07/21 0547

## 2021-04-25 NOTE — ED PROVIDER NOTES
[address] [facility]          ATTENDING PHYSICIAN NOTE       Date of evaluation: 4/24/2021    Chief Complaint     Hematuria (Bright red blood in fitzgerald catheter)      History of Present Illness     Braydon De Leon is a 82 y.o. male with hx of paraplegia, CAD, HTN, HLD, and chronic indwelling fitzgerald who presents for hematuria and urinary retention. Patient was getting his normal monthly catheter exchange this morning. Family reports that the nurse replaced the catheter, but did not get return of urine before she inflated the balloon. After she left, they noticed gross bleeding in his fitzgerald bag. They called another home health nurse who replaced the catheter, but was still unable to get return of urine. He continued to have bleeding into his bag, so they came to the ED. He is currently not taking any blood thinners. Review of Systems     Review of Systems   Constitutional: Negative for chills and fever. HENT: Negative for rhinorrhea and sore throat. Eyes: Negative for discharge and redness. Respiratory: Negative for cough and shortness of breath. Cardiovascular: Negative for chest pain and leg swelling. Gastrointestinal: Negative for abdominal pain, nausea and vomiting. Genitourinary: Positive for difficulty urinating, discharge (blood) and hematuria. Musculoskeletal: Negative for arthralgias and myalgias. Skin: Negative for color change and rash. Neurological: Negative for light-headedness and headaches. Psychiatric/Behavioral: Negative for agitation and confusion. Past Medical, Surgical, Family, and Social History     He has a past medical history of Arthritis, CAD (coronary artery disease), History of blood transfusion, Hx of blood clots, Hyperlipidemia, Hypertension, Paraplegic spinal paralysis ([address] Prostate cancer ([address] and Wears glasses. 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 His family history includes Alzheimer's Disease in his mother; Heart Disease in his father. He reports that he quit smoking about 45 years ago. He has never used smokeless tobacco. He reports previous alcohol use. He reports that he does not use drugs. Medications     Previous Medications    ACETAMINOPHEN (TYLENOL) 325 MG TABLET    Take 325 mg by mouth 2 times daily     ALBUTEROL SULFATE  (90 BASE) MCG/ACT INHALER    Inhale 2 puffs into the lungs as needed for Wheezing    ALENDRONATE (FOSAMAX) 70 MG TABLET    Take 70 mg by mouth every 7 days    APIXABAN (ELIQUIS) 5 MG TABS TABLET    Take by mouth 2 times daily    ASPIRIN 81 MG TABLET    Take 81 mg by mouth daily    FERROUS SULFATE (IRON 325) 325 (65 FE) MG TABLET    Take 325 mg by mouth daily (with breakfast)    FUROSEMIDE (LASIX) 40 MG TABLET    TAKE ONE TABLET BY MOUTH DAILY    LOVASTATIN (MEVACOR) 40 MG TABLET    Take 40 mg by mouth nightly    OLMESARTAN-HYDROCHLOROTHIAZIDE (BENICAR HCT) 20-12.5 MG PER TABLET    Take 1 tablet by mouth daily    POTASSIUM CHLORIDE (KLOR-CON M) 10 MEQ EXTENDED RELEASE TABLET    Take 1 tablet by mouth daily       Allergies     He has No Known Allergies. Physical Exam     INITIAL VITALS: BP: 136/75, Temp: 98.2 °F (36.8 °C), Pulse: 80, Resp: 18, SpO2: 98 %   Physical Exam  Constitutional:       Appearance: Normal appearance. HENT:      Head: Normocephalic and atraumatic. Right Ear: External ear normal.      Left Ear: External ear normal.   Eyes:      Extraocular Movements: Extraocular movements intact. Pupils: Pupils are equal, round, and reactive to light. Neck:      Musculoskeletal: Normal range of motion and neck supple. Cardiovascular:      Rate and Rhythm: Normal rate. Pulmonary:      Effort: Pulmonary effort is normal. No respiratory distress. Abdominal:      General: There is distension. Palpations: Abdomen is soft. Tenderness: There is no abdominal tenderness. Genitourinary:     Penis: Uncircumcised. Comments: Blood at meatus  Musculoskeletal:         General: No swelling or deformity. Neurological:      General: No focal deficit present. Mental Status: He is alert and oriented to person, place, and time.    Psychiatric:         Mood and Affect: Mood normal.         Behavior: Behavior normal.         Diagnostic Results     EKG   NSR with RBBB, no acute ST changes, QTc not prolonged    RADIOLOGY:  No orders to display       LABS:   Results for orders placed or performed during the hospital encounter of 04/24/21   CBC Auto Differential   Result Value Ref Range    WBC 12.6 (H) 4.0 - 11.0 K/uL    RBC 3.79 (L) 4.20 - 5.90 M/uL    Hemoglobin 11.6 (L) 13.5 - 17.5 g/dL    Hematocrit 35.8 (L) 40.5 - 52.5 %    MCV 94.5 80.0 - 100.0 fL    MCH 30.6 26.0 - 34.0 pg    MCHC 32.4 31.0 - 36.0 g/dL    RDW 16.7 (H) 12.4 - 15.4 %    Platelets 696 851 - 325 K/uL    MPV 7.7 5.0 - 10.5 fL    Neutrophils % 74.7 %    Lymphocytes % 14.4 %    Monocytes % 6.5 %    Eosinophils % 3.6 %    Basophils % 0.8 %    Neutrophils Absolute 9.4 (H) 1.7 - 7.7 K/uL    Lymphocytes Absolute 1.8 1.0 - 5.1 K/uL    Monocytes Absolute 0.8 0.0 - 1.3 K/uL    Eosinophils Absolute 0.4 0.0 - 0.6 K/uL    Basophils Absolute 0.1 0.0 - 0.2 K/uL   Basic Metabolic Panel w/ Reflex to MG   Result Value Ref Range    Sodium 138 136 - 145 mmol/L    Potassium reflex Magnesium 5.0 3.5 - 5.1 mmol/L    Chloride 102 99 - 110 mmol/L    CO2 22 21 - 32 mmol/L    Anion Gap 14 3 - 16    Glucose 87 70 - 99 mg/dL    BUN 17 7 - 20 mg/dL    CREATININE 0.7 (L) 0.8 - 1.3 mg/dL    GFR Non-African American >60 >60    GFR African American >60 >60    Calcium 9.1 8.3 - 10.6 mg/dL   Protime-INR   Result Value Ref Range    Protime 12.7 10.0 - 13.2 sec    INR 1.09 0.86 - 1.14   Troponin   Result Value Ref Range    Troponin 0.02 (H) <0.01 ng/mL         RECENT VITALS:  BP: 112/63,Temp: 98.2 °F (36.8 °C), Pulse: 86, Resp: 13, SpO2: 95 %     ED Course     Nursing Notes, Past Medical Hx, Past Surgical Hx, Social Hx,Allergies, and Family Hx were reviewed. patient was given the following medications:  Orders Placed This Encounter   Medications    aluminum & magnesium hydroxide-simethicone (MAALOX) 30 mL, lidocaine viscous hcl (XYLOCAINE) 5 mL (GI COCKTAIL)    cefTRIAXone (ROCEPHIN) 1000 mg IVPB in 50 mL D5W minibag     Order Specific Question:   Antimicrobial Indications     Answer:   Surgical Prophylaxis       CONSULTS:  IP CONSULT TO UROLOGY    MEDICAL DECISIONMAKING / ASSESSMENT / Morenita Greyson is a 82 y.o. male with hx of paraplegia and chronic indwelling fitzgerald presents for hematuria and urinary retention. Bladder distended with gross hematuria in fitzgerald bag on arrival.  Unable to flush catheter, so attempted to replace fitzgerald. RN stopped when she met resistance. Patient had no return of urine. Given his hx, I was concerned for urethral injury and consulted urology. Labs returned with leukocytosis 12.6, Hgb 11.6, Cr 0.7. Dose of rocephin ordered. While in the ED, patient had episode of hypotension, likely related to autonomic dysfunction. Obtained EKG which showed no ischemic change. Troponin 0.02. Patient care handed off to Dr. Fernandez Knight awaiting urology arrival.  Anticipate admission. Clinical Impression     1. Urinary retention    2. Hematuria, unspecified type    3.  Problem with Fitzgerald catheter, initial encounter [facility])        Disposition     DISPOSITION    Anticipate admission     Eden Vila MD  04/25/21 0871

## 2021-04-25 NOTE — CONSULTS
04/24/2021    CALCIUM 9.1 04/24/2021    GFRAA >60 04/24/2021    LABGLOM >60 04/24/2021     PT/INR:    Lab Results   Component Value Date    PROTIME 12.7 04/24/2021    INR 1.09 04/24/2021     PTT:  No results found for: APTT[APTT        Impression/Plan:   82 y.o. M w/ Hx of paraplegia and NGB managed with chronic Dueñas who comes in after traumatic catheterization earlier today. Multiple RNs have been unable to pass any catheter. Under sterile technique I was able to place a regular 16 Fr Dueñas catheter without any significant resistance. He had ~1300 mL of UOP. I manually irrigated ~50 cc of clot from the bladder and irrigated him to clear with very minimal gross hematuria. 1. Keep Dueñas, no other intervention. OK from urology standpoint for discharge  2. Patient should follow up with me in 2-3 weeks to discuss further management.     Xiao Cason MD

## 2021-05-25 NOTE — PROGRESS NOTES
[facility]   Cardiac Consultation    Referring Provider:  Roxane Albarado MD     Chief Complaint   Patient presents with    Follow-up    Coronary Artery Disease    Hyperlipidemia    Hypertension    Edema     left leg 2-3 weeks, worsening in the last 4-5 days        History of Present Illness:   Carter Hughes is a 82 y.o. male who is here today for follow up for a history of coronary artery disease- history of CABG surgery in 1991, AAA, right BBB, hypertension, hyperlipidemia. He was admitted to [facility] 1/2020 for 5 days and treated for PNA. Since his last visit he underwent right above the knee amputation due to a non healing wound. Also treated for a DVT with AC and then had a GI bleed. Today he states he has been feeling well overall. He states his Lasix had been decreased to 20 mg this past January while he was in the hospital for a kidney stone which required a stent placement. Around one month ago he started to have a cough so his Lasix was increased back to 40 mg daily which helped some. He went to his PCP this Monday due to increased cough and swelling despite Lasix being 40 mg daily. His Lasix was increased to 60 mg for 3 days until this appointment today. Swelling and cough have overall decreased in the last few days. He denies any orthopnea and SOB. He is following with pulmonary who started him on Flonase and also Pepcid. He been COVID + as well as both vaccines and thinks his cough may be worse since. He tries to drink water throughout the day due to his kidney stones. He usually stays around or below 64 ounces daily which includes coffee and water. Blood pressure at home runs 114-116 for SBP on half a pill of Benicar-HCT. Patient currently denies any weight gain, edema, palpitations, chest pain, dizziness, and syncope.        Past Medical History:   has a past medical history of Arthritis, CAD (coronary artery disease), History of blood transfusion, Hx of blood clots, Hyperlipidemia, Hypertension, Paraplegic spinal paralysis ([facility] [address] Prostate cancer ([facility] [address] and Wears glasses. Surgical History:   has a past surgical history that includes Cardiac surgery; back surgery; Foot Debridement (Left, 3/10/2020); above knee amputation (Right, 07/2020); and Upper gastrointestinal endoscopy (N/A, 9/22/2020). Social History:   reports that he quit smoking about 45 years ago. He has never used smokeless tobacco. He reports previous alcohol use. He reports that he does not use drugs. Family History:  family history includes Alzheimer's Disease in his mother; Heart Disease in his father. Home Medications:  Prior to Admission medications    Medication Sig Start Date End Date Taking?  Authorizing Provider   Cyanocobalamin (VITAMIN B 12 PO) Take by mouth   Yes Historical Provider, MD   famotidine (PEPCID) 20 MG tablet Take 20 mg by mouth 2 times daily   Yes Historical Provider, MD   ferrous sulfate (IRON 325) 325 (65 Fe) MG tablet Take 325 mg by mouth daily (with breakfast)   Yes Historical Provider, MD   furosemide (LASIX) 40 MG tablet TAKE ONE TABLET BY MOUTH DAILY 6/12/20  Yes Ely Rivas MD   lovastatin (MEVACOR) 40 MG tablet Take 40 mg by mouth nightly   Yes Historical Provider, MD   aspirin 81 MG tablet Take 81 mg by mouth daily   Yes Historical Provider, MD   albuterol sulfate  (90 BASE) MCG/ACT inhaler Inhale 2 puffs into the lungs as needed for Wheezing   Yes Historical Provider, MD   olmesartan-hydroCHLOROthiazide (BENICAR HCT) 20-12.5 MG per tablet Take 1 tablet by mouth daily  Patient taking differently: Take 1 tablet by mouth daily taking 1/13/21   Nevaeh Cárdenas MD   potassium chloride (KLOR-CON M) 10 MEQ extended release tablet Take 1 tablet by mouth daily  Patient taking differently: Take 10 mEq by mouth daily taking 9/29/20   Nevaeh Cárdenas MD   apixaban (ELIQUIS) 5 MG TABS tablet Take by mouth 2 times daily    Historical Provider, MD   acetaminophen (TYLENOL) 325 symmetrical and full  · There is no clubbing, cyanosis of the extremities. · 2-3 left LE edema  · Right BKA   · Femoral Arteries: 2+ and equal  · Pedal Pulses: 2+ and equal   Abdomen:  · No masses or tenderness  · Liver/Spleen: No Abnormalities Noted  Neurological/Psychiatric:  · Alert and oriented in all spheres  · Moves all extremities well  · Exhibits normal gait balance and coordination  · No abnormalities of mood, affect, memory, mentation, or behavior are noted    CT chest 1/23/2020  Ascending aorta measures up to 4.6 cm. No obvious change compared to prior given technical factors   Patchy tree-in-bud nodularity left upper lobe, likely postinflammatory-infectious. Right lower lobe is better aerated compared to prior   Cholelithiasis and nonobstructing left renal calculus. Stable left adrenal nodule     Echocardiogram 1/28/2020  Summary   Global left ventricular function is normal with ejection fraction estimated   from 50-55%   Grade I diastolic dysfunction with normal LV filling pressures    Vascular 1/2020  No DVT    Assessment:   Coronary artery disease- h/o PCI. CABG. Stable w/o angina  Dizziness - uncertain etiology. Description c/w vertigo. Improve when BP meds spread out over 24hrs. Now minimal. Never took meclizine. History of CABG in 1991   TAA- 4.6 by CT in 3/2017. Repeat 1/2020 stable. Right BBB  Leg edema - chronic, improved w/ lasix. Multifactorial including dependent, lack activity, meds, venous insuff, sleep apnea, HTN. Does not seem to have significant cardiac component. A little worse. 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  May have component of volume overload

## 2021-05-26 ENCOUNTER — OFFICE VISIT (OUTPATIENT)
Dept: CARDIOLOGY CLINIC | Age: 86
End: 2021-05-26
Payer: MEDICARE

## 2021-05-26 VITALS
BODY MASS INDEX: 31.29 KG/M2 | HEIGHT: 70 IN | SYSTOLIC BLOOD PRESSURE: 116 MMHG | DIASTOLIC BLOOD PRESSURE: 64 MMHG | HEART RATE: 67 BPM | OXYGEN SATURATION: 93 %

## 2021-05-26 DIAGNOSIS — R05.9 COUGH: ICD-10-CM

## 2021-05-26 DIAGNOSIS — I10 ESSENTIAL HYPERTENSION: ICD-10-CM

## 2021-05-26 DIAGNOSIS — I25.10 CORONARY ARTERY DISEASE INVOLVING NATIVE CORONARY ARTERY OF NATIVE HEART WITHOUT ANGINA PECTORIS: Primary | ICD-10-CM

## 2021-05-26 DIAGNOSIS — R60.9 EDEMA, UNSPECIFIED TYPE: ICD-10-CM

## 2021-05-26 DIAGNOSIS — E78.2 MIXED HYPERLIPIDEMIA: ICD-10-CM

## 2021-05-26 PROCEDURE — 1123F ACP DISCUSS/DSCN MKR DOCD: CPT | Performed by: INTERNAL MEDICINE

## 2021-05-26 PROCEDURE — 1036F TOBACCO NON-USER: CPT | Performed by: INTERNAL MEDICINE

## 2021-05-26 PROCEDURE — 99214 OFFICE O/P EST MOD 30 MIN: CPT | Performed by: INTERNAL MEDICINE

## 2021-05-26 PROCEDURE — 4040F PNEUMOC VAC/ADMIN/RCVD: CPT | Performed by: INTERNAL MEDICINE

## 2021-05-26 PROCEDURE — G8417 CALC BMI ABV UP PARAM F/U: HCPCS | Performed by: INTERNAL MEDICINE

## 2021-05-26 PROCEDURE — G8427 DOCREV CUR MEDS BY ELIG CLIN: HCPCS | Performed by: INTERNAL MEDICINE

## 2021-05-26 RX ORDER — FAMOTIDINE 20 MG/1
20 TABLET, FILM COATED ORAL 2 TIMES DAILY
COMMUNITY
End: 2021-07-28

## 2021-05-26 NOTE — PATIENT INSTRUCTIONS
Plan:  Limit fluids to no more than 2 liters per day   Take Lasix 40 mg today and then 80 mg for 2 days. After that decrease back to Lasix 60 mg daily.  Call if you start to feel dizzy or light headed or if blood pressure starts to run over 100 for top number    Take an extra Potassium total of 2 pills daily until after we have repeat blood work   Labs- Monday or Tuesday- BNP and BMP   Can take an extra lasix for a few days if needed for an increase in swelling and cough   May repeat echocardiogram and chest Xray if cough continues despite increase in Lasix   Cardiac medications reviewed including indications and pertinent side effects    Check blood pressure and heart rate at home a few times per week- keep a log with dates and times and bring to office visit  Regular exercise and following a healthy diet encouraged   Follow up with me in 3-4 weeks   Call us next week with an update

## 2021-05-28 ENCOUNTER — HOSPITAL ENCOUNTER (OUTPATIENT)
Dept: GENERAL RADIOLOGY | Age: 86
Discharge: HOME | End: 2021-05-28
Payer: MEDICARE

## 2021-05-28 ENCOUNTER — HOSPITAL ENCOUNTER (OUTPATIENT)
Age: 86
Discharge: HOME | End: 2021-05-28
Payer: MEDICARE

## 2021-05-28 DIAGNOSIS — R05.9 COUGH: ICD-10-CM

## 2021-05-28 DIAGNOSIS — R60.9 EDEMA, UNSPECIFIED TYPE: ICD-10-CM

## 2021-05-28 PROCEDURE — 71046 X-RAY EXAM CHEST 2 VIEWS: CPT

## 2021-06-01 LAB
ANION GAP SERPL CALCULATED.3IONS-SCNC: 8 MMOL/L (ref 3–16)
B-TYPE NATRIURETIC PEPTIDE: 43 PG/ML (ref 0–100)
BUN BLDV-MCNC: 27 MG/DL (ref 7–25)
CALCIUM SERPL-MCNC: 8.9 MG/DL (ref 8.6–10.3)
CHLORIDE BLD-SCNC: 105 MMOL/L (ref 98–110)
CO2: 33 MMOL/L (ref 21–33)
CREAT SERPL-MCNC: 0.84 MG/DL (ref 0.6–1.3)
GFR, ESTIMATED: 80 SEE NOTE.
GFR, ESTIMATED: >90 SEE NOTE.
GLUCOSE BLD-MCNC: 130 MG/DL (ref 70–100)
OSMOLALITY CALCULATION: 309 MOSM/KG (ref 278–305)
POTASSIUM SERPL-SCNC: 3.2 MMOL/L (ref 3.5–5.3)
SODIUM BLD-SCNC: 146 MMOL/L (ref 133–146)

## 2021-06-02 ENCOUNTER — TELEPHONE (OUTPATIENT)
Dept: CARDIOLOGY CLINIC | Age: 86
End: 2021-06-02

## 2021-06-02 NOTE — TELEPHONE ENCOUNTER
Spoke with patient's daughter and gave her instructions per [address]- patient is to take 60 meq of potassium today and then 20 meq daily. Patient is to take Lasix 60 mg daily.  Also explained that chest Xray drake not show ant pulmonary edema and BNP is normal.

## 2021-06-02 NOTE — TELEPHONE ENCOUNTER
----- Message from Balta Becker MD sent at 6/1/2021  3:05 PM EDT -----  Call  Labs ok except K low  Take extra 3 KCl pills today

## 2021-06-14 RX ORDER — POTASSIUM CHLORIDE 750 MG/1
10 TABLET, EXTENDED RELEASE ORAL 2 TIMES DAILY
Qty: 180 TABLET | Refills: 1 | Status: SHIPPED | OUTPATIENT
Start: 2021-06-14 | End: 2021-08-23 | Stop reason: SDUPTHER

## 2021-07-02 LAB
EKG ATRIAL RATE: 89 BPM
EKG DIAGNOSIS: NORMAL
EKG P AXIS: 22 DEGREES
EKG P-R INTERVAL: 152 MS
EKG Q-T INTERVAL: 424 MS
EKG QRS DURATION: 144 MS
EKG QTC CALCULATION (BAZETT): 515 MS
EKG R AXIS: 53 DEGREES
EKG T AXIS: 14 DEGREES
EKG VENTRICULAR RATE: 89 BPM

## 2021-07-27 NOTE — PROGRESS NOTES
[facility]   Cardiac Consultation    Referring Provider:  Sven Crespo MD     Chief Complaint   Patient presents with    Follow-up    Leg Swelling    Cough    Fever     99.5-100.5 prior to antibiotics. no fever since monday        History of Present Illness:   Quinn Mazariegos is a 82 y.o. male who is here today for follow up for a history of coronary artery disease- history of CABG surgery in 1991, AAA, right BBB, hypertension, hyperlipidemia. [facility] and [facility] for PNA. Since his last visit he underwent right above the knee amputation due to a non healing wound. Also treated for a DVT with AC and then had a GI bleed. He is here today for increased SOB as well as swelling. Most recent echocardiogram from 1/28/2020 showed an EF of 50-55%. Today he states toe is unable to heal due to swelling. Swellinmg has been an issue foir weeks. Cough started last week. , low fever. COVID test neg. On Monday Chest XRay looked fine. [place] fluid in lungs and referred to Cardiology. Urine output normal yesterday. Coughing up clear, some yellow fluid. No shortness of breath. Cough does not get worse sitting/laying down. Hard time getting up in the mornings, cough better with time. Fever was 100.5 at highest. No fever since. No known weight gain. Toe looks better than Monday. He states he has some SOB with his cough. Patient currently denies any weight gain, edema, palpitations, chest pain,  dizziness, and syncope. Past Medical History:   has a past medical history of Arthritis, CAD (coronary artery disease), History of blood transfusion, Hx of blood clots, Hyperlipidemia, Hypertension, Paraplegic spinal paralysis ([address] Prostate cancer ([facility] [address] and Wears glasses. Surgical History:   has a past surgical history that includes Cardiac surgery; back surgery;  Foot Debridement (Left, 3/10/2020); above knee amputation (Right, 07/2020); and Upper gastrointestinal endoscopy (N/A, 9/22/2020). Social History:   reports that he quit smoking about 45 years ago. He has never used smokeless tobacco. He reports previous alcohol use. He reports that he does not use drugs. Family History:  family history includes Alzheimer's Disease in his mother; Heart Disease in his father. Home Medications:  Prior to Admission medications    Medication Sig Start Date End Date Taking? Authorizing Provider   omeprazole (PRILOSEC) 20 MG delayed release capsule Take 40 mg by mouth Daily   Yes Historical Provider, MD   tiotropium (SPIRIVA) 18 MCG inhalation capsule Inhale 18 mcg into the lungs daily   Yes Historical Provider, MD   levoFLOXacin (LEVAQUIN) 750 MG tablet Take 750 mg by mouth daily   Yes Historical Provider, MD   potassium chloride (KLOR-CON M) 10 MEQ extended release tablet Take 1 tablet by mouth 2 times daily 6/14/21  Yes Veronica Lawrence MD   Cyanocobalamin (VITAMIN B 12 PO) Take by mouth   Yes Historical Provider, MD   olmesartan-hydroCHLOROthiazide (BENICAR HCT) 20-12.5 MG per tablet Take 1 tablet by mouth daily  Patient taking differently: Take 0.5 tablets by mouth daily taking 1/13/21  Yes Veronica Lawrence MD   ferrous sulfate (IRON 325) 325 (65 Fe) MG tablet Take 325 mg by mouth daily (with breakfast)   Yes Historical Provider, MD   furosemide (LASIX) 40 MG tablet TAKE ONE TABLET BY MOUTH DAILY  Patient taking differently: 60 mg daily  6/12/20  Yes Elvi Valiente MD   acetaminophen (TYLENOL) 325 MG tablet Take 325 mg by mouth 2 times daily    Yes Historical Provider, MD   lovastatin (MEVACOR) 40 MG tablet Take 40 mg by mouth nightly   Yes Historical Provider, MD   aspirin 81 MG tablet Take 81 mg by mouth daily   Yes Historical Provider, MD   albuterol sulfate  (90 BASE) MCG/ACT inhaler Inhale 2 puffs into the lungs as needed for Wheezing   Yes Historical Provider, MD        Allergies:  Patient has no known allergies.      Review of Systems:   · Constitutional: there has Noted  Neurological/Psychiatric:  · Alert and oriented in all spheres  · Moves all extremities well  · Exhibits normal gait balance and coordination  · No abnormalities of mood, affect, memory, mentation, or behavior are noted    CT chest 1/23/2020  Ascending aorta measures up to 4.6 cm. No obvious change compared to prior given technical factors   Patchy tree-in-bud nodularity left upper lobe, likely postinflammatory-infectious. Right lower lobe is better aerated compared to prior   Cholelithiasis and nonobstructing left renal calculus. Stable left adrenal nodule     Echocardiogram 1/28/2020  Summary   Global left ventricular function is normal with ejection fraction estimated   from 50-55%   Grade I diastolic dysfunction with normal LV filling pressures    Vascular 1/2020  No DVT    Assessment:   Coronary artery disease- h/o PCI. CABG. Stable w/o angina  Dizziness -  Improve when BP meds spread out over 24hrs. Now remains minimal.    History of CABG in 1991   TAA- 4.6 by CT in 3/2017. Repeat 1/2020 stable. Right BBB  Leg edema - chronic, improved w/ lasix. Multifactorial including dependent, lack activity, meds, venous insuff, sleep apnea, HTN. Does not seem to have significant cardiac component. Worse recently and today improved post increase lasix per PCP. 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  May have component of volume overload     Plan:  60mg Lasix twice a day x 5 days  20 K twice per day x 5 days  Match any increase in Lasix with extra K  Repeat Labs next week BNP and BMP  Continue usual BP medications  Cardiac medications reviewed including indications and pertinent side effects    Check blood pressure and heart rate at home a few times per week- keep a log with dates and times and bring to office visit   Regular exercise and following a healthy diet encouraged   Follow up with me next week     Scribe's attestation: This note was scribed in the presence of Dr. Anjelica Chatman M.D. By Oumar Maier RN    The scribes documentation has been prepared under my direction and personally reviewed by me in its entirety. I confirm that the note above accurately reflects all work, treatment, procedures, and medical decision making performed by me. Dr. Anjelica Chatman MD    Thank you for allowing me to participate in the care of this individual.      America Rodgers.  Tiffany Mccullough M.D., [facility] [place]

## 2021-07-28 ENCOUNTER — OFFICE VISIT (OUTPATIENT)
Dept: CARDIOLOGY CLINIC | Age: 86
End: 2021-07-28
Payer: MEDICARE

## 2021-07-28 VITALS
WEIGHT: 230 LBS | OXYGEN SATURATION: 95 % | HEART RATE: 81 BPM | DIASTOLIC BLOOD PRESSURE: 50 MMHG | BODY MASS INDEX: 32.93 KG/M2 | SYSTOLIC BLOOD PRESSURE: 102 MMHG | HEIGHT: 70 IN

## 2021-07-28 DIAGNOSIS — R05.9 COUGH: ICD-10-CM

## 2021-07-28 DIAGNOSIS — I10 ESSENTIAL HYPERTENSION: ICD-10-CM

## 2021-07-28 DIAGNOSIS — I25.10 CORONARY ARTERY DISEASE INVOLVING NATIVE CORONARY ARTERY OF NATIVE HEART WITHOUT ANGINA PECTORIS: Primary | ICD-10-CM

## 2021-07-28 DIAGNOSIS — R06.02 SOB (SHORTNESS OF BREATH): ICD-10-CM

## 2021-07-28 DIAGNOSIS — E78.2 MIXED HYPERLIPIDEMIA: ICD-10-CM

## 2021-07-28 PROCEDURE — 99214 OFFICE O/P EST MOD 30 MIN: CPT | Performed by: INTERNAL MEDICINE

## 2021-07-28 PROCEDURE — 1123F ACP DISCUSS/DSCN MKR DOCD: CPT | Performed by: INTERNAL MEDICINE

## 2021-07-28 PROCEDURE — G8417 CALC BMI ABV UP PARAM F/U: HCPCS | Performed by: INTERNAL MEDICINE

## 2021-07-28 PROCEDURE — 4040F PNEUMOC VAC/ADMIN/RCVD: CPT | Performed by: INTERNAL MEDICINE

## 2021-07-28 PROCEDURE — 1036F TOBACCO NON-USER: CPT | Performed by: INTERNAL MEDICINE

## 2021-07-28 PROCEDURE — G8427 DOCREV CUR MEDS BY ELIG CLIN: HCPCS | Performed by: INTERNAL MEDICINE

## 2021-07-28 RX ORDER — LEVOFLOXACIN 750 MG/1
750 TABLET ORAL DAILY
Status: ON HOLD | COMMUNITY
End: 2022-08-02 | Stop reason: ALTCHOICE

## 2021-07-28 RX ORDER — OMEPRAZOLE 20 MG/1
40 CAPSULE, DELAYED RELEASE ORAL DAILY
Status: ON HOLD | COMMUNITY
End: 2022-08-02

## 2021-07-28 NOTE — PATIENT INSTRUCTIONS
Plan:  60mg Lasix twice a day through Sunday  20 K twice per day Through Sunday  Match any increase in Lasix with extra K  Repeat Labs next week BNP and BMP  Only one dose of medications on Monday morning  Continue normal BP medications  Cardiac medications reviewed including indications and pertinent side effects    Check blood pressure and heart rate at home a few times per week- keep a log with dates and times and bring to office visit   Regular exercise and following a healthy diet encouraged   Follow up with me next week

## 2021-08-02 LAB
ANION GAP SERPL CALCULATED.3IONS-SCNC: 10 MMOL/L (ref 3–16)
B-TYPE NATRIURETIC PEPTIDE: 66 PG/ML (ref 0–100)
BUN BLDV-MCNC: 31 MG/DL (ref 7–25)
CALCIUM SERPL-MCNC: 9.2 MG/DL (ref 8.6–10.3)
CHLORIDE BLD-SCNC: 104 MMOL/L (ref 98–110)
CO2: 32 MMOL/L (ref 21–33)
CREAT SERPL-MCNC: 0.9 MG/DL (ref 0.6–1.3)
GFR, ESTIMATED: 78 SEE NOTE.
GFR, ESTIMATED: >90 SEE NOTE.
GLUCOSE BLD-MCNC: 90 MG/DL (ref 70–100)
OSMOLALITY CALCULATION: 308 MOSM/KG (ref 278–305)
POTASSIUM SERPL-SCNC: 3.9 MMOL/L (ref 3.5–5.3)
SODIUM BLD-SCNC: 146 MMOL/L (ref 133–146)

## 2021-08-03 DIAGNOSIS — I10 ESSENTIAL HYPERTENSION: Primary | ICD-10-CM

## 2021-08-03 DIAGNOSIS — R06.02 SOB (SHORTNESS OF BREATH): ICD-10-CM

## 2021-08-04 ENCOUNTER — TELEPHONE (OUTPATIENT)
Dept: CARDIOLOGY CLINIC | Age: 86
End: 2021-08-04

## 2021-08-04 RX ORDER — FUROSEMIDE 40 MG/1
60 TABLET ORAL 2 TIMES DAILY
Qty: 180 TABLET | Refills: 3 | Status: ON HOLD | OUTPATIENT
Start: 2021-08-04 | End: 2022-08-02

## 2021-08-04 NOTE — TELEPHONE ENCOUNTER
[facility] from [address]. Jocelyne Guan is needing clarification on pt's lasix and potassium dosing. I gave her the information from pt's last ov on 7-28-21. They need to know what the pt should be taking now. Please advise and call [facility] at [phone].

## 2021-08-19 LAB
BUN BLDV-MCNC: 16 MG/DL
CALCIUM SERPL-MCNC: 8.7 MG/DL
CHLORIDE BLD-SCNC: 103 MMOL/L
CO2: 32 MMOL/L
CREAT SERPL-MCNC: 0.66 MG/DL
GFR CALCULATED: NORMAL
GLUCOSE BLD-MCNC: 129 MG/DL
POTASSIUM SERPL-SCNC: 3.1 MMOL/L
SODIUM BLD-SCNC: 142 MMOL/L

## 2021-08-23 DIAGNOSIS — I25.10 CORONARY ARTERY DISEASE INVOLVING NATIVE CORONARY ARTERY OF NATIVE HEART WITHOUT ANGINA PECTORIS: Primary | ICD-10-CM

## 2021-08-23 DIAGNOSIS — I10 ESSENTIAL HYPERTENSION: ICD-10-CM

## 2021-08-23 RX ORDER — POTASSIUM CHLORIDE 20 MEQ/1
20 TABLET, EXTENDED RELEASE ORAL 2 TIMES DAILY
Qty: 90 TABLET | Refills: 1 | Status: SHIPPED
Start: 2021-08-23 | End: 2022-08-03

## 2021-08-27 PROBLEM — R05.9 COUGH: Status: RESOLVED | Noted: 2021-07-28 | Resolved: 2021-08-27

## 2021-09-02 ENCOUNTER — HOSPITAL ENCOUNTER (EMERGENCY)
Age: 86
Discharge: HOME | End: 2021-09-02
Attending: EMERGENCY MEDICINE
Payer: MEDICARE

## 2021-09-02 VITALS
OXYGEN SATURATION: 93 % | WEIGHT: 207 LBS | TEMPERATURE: 99.7 F | BODY MASS INDEX: 29.63 KG/M2 | HEIGHT: 70 IN | HEART RATE: 91 BPM | SYSTOLIC BLOOD PRESSURE: 111 MMHG | RESPIRATION RATE: 18 BRPM | DIASTOLIC BLOOD PRESSURE: 66 MMHG

## 2021-09-02 DIAGNOSIS — T83.511A URINARY TRACT INFECTION ASSOCIATED WITH INDWELLING URETHRAL CATHETER, INITIAL ENCOUNTER (HCC): Primary | ICD-10-CM

## 2021-09-02 DIAGNOSIS — L89.899: ICD-10-CM

## 2021-09-02 DIAGNOSIS — N39.0 URINARY TRACT INFECTION ASSOCIATED WITH INDWELLING URETHRAL CATHETER, INITIAL ENCOUNTER (HCC): Primary | ICD-10-CM

## 2021-09-02 LAB
ANION GAP SERPL CALCULATED.3IONS-SCNC: 17 MMOL/L (ref 3–16)
BACTERIA: ABNORMAL /HPF
BASOPHILS ABSOLUTE: 0.1 K/UL (ref 0–0.2)
BASOPHILS RELATIVE PERCENT: 0.5 %
BILIRUBIN URINE: NEGATIVE
BLOOD, URINE: ABNORMAL
BUN BLDV-MCNC: 17 MG/DL (ref 7–20)
CALCIUM SERPL-MCNC: 9.5 MG/DL (ref 8.3–10.6)
CHLORIDE BLD-SCNC: 97 MMOL/L (ref 99–110)
CLARITY: ABNORMAL
CO2: 24 MMOL/L (ref 21–32)
COLOR: YELLOW
CREAT SERPL-MCNC: 1.1 MG/DL (ref 0.8–1.3)
EOSINOPHILS ABSOLUTE: 0.2 K/UL (ref 0–0.6)
EOSINOPHILS RELATIVE PERCENT: 1.1 %
GFR AFRICAN AMERICAN: >60
GFR NON-AFRICAN AMERICAN: >60
GLUCOSE BLD-MCNC: 123 MG/DL (ref 70–99)
GLUCOSE URINE: NEGATIVE MG/DL
HCT VFR BLD CALC: 33.4 % (ref 40.5–52.5)
HEMOGLOBIN: 11 G/DL (ref 13.5–17.5)
KETONES, URINE: NEGATIVE MG/DL
LEUKOCYTE ESTERASE, URINE: ABNORMAL
LYMPHOCYTES ABSOLUTE: 1 K/UL (ref 1–5.1)
LYMPHOCYTES RELATIVE PERCENT: 7.4 %
MCH RBC QN AUTO: 30.2 PG (ref 26–34)
MCHC RBC AUTO-ENTMCNC: 33 G/DL (ref 31–36)
MCV RBC AUTO: 91.4 FL (ref 80–100)
MICROSCOPIC EXAMINATION: YES
MONOCYTES ABSOLUTE: 1 K/UL (ref 0–1.3)
MONOCYTES RELATIVE PERCENT: 7 %
NEUTROPHILS ABSOLUTE: 11.8 K/UL (ref 1.7–7.7)
NEUTROPHILS RELATIVE PERCENT: 84 %
NITRITE, URINE: NEGATIVE
PDW BLD-RTO: 17.9 % (ref 12.4–15.4)
PH UA: 7.5 (ref 5–8)
PLATELET # BLD: 356 K/UL (ref 135–450)
PMV BLD AUTO: 7.3 FL (ref 5–10.5)
POTASSIUM REFLEX MAGNESIUM: 4.7 MMOL/L (ref 3.5–5.1)
PROTEIN UA: ABNORMAL MG/DL
RBC # BLD: 3.66 M/UL (ref 4.2–5.9)
RBC UA: ABNORMAL /HPF (ref 0–4)
SODIUM BLD-SCNC: 138 MMOL/L (ref 136–145)
SPECIFIC GRAVITY UA: 1.01 (ref 1–1.03)
URINE REFLEX TO CULTURE: YES
URINE TYPE: ABNORMAL
UROBILINOGEN, URINE: 0.2 E.U./DL
WBC # BLD: 14 K/UL (ref 4–11)
WBC UA: ABNORMAL /HPF (ref 0–5)

## 2021-09-02 PROCEDURE — 87086 URINE CULTURE/COLONY COUNT: CPT

## 2021-09-02 PROCEDURE — 81001 URINALYSIS AUTO W/SCOPE: CPT

## 2021-09-02 PROCEDURE — 36415 COLL VENOUS BLD VENIPUNCTURE: CPT

## 2021-09-02 PROCEDURE — 99283 EMERGENCY DEPT VISIT LOW MDM: CPT

## 2021-09-02 PROCEDURE — 2580000003 HC RX 258: Performed by: PHYSICIAN ASSISTANT

## 2021-09-02 PROCEDURE — 96365 THER/PROPH/DIAG IV INF INIT: CPT

## 2021-09-02 PROCEDURE — 6360000002 HC RX W HCPCS: Performed by: PHYSICIAN ASSISTANT

## 2021-09-02 PROCEDURE — 85025 COMPLETE CBC W/AUTO DIFF WBC: CPT

## 2021-09-02 PROCEDURE — 51702 INSERT TEMP BLADDER CATH: CPT

## 2021-09-02 PROCEDURE — 80048 BASIC METABOLIC PNL TOTAL CA: CPT

## 2021-09-02 RX ORDER — CEPHALEXIN 500 MG/1
500 CAPSULE ORAL 4 TIMES DAILY
Qty: 28 CAPSULE | Refills: 0 | Status: SHIPPED | OUTPATIENT
Start: 2021-09-02 | End: 2021-09-09

## 2021-09-02 RX ADMIN — DEXTROSE MONOHYDRATE 1000 MG: 5 INJECTION INTRAVENOUS at 21:52

## 2021-09-02 ASSESSMENT — ENCOUNTER SYMPTOMS
SHORTNESS OF BREATH: 0
ABDOMINAL PAIN: 0
COLOR CHANGE: 0
DIARRHEA: 0
VOMITING: 0
SORE THROAT: 0
COUGH: 0
NAUSEA: 0
WHEEZING: 0
ABDOMINAL DISTENTION: 0
TROUBLE SWALLOWING: 0
RHINORRHEA: 0
CHEST TIGHTNESS: 0
EYE PAIN: 0

## 2021-09-02 NOTE — ED TRIAGE NOTES
Patient has urinary catheter and in the last 48 hours has had increased foul smell and pus in the drainage bag. Today has had no output.

## 2021-09-02 NOTE — ED PROVIDER NOTES
[address] [facility]          PHYSICIAN ASSISTANT NOTE       Date of evaluation: 9/2/2021    Chief Complaint     Urinary Catheter Problem      History of Present Illness     Hamlet Cardenas is a 82 y.o. male with a past medical history as noted below who presents to the Emergency Department with a complaint of urinary retention, enuresis from Dueñas catheter and foul-smelling, purulent urine within the drainage bag. The patient's family reports that over the past couple of days, they have noticed an increased in foul-smelling drainage from the patient's urinary catheter into the drainage bag. He went to the physician office today to have the Dueñas catheter switched out, which is only a week old, but there was not a physician available to perform the procedure, so it was recommended that they come to the emergency department for evaluation. He was, however, started on Bactrim for presumptive urinary tract infection. 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 He is not currently experiencing any pain. No urine production noted at all today. Scant amount of urine yesterday with purulent drainage from the glans region of the penis. Not experiencing any fevers, chills, sweats, malaise, myalgias or other constitutional symptoms. Review of Systems     Review of Systems   Constitutional: Negative for chills, diaphoresis, fatigue and fever. HENT: Negative for congestion, postnasal drip, rhinorrhea, sore throat and trouble swallowing. Eyes: Negative for pain and visual disturbance. Respiratory: Negative for cough, chest tightness, shortness of breath and wheezing. Cardiovascular: Negative for chest pain, palpitations and leg swelling. Gastrointestinal: Negative for abdominal distention, abdominal pain, diarrhea, nausea and vomiting. Endocrine: Negative for polydipsia and polyuria.    Genitourinary: Positive for OLMESARTAN-HYDROCHLOROTHIAZIDE (BENICAR HCT) 20-12.5 MG PER TABLET    Take 1 tablet by mouth daily    OMEPRAZOLE (PRILOSEC) 20 MG DELAYED RELEASE CAPSULE    Take 40 mg by mouth Daily    POTASSIUM CHLORIDE (KLOR-CON M) 20 MEQ EXTENDED RELEASE TABLET    Take 1 tablet by mouth 2 times daily    TIOTROPIUM (SPIRIVA) 18 MCG INHALATION CAPSULE    Inhale 18 mcg into the lungs daily       Allergies     He has No Known Allergies. Physical Exam     INITIAL VITALS: BP: (!) 131/93, Temp: 99.7 °F (37.6 °C), Pulse: 91, Resp: 18, SpO2: 96 %  Physical Exam  Vitals and nursing note reviewed. Exam conducted with a chaperone present. Constitutional:       General: He is not in acute distress. Appearance: He is well-developed. He is not diaphoretic. HENT:      Head: Normocephalic and atraumatic. Eyes:      Pupils: Pupils are equal, round, and reactive to light. Neck:      Vascular: No JVD. Cardiovascular:      Rate and Rhythm: Normal rate and regular rhythm. Pulmonary:      Effort: Pulmonary effort is normal. No respiratory distress. Breath sounds: Normal breath sounds. No stridor. No wheezing or rales. Chest:      Chest wall: No tenderness. Abdominal:      General: There is no distension. Palpations: Abdomen is soft. Tenderness: There is no abdominal tenderness. Genitourinary:     Pubic Area: No rash. Penis: Circumcised. Erythema, discharge and lesions present. No tenderness or swelling. Comments: Retracted penis, with purulent drainage from the glans, erythematous with a stage II decubitus ulceration, measuring 1 cm x 8 mm along the dorsal aspect of the shaft which appears to have been caused by the Dueñas catheter. Musculoskeletal:         General: No tenderness. Normal range of motion. Cervical back: Normal range of motion and neck supple. Skin:     General: Skin is warm and dry. Coloration: Skin is not pale. Findings: No erythema or rash.    Neurological: Mental Status: He is alert and oriented to person, place, and time.       Coordination: Coordination normal.         Diagnostic Results     RADIOLOGY:  No orders to display       LABS:   Results for orders placed or performed during the hospital encounter of 40/97/00   Basic Metabolic Panel w/ Reflex to MG   Result Value Ref Range    Sodium 138 136 - 145 mmol/L    Potassium reflex Magnesium 4.7 3.5 - 5.1 mmol/L    Chloride 97 (L) 99 - 110 mmol/L    CO2 24 21 - 32 mmol/L    Anion Gap 17 (H) 3 - 16    Glucose 123 (H) 70 - 99 mg/dL    BUN 17 7 - 20 mg/dL    CREATININE 1.1 0.8 - 1.3 mg/dL    GFR Non-African American >60 >60    GFR African American >60 >60    Calcium 9.5 8.3 - 10.6 mg/dL   Urinalysis Reflex to Culture    Specimen: Urine, clean catch   Result Value Ref Range    Color, UA Yellow Straw/Yellow    Clarity, UA CLOUDY (A) Clear    Glucose, Ur Negative Negative mg/dL    Bilirubin Urine Negative Negative    Ketones, Urine Negative Negative mg/dL    Specific Gravity, UA 1.015 1.005 - 1.030    Blood, Urine MODERATE (A) Negative    pH, UA 7.5 5.0 - 8.0    Protein, UA TRACE (A) Negative mg/dL    Urobilinogen, Urine 0.2 <2.0 E.U./dL    Nitrite, Urine Negative Negative    Leukocyte Esterase, Urine LARGE (A) Negative    Microscopic Examination YES     Urine Type NotGiven     Urine Reflex to Culture Yes    CBC Auto Differential   Result Value Ref Range    WBC 14.0 (H) 4.0 - 11.0 K/uL    RBC 3.66 (L) 4.20 - 5.90 M/uL    Hemoglobin 11.0 (L) 13.5 - 17.5 g/dL    Hematocrit 33.4 (L) 40.5 - 52.5 %    MCV 91.4 80.0 - 100.0 fL    MCH 30.2 26.0 - 34.0 pg    MCHC 33.0 31.0 - 36.0 g/dL    RDW 17.9 (H) 12.4 - 15.4 %    Platelets 648 116 - 262 K/uL    MPV 7.3 5.0 - 10.5 fL    Neutrophils % 84.0 %    Lymphocytes % 7.4 %    Monocytes % 7.0 %    Eosinophils % 1.1 %    Basophils % 0.5 %    Neutrophils Absolute 11.8 (H) 1.7 - 7.7 K/uL    Lymphocytes Absolute 1.0 1.0 - 5.1 K/uL    Monocytes Absolute 1.0 0.0 - 1.3 K/uL    Eosinophils Absolute 0.2 0.0 - 0.6 K/uL    Basophils Absolute 0.1 0.0 - 0.2 K/uL   Microscopic Urinalysis   Result Value Ref Range    WBC, UA 21-50 (A) 0 - 5 /HPF    RBC, UA 11-20 (A) 0 - 4 /HPF    Bacteria, UA 3+ (A) None Seen /HPF       ED BEDSIDE ULTRASOUND:  N/A    RECENT VITALS:  BP: (!) 131/93, Temp: 99.7 °F (37.6 °C), Pulse: 91, Resp: 18, SpO2: 96 %     Procedures     N/A    ED Course     Nursing Notes, Past Medical Hx,Past Surgical Hx, Social Hx, Allergies, and Family Hx were reviewed. The patient was given the following medications:  Orders Placed This Encounter   Medications    cefTRIAXone (ROCEPHIN) 1000 mg IVPB in 50 mL D5W minibag     Order Specific Question:   Antimicrobial Indications     Answer:   Skin and Soft Tissue Infection     Order Specific Question:   Antimicrobial Indications     Answer:   Urinary Tract Infection    cephALEXin (KEFLEX) 500 MG capsule     Sig: Take 1 capsule by mouth 4 times daily for 7 days     Dispense:  28 capsule     Refill:  0       CONSULTS:  None    MEDICAL DECISION MAKING / ASSESSMENT / Robert Rahel is admitted to the Emergency Department for evaluation of his chief complaint as described in the history of present illness. Complete history and physical was performed by me and my attending. Nursing notes, past medical history, surgical history, family history and social history were reviewed and addressed in the HPI. Jagruti Smith is a 82 y.o. male who presents to the emergency department with a complaint of urinary retention/enuresis with Dueñas catheter in place with concern for urinary tract infection. The patient has paraplegia of the bilateral lower extremities with chronic Dueñas catheter placement. Over the past couple of days, the family has noted malodorous, purulent appearing discharge from the glans of the penis and today, noted that he had not passed any urine all day.   Was seen at the urology office earlier today, who noted the purulence from the glans of the penis, but had no one available to exchange his Fitzgerald catheter, so he was started on Bactrim and advised to come to the emergency department to have his Fitzgerald catheter exchanged. On arrival to the emergency department, the patient is hemodynamically stable and within normal limits. No tachycardia or fever. Slight, baseline hypertension noted. The patient shows no signs or symptoms of systemic infection or sepsis. In no apparent discomfort or distress. Physical examination reveals purulent drainage from the penis, surrounding the Fitzgerald catheter with little to no urine present in the urine collection bag. Penis is retracted- with exposure and retraction of the skin from around the glans, the patient has a small, grade 2 decubitus ulceration of the shaft of the penis noted. It is the same size as the fitzgerald catheter and appears to have been caused when the fitzgerald catheter became entrapped with the buried penis. The glans of the penis is erythematous with dried purulence under the remnants of the foreskin. After removal of the original Ftizgerald catheter, which was placed approximately a week ago, there was a large amount of drainage of purulence, followed by bloody drainage, followed by persistent drainage of urine. Once the flow of urine had stopped, this area was cleaned extensively, and a three-way catheter was placed in the bladder irrigated for the removal of sediment. Urinalysis confirms concern for urinary tract infection, culture pending. The patient does have a prescription for Bactrim. His primary care was made aware of this prescription, as the patient does take potassium, and has made adjustments to cut his potassium dose in half while on this medication.   Given the physical exam findings for the decubitus ulceration of the penis, I will add Keflex to the antibiotic regimen and give the patient an IV dose of Rocephin in the emergency department to cross cover appropriately for skin linda. CBC demonstrates a leukocytosis with neutrophilic dominance. Renal function is preserved without evidence of obstructive nephropathy. Evidence of slight dehydration, but the patient can rehydrate orally. Overall the patient looks very well, has caretakers at home and is safe to discharge for continued home care at this time. I discussed this plan at length the patient who verbalizes understanding and is in agreement. The patient is currently stable and will be discharged home for continued self-care. Please see patient's AVS for additional discharge instructions. The patient was seen and evaluated by myself and the attending physician, Daren Valverde MD who agrees with my assessment, treatment and plan. Clinical Impression     1. Urinary tract infection associated with indwelling urethral catheter, initial encounter ([facility] [address]    2.  Decubitus ulcer of penis, unspecified pressure ulcer stage        Disposition     PATIENT REFERRED TO:  Sherie Crigler, MD  [address] [place] [address]  [phone]    Schedule an appointment as soon as possible for a visit       [facility] Emergency Department  [address] [place] [address]  Go to   If symptoms worsen, including the development of fever above 100 °F, chills, worsening of the wound of the penile shaft, or other symptoms concerning for developing infection    Shayna Kamara MD  [facility]  [phone]    Call   To discuss wound care needs at home      DISCHARGE MEDICATIONS:  New Prescriptions    CEPHALEXIN (KEFLEX) 500 MG CAPSULE    Take 1 capsule by mouth 4 times daily for 7 days       DISPOSITION Discharge - Pending Orders Complete 09/02/2021 10:15:17 PM       [address] [place] [address]  09/02/21 [id number]

## 2021-09-03 LAB — URINE CULTURE, ROUTINE: NORMAL

## 2021-09-03 NOTE — ED NOTES
WAYLON paperwork and results explained to patient. Questions addressed and explained to patient's satisfaction.  Patient denies further needs and verbalized understanding of need for FU       Juan Lowery RN  09/02/21 7827

## 2021-09-03 NOTE — ED PROVIDER NOTES
ED Attending Attestation Note     Date of evaluation: 9/2/2021    This patient was seen by the advance practice provider. I have seen and examined the patient, agree with the workup, evaluation, management and diagnosis. The care plan has been discussed. My assessment reveals an 82-year-old male patient who presents with urinary retention. Family is noted that his urine has been more foul-smelling and cloudy. He was reportedly placed on Bactrim prophylactically. Patient has a low-grade temperature of 99.7 and is borderline tachycardic. He was sent in from his primary care office to have his Dueñas catheter switched. Urine in tubing and bag looks quite purulent. Patient is nontoxic-appearing but is chronically ill.      Anila Levy MD  09/02/21 2122

## 2021-10-16 ENCOUNTER — HOSPITAL ENCOUNTER (EMERGENCY)
Age: 86
Discharge: HOME | End: 2021-10-16
Attending: EMERGENCY MEDICINE
Payer: MEDICARE

## 2021-10-16 VITALS
DIASTOLIC BLOOD PRESSURE: 84 MMHG | SYSTOLIC BLOOD PRESSURE: 166 MMHG | RESPIRATION RATE: 19 BRPM | HEART RATE: 93 BPM | OXYGEN SATURATION: 99 % | TEMPERATURE: 98.9 F

## 2021-10-16 DIAGNOSIS — T83.011A MALFUNCTION OF FOLEY CATHETER, INITIAL ENCOUNTER (HCC): Primary | ICD-10-CM

## 2021-10-16 LAB
BACTERIA: ABNORMAL /HPF
BILIRUBIN URINE: NEGATIVE
BLOOD, URINE: ABNORMAL
CLARITY: CLEAR
COLOR: YELLOW
GLUCOSE URINE: NEGATIVE MG/DL
KETONES, URINE: NEGATIVE MG/DL
LEUKOCYTE ESTERASE, URINE: ABNORMAL
MICROSCOPIC EXAMINATION: YES
NITRITE, URINE: NEGATIVE
PH UA: 6 (ref 5–8)
PROTEIN UA: NEGATIVE MG/DL
RBC UA: ABNORMAL /HPF (ref 0–4)
SPECIFIC GRAVITY UA: 1.01 (ref 1–1.03)
URINE TYPE: ABNORMAL
UROBILINOGEN, URINE: 0.2 E.U./DL
WBC UA: ABNORMAL /HPF (ref 0–5)

## 2021-10-16 PROCEDURE — 87186 SC STD MICRODIL/AGAR DIL: CPT

## 2021-10-16 PROCEDURE — 99283 EMERGENCY DEPT VISIT LOW MDM: CPT

## 2021-10-16 PROCEDURE — 81001 URINALYSIS AUTO W/SCOPE: CPT

## 2021-10-16 PROCEDURE — 87077 CULTURE AEROBIC IDENTIFY: CPT

## 2021-10-16 PROCEDURE — 51702 INSERT TEMP BLADDER CATH: CPT

## 2021-10-16 PROCEDURE — 87086 URINE CULTURE/COLONY COUNT: CPT

## 2021-10-16 ASSESSMENT — ENCOUNTER SYMPTOMS
ABDOMINAL PAIN: 0
BACK PAIN: 0
SHORTNESS OF BREATH: 0
NAUSEA: 0
VOMITING: 0

## 2021-10-16 NOTE — ED PROVIDER NOTES
[address] [facility]          ATTENDING PHYSICIAN NOTE       Date of evaluation: 10/16/2021    Chief Complaint     Urinary Catheter Problem (pt states, \"I think my catheter is clogged or something. It started yesterday.' pt states he has had this problem befor eand that he just needed his catheter changed.)      History of Present Illness     Lia Hooks is a 82 y.o. male who presents to the emergency department stating that his chronic indwelling Dueñas catheter has not been draining. Mild constant discomfort without fever, vomiting, or other provoking or palliative features. He does not recall any event that may have compromised the functioning of the catheter. This catheter has been in place for roughly a month. Review of Systems     Review of Systems   Constitutional: Negative for fever. Respiratory: Negative for shortness of breath. Gastrointestinal: Negative for abdominal pain, nausea and vomiting. Genitourinary: Negative for flank pain. Musculoskeletal: Negative for back pain. Neurological: Negative for dizziness. All other systems reviewed and are negative. Past Medical, Surgical, Family, and Social History     He has a past medical history of Arthritis, CAD (coronary artery disease), History of blood transfusion, Hx of blood clots, Hyperlipidemia, Hypertension, Paraplegic spinal paralysis ([address] Prostate cancer ([facility] [place] [address] and Wears glasses. 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 His family history includes Alzheimer's Disease in his mother; Heart Disease in his father. He reports that he quit smoking about 45 years ago. He has never used smokeless tobacco. He reports previous alcohol use. He reports that he does not use drugs.     Medications     Previous Medications    ACETAMINOPHEN (TYLENOL) 325 MG TABLET    Take 325 mg by mouth 2 times daily     ALBUTEROL SULFATE  (90 BASE) MCG/ACT INHALER    Inhale 2 puffs into the lungs as needed for Wheezing    ASPIRIN 81 MG TABLET    Take 81 mg by mouth daily    CYANOCOBALAMIN (VITAMIN B 12 PO)    Take by mouth    FERROUS SULFATE (IRON 325) 325 (65 FE) MG TABLET    Take 325 mg by mouth daily (with breakfast)    FUROSEMIDE (LASIX) 40 MG TABLET    Take 1.5 tablets by mouth 2 times daily    LEVOFLOXACIN (LEVAQUIN) 750 MG TABLET    Take 750 mg by mouth daily    LOVASTATIN (MEVACOR) 40 MG TABLET    Take 40 mg by mouth nightly    OLMESARTAN-HYDROCHLOROTHIAZIDE (BENICAR HCT) 20-12.5 MG PER TABLET    Take 1 tablet by mouth daily    OMEPRAZOLE (PRILOSEC) 20 MG DELAYED RELEASE CAPSULE    Take 40 mg by mouth Daily    POTASSIUM CHLORIDE (KLOR-CON M) 20 MEQ EXTENDED RELEASE TABLET    Take 1 tablet by mouth 2 times daily    TIOTROPIUM (SPIRIVA) 18 MCG INHALATION CAPSULE    Inhale 18 mcg into the lungs daily       Allergies     He has No Known Allergies. Physical Exam     INITIAL VITALS: BP (!) 166/84   Pulse 93   Temp 98.9 °F (37.2 °C) (Oral)   Resp 19   SpO2 99%    General: Well developed, well nourished male in no acute distress. HEENT: Sclera white, conjunctiva pink, mucous membranes moist and oropharynx clear  Neck: Supple, no meningismus or JVD  Respirations: Unlabored with symmetric chest rise and fall  CV: Regular rate and rhythm with strong distal pulses  Abd: Soft without rebound guarding distention or focal tenderness  Musculoskeletal: Plegic in a wheelchair  Skin: Warm and dry with no rashes or lesions.   Neuro: Awake and alert with no mental status abnormalities  Psych: Mood and affect are normal.    Diagnostic Results     LABS:   Results for orders placed or performed during the hospital encounter of 10/16/21   Urinalysis, reflex to microscopic   Result Value Ref Range    Color, UA Yellow Straw/Yellow    Clarity, UA Clear Clear    Glucose, Ur Negative Negative mg/dL    Bilirubin Urine Negative Negative    Ketones, Urine Negative Negative mg/dL    Specific Gravity, UA 1.015 1.005 - 1.030    Blood, Urine TRACE-INTACT (A) Negative    pH, UA 6.0 5.0 - 8.0    Protein, UA Negative Negative mg/dL    Urobilinogen, Urine 0.2 <2.0 E.U./dL    Nitrite, Urine Negative Negative    Leukocyte Esterase, Urine LARGE (A) Negative    Microscopic Examination YES     Urine Type Voided    Microscopic Urinalysis   Result Value Ref Range    WBC, UA 21-50 (A) 0 - 5 /HPF    RBC, UA None seen 0 - 4 /HPF    Bacteria, UA 4+ (A) None Seen /HPF     **Culture sent and pending      RECENT VITALS:  BP: (!) 166/84, Temp: 98.9 °F (37.2 °C), Pulse: 93, Resp: 19, SpO2: 99 %       ED Course     Nursing Notes, Past Medical Hx, Past Surgical Hx, Social Hx, Allergies, and Family Hx were reviewed. He was seen and examined on arrival to the treatment area. Nursing notes and computerized medical records were reviewed. Nursing was able to flush his Dueñas catheter with roughly a liter drained since that time. He feels better. I first discussed aftercare and return precautions, and he expresses understanding. MEDICAL DECISION MAKING / ASSESSMENT / PLAN     Laya Meng is a 82 y.o. male presents to the emergency department with a malfunctioning Dueñas catheter. A simple flush was able to restore flow. He does have white blood cells in his urine. This is similar to the appearance last month, that resulted in a reassuring culture. Because he does not show signs of systemic illness, there is no evidence of sediment or clear evidence of infection in the urine, I will withhold antibiotics at this time until culture results guide differently. He is comfortable with this plan    Clinical Impression     1.  Malfunction of Dueñas catheter, initial encounter [facility])        Disposition     PATIENT REFERRED TO:  The [facility] Emergency Department  [address] [address]  [phone]    As needed, If symptoms worsen      DISCHARGE MEDICATIONS:  New Prescriptions    No medications on file       DISPOSITION Decision To Discharge 10/16/2021 04:36:26 PM          Mauricio Rivera MD  10/16/21 [id number]

## 2021-10-16 NOTE — ED NOTES
Dueñas flushed with 60mL normal saline. Pt tolerated well. Pt has voided 1000mL since flush.   Dueñas bag changed     Xiao Funes RN  10/16/21 6219

## 2021-10-16 NOTE — ED NOTES
Patient prepared for and ready to be discharged. Patient discharged at this time in no acute distress after verbalizing understanding of discharge instructions. Patient left after receiving After Visit Summary instructions.       Najma Flynn RN  10/16/21 5660

## 2021-10-18 LAB
ORGANISM: ABNORMAL
URINE CULTURE, ROUTINE: ABNORMAL

## 2021-10-29 NOTE — TELEPHONE ENCOUNTER
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 Recently diagnosed with DVT. Started on Eliquis and developed melana as well as at least one episode of hematemesis. Had associated anemia to hgb < 7 while inpatient at outside hospital requiring RBC transfusion. No GI evaluation at that time. Now seeing as outpatient. Eliquis on hold due to GI bleed. No colonoscopy for > 30 years. Please evaluate. Opt out

## 2022-08-01 ENCOUNTER — APPOINTMENT (OUTPATIENT)
Dept: GENERAL RADIOLOGY | Age: 87
DRG: 698 | End: 2022-08-01
Payer: MEDICARE

## 2022-08-01 ENCOUNTER — HOSPITAL ENCOUNTER (INPATIENT)
Age: 87
LOS: 1 days | Discharge: HOME | DRG: 698 | End: 2022-08-03
Attending: EMERGENCY MEDICINE | Admitting: INTERNAL MEDICINE
Payer: MEDICARE

## 2022-08-01 DIAGNOSIS — K92.1 MELENA: ICD-10-CM

## 2022-08-01 DIAGNOSIS — E87.0 HYPERNATREMIA: ICD-10-CM

## 2022-08-01 DIAGNOSIS — R62.7 FAILURE TO THRIVE IN ADULT: Primary | ICD-10-CM

## 2022-08-01 LAB
ANION GAP SERPL CALCULATED.3IONS-SCNC: 14 MMOL/L (ref 3–16)
BACTERIA: ABNORMAL /HPF
BASOPHILS ABSOLUTE: 0.1 K/UL (ref 0–0.2)
BASOPHILS RELATIVE PERCENT: 0.6 %
BILIRUBIN URINE: NEGATIVE
BLOOD, URINE: ABNORMAL
BUN BLDV-MCNC: 44 MG/DL (ref 7–20)
CALCIUM SERPL-MCNC: 9.1 MG/DL (ref 8.3–10.6)
CHLORIDE BLD-SCNC: 115 MMOL/L (ref 99–110)
CLARITY: CLEAR
CO2: 20 MMOL/L (ref 21–32)
COLOR: YELLOW
CREAT SERPL-MCNC: 0.7 MG/DL (ref 0.8–1.3)
EOSINOPHILS ABSOLUTE: 0.1 K/UL (ref 0–0.6)
EOSINOPHILS RELATIVE PERCENT: 1 %
GFR AFRICAN AMERICAN: >60
GFR NON-AFRICAN AMERICAN: >60
GLUCOSE BLD-MCNC: 134 MG/DL (ref 70–99)
GLUCOSE URINE: NEGATIVE MG/DL
HCT VFR BLD CALC: 27.8 % (ref 40.5–52.5)
HEMOGLOBIN: 8.9 G/DL (ref 13.5–17.5)
KETONES, URINE: NEGATIVE MG/DL
LEUKOCYTE ESTERASE, URINE: ABNORMAL
LYMPHOCYTES ABSOLUTE: 1.3 K/UL (ref 1–5.1)
LYMPHOCYTES RELATIVE PERCENT: 12.4 %
MCH RBC QN AUTO: 29.4 PG (ref 26–34)
MCHC RBC AUTO-ENTMCNC: 32 G/DL (ref 31–36)
MCV RBC AUTO: 91.9 FL (ref 80–100)
MICROSCOPIC EXAMINATION: YES
MONOCYTES ABSOLUTE: 1.1 K/UL (ref 0–1.3)
MONOCYTES RELATIVE PERCENT: 10.3 %
MUCUS: ABNORMAL /LPF
NEUTROPHILS ABSOLUTE: 8.2 K/UL (ref 1.7–7.7)
NEUTROPHILS RELATIVE PERCENT: 75.7 %
NITRITE, URINE: POSITIVE
PDW BLD-RTO: 19.7 % (ref 12.4–15.4)
PH UA: 6 (ref 5–8)
PLATELET # BLD: 407 K/UL (ref 135–450)
PMV BLD AUTO: 7.5 FL (ref 5–10.5)
POTASSIUM SERPL-SCNC: 3.5 MMOL/L (ref 3.5–5.1)
PROTEIN UA: ABNORMAL MG/DL
RBC # BLD: 3.03 M/UL (ref 4.2–5.9)
RBC UA: ABNORMAL /HPF (ref 0–4)
SODIUM BLD-SCNC: 149 MMOL/L (ref 136–145)
SPECIFIC GRAVITY UA: 1.02 (ref 1–1.03)
URINE TYPE: ABNORMAL
UROBILINOGEN, URINE: 0.2 E.U./DL
WBC # BLD: 10.9 K/UL (ref 4–11)
WBC UA: ABNORMAL /HPF (ref 0–5)

## 2022-08-01 PROCEDURE — 71045 X-RAY EXAM CHEST 1 VIEW: CPT

## 2022-08-01 PROCEDURE — 36415 COLL VENOUS BLD VENIPUNCTURE: CPT

## 2022-08-01 PROCEDURE — 51702 INSERT TEMP BLADDER CATH: CPT

## 2022-08-01 PROCEDURE — 87086 URINE CULTURE/COLONY COUNT: CPT

## 2022-08-01 PROCEDURE — 96361 HYDRATE IV INFUSION ADD-ON: CPT

## 2022-08-01 PROCEDURE — 80048 BASIC METABOLIC PNL TOTAL CA: CPT

## 2022-08-01 PROCEDURE — 99285 EMERGENCY DEPT VISIT HI MDM: CPT

## 2022-08-01 PROCEDURE — 2580000003 HC RX 258: Performed by: STUDENT IN AN ORGANIZED HEALTH CARE EDUCATION/TRAINING PROGRAM

## 2022-08-01 PROCEDURE — 85025 COMPLETE CBC W/AUTO DIFF WBC: CPT

## 2022-08-01 PROCEDURE — 93005 ELECTROCARDIOGRAM TRACING: CPT | Performed by: STUDENT IN AN ORGANIZED HEALTH CARE EDUCATION/TRAINING PROGRAM

## 2022-08-01 PROCEDURE — 81001 URINALYSIS AUTO W/SCOPE: CPT

## 2022-08-01 RX ORDER — SODIUM CHLORIDE, SODIUM LACTATE, POTASSIUM CHLORIDE, AND CALCIUM CHLORIDE .6; .31; .03; .02 G/100ML; G/100ML; G/100ML; G/100ML
1000 INJECTION, SOLUTION INTRAVENOUS ONCE
Status: COMPLETED | OUTPATIENT
Start: 2022-08-01 | End: 2022-08-01

## 2022-08-01 RX ADMIN — SODIUM CHLORIDE, SODIUM LACTATE, POTASSIUM CHLORIDE, AND CALCIUM CHLORIDE 1000 ML: .6; .31; .03; .02 INJECTION, SOLUTION INTRAVENOUS at 21:40

## 2022-08-01 ASSESSMENT — ENCOUNTER SYMPTOMS
COUGH: 0
NAUSEA: 0
RHINORRHEA: 0
SORE THROAT: 0
ABDOMINAL PAIN: 0
CHEST TIGHTNESS: 0
EYES NEGATIVE: 1
ABDOMINAL DISTENTION: 1
VOMITING: 0
SHORTNESS OF BREATH: 0
CONSTIPATION: 1
BLOOD IN STOOL: 0
WHEEZING: 0
TROUBLE SWALLOWING: 0
DIARRHEA: 0

## 2022-08-01 NOTE — Clinical Note
Discharge Plan[de-identified] Other/[facility])   Telemetry/Cardiac Monitoring Required?: Yes

## 2022-08-02 ENCOUNTER — APPOINTMENT (OUTPATIENT)
Dept: CT IMAGING | Age: 87
DRG: 698 | End: 2022-08-02
Payer: MEDICARE

## 2022-08-02 ENCOUNTER — ANESTHESIA (OUTPATIENT)
Dept: ENDOSCOPY | Age: 87
DRG: 698 | End: 2022-08-02
Payer: MEDICARE

## 2022-08-02 ENCOUNTER — ANESTHESIA EVENT (OUTPATIENT)
Dept: ENDOSCOPY | Age: 87
DRG: 698 | End: 2022-08-02
Payer: MEDICARE

## 2022-08-02 PROBLEM — K92.2 UPPER GI BLEED: Status: ACTIVE | Noted: 2022-08-02

## 2022-08-02 LAB
ABO/RH: NORMAL
ANION GAP SERPL CALCULATED.3IONS-SCNC: 12 MMOL/L (ref 3–16)
ANTIBODY SCREEN: NORMAL
BUN BLDV-MCNC: 35 MG/DL (ref 7–20)
CALCIUM SERPL-MCNC: 8.4 MG/DL (ref 8.3–10.6)
CHLORIDE BLD-SCNC: 117 MMOL/L (ref 99–110)
CO2: 23 MMOL/L (ref 21–32)
CREAT SERPL-MCNC: 0.6 MG/DL (ref 0.8–1.3)
EKG ATRIAL RATE: 95 BPM
EKG DIAGNOSIS: NORMAL
EKG P AXIS: 43 DEGREES
EKG P-R INTERVAL: 156 MS
EKG Q-T INTERVAL: 396 MS
EKG QRS DURATION: 132 MS
EKG QTC CALCULATION (BAZETT): 497 MS
EKG R AXIS: 72 DEGREES
EKG T AXIS: 31 DEGREES
EKG VENTRICULAR RATE: 95 BPM
GFR AFRICAN AMERICAN: >60
GFR NON-AFRICAN AMERICAN: >60
GLUCOSE BLD-MCNC: 119 MG/DL (ref 70–99)
HCT VFR BLD CALC: 27.2 % (ref 40.5–52.5)
HCT VFR BLD CALC: 27.5 % (ref 40.5–52.5)
HCT VFR BLD CALC: 28.6 % (ref 40.5–52.5)
HEMOGLOBIN: 8.7 G/DL (ref 13.5–17.5)
HEMOGLOBIN: 8.8 G/DL (ref 13.5–17.5)
HEMOGLOBIN: 9.1 G/DL (ref 13.5–17.5)
MAGNESIUM: 2.4 MG/DL (ref 1.8–2.4)
MCH RBC QN AUTO: 29.5 PG (ref 26–34)
MCHC RBC AUTO-ENTMCNC: 31.9 G/DL (ref 31–36)
MCV RBC AUTO: 92.3 FL (ref 80–100)
OSMOLALITY URINE: 482 MOSM/KG (ref 390–1070)
PDW BLD-RTO: 19.5 % (ref 12.4–15.4)
PLATELET # BLD: 388 K/UL (ref 135–450)
PMV BLD AUTO: 7.7 FL (ref 5–10.5)
POTASSIUM REFLEX MAGNESIUM: 3.3 MMOL/L (ref 3.5–5.1)
RBC # BLD: 2.98 M/UL (ref 4.2–5.9)
REASON FOR REJECTION: NORMAL
REJECTED TEST: NORMAL
SODIUM BLD-SCNC: 151 MMOL/L (ref 136–145)
SODIUM BLD-SCNC: 152 MMOL/L (ref 136–145)
SODIUM URINE: <20 MMOL/L
URINE CULTURE, ROUTINE: NORMAL
WBC # BLD: 9.7 K/UL (ref 4–11)

## 2022-08-02 PROCEDURE — 7100000010 HC PHASE II RECOVERY - FIRST 15 MIN: Performed by: INTERNAL MEDICINE

## 2022-08-02 PROCEDURE — 96375 TX/PRO/DX INJ NEW DRUG ADDON: CPT

## 2022-08-02 PROCEDURE — 85014 HEMATOCRIT: CPT

## 2022-08-02 PROCEDURE — A4216 STERILE WATER/SALINE, 10 ML: HCPCS | Performed by: STUDENT IN AN ORGANIZED HEALTH CARE EDUCATION/TRAINING PROGRAM

## 2022-08-02 PROCEDURE — 6360000002 HC RX W HCPCS

## 2022-08-02 PROCEDURE — 7100000011 HC PHASE II RECOVERY - ADDTL 15 MIN: Performed by: INTERNAL MEDICINE

## 2022-08-02 PROCEDURE — 0DB68ZX EXCISION OF STOMACH, VIA NATURAL OR ARTIFICIAL OPENING ENDOSCOPIC, DIAGNOSTIC: ICD-10-PCS | Performed by: INTERNAL MEDICINE

## 2022-08-02 PROCEDURE — 85018 HEMOGLOBIN: CPT

## 2022-08-02 PROCEDURE — 3609012400 HC EGD TRANSORAL BIOPSY SINGLE/MULTIPLE: Performed by: INTERNAL MEDICINE

## 2022-08-02 PROCEDURE — 94664 DEMO&/EVAL PT USE INHALER: CPT

## 2022-08-02 PROCEDURE — 96376 TX/PRO/DX INJ SAME DRUG ADON: CPT

## 2022-08-02 PROCEDURE — 2580000003 HC RX 258: Performed by: NURSE ANESTHETIST, CERTIFIED REGISTERED

## 2022-08-02 PROCEDURE — C9113 INJ PANTOPRAZOLE SODIUM, VIA: HCPCS | Performed by: STUDENT IN AN ORGANIZED HEALTH CARE EDUCATION/TRAINING PROGRAM

## 2022-08-02 PROCEDURE — 2500000003 HC RX 250 WO HCPCS: Performed by: NURSE ANESTHETIST, CERTIFIED REGISTERED

## 2022-08-02 PROCEDURE — 2580000003 HC RX 258: Performed by: INTERNAL MEDICINE

## 2022-08-02 PROCEDURE — 2580000003 HC RX 258: Performed by: STUDENT IN AN ORGANIZED HEALTH CARE EDUCATION/TRAINING PROGRAM

## 2022-08-02 PROCEDURE — 86850 RBC ANTIBODY SCREEN: CPT

## 2022-08-02 PROCEDURE — 96374 THER/PROPH/DIAG INJ IV PUSH: CPT

## 2022-08-02 PROCEDURE — 3700000000 HC ANESTHESIA ATTENDED CARE: Performed by: INTERNAL MEDICINE

## 2022-08-02 PROCEDURE — 36415 COLL VENOUS BLD VENIPUNCTURE: CPT

## 2022-08-02 PROCEDURE — 96366 THER/PROPH/DIAG IV INF ADDON: CPT

## 2022-08-02 PROCEDURE — 6360000002 HC RX W HCPCS: Performed by: STUDENT IN AN ORGANIZED HEALTH CARE EDUCATION/TRAINING PROGRAM

## 2022-08-02 PROCEDURE — 1200000000 HC SEMI PRIVATE

## 2022-08-02 PROCEDURE — 84300 ASSAY OF URINE SODIUM: CPT

## 2022-08-02 PROCEDURE — 70450 CT HEAD/BRAIN W/O DYE: CPT

## 2022-08-02 PROCEDURE — 96365 THER/PROPH/DIAG IV INF INIT: CPT

## 2022-08-02 PROCEDURE — 83735 ASSAY OF MAGNESIUM: CPT

## 2022-08-02 PROCEDURE — 3700000001 HC ADD 15 MINUTES (ANESTHESIA): Performed by: INTERNAL MEDICINE

## 2022-08-02 PROCEDURE — 88305 TISSUE EXAM BY PATHOLOGIST: CPT

## 2022-08-02 PROCEDURE — 2580000003 HC RX 258

## 2022-08-02 PROCEDURE — 6360000002 HC RX W HCPCS: Performed by: INTERNAL MEDICINE

## 2022-08-02 PROCEDURE — 86900 BLOOD TYPING SEROLOGIC ABO: CPT

## 2022-08-02 PROCEDURE — 83935 ASSAY OF URINE OSMOLALITY: CPT

## 2022-08-02 PROCEDURE — 80048 BASIC METABOLIC PNL TOTAL CA: CPT

## 2022-08-02 PROCEDURE — 84295 ASSAY OF SERUM SODIUM: CPT

## 2022-08-02 PROCEDURE — 86901 BLOOD TYPING SEROLOGIC RH(D): CPT

## 2022-08-02 PROCEDURE — 96361 HYDRATE IV INFUSION ADD-ON: CPT

## 2022-08-02 PROCEDURE — 2709999900 HC NON-CHARGEABLE SUPPLY: Performed by: INTERNAL MEDICINE

## 2022-08-02 PROCEDURE — 6370000000 HC RX 637 (ALT 250 FOR IP)

## 2022-08-02 PROCEDURE — 85027 COMPLETE CBC AUTOMATED: CPT

## 2022-08-02 PROCEDURE — 6360000002 HC RX W HCPCS: Performed by: NURSE ANESTHETIST, CERTIFIED REGISTERED

## 2022-08-02 RX ORDER — PANTOPRAZOLE SODIUM 40 MG/10ML
40 INJECTION, POWDER, LYOPHILIZED, FOR SOLUTION INTRAVENOUS ONCE
Status: COMPLETED | OUTPATIENT
Start: 2022-08-02 | End: 2022-08-02

## 2022-08-02 RX ORDER — POTASSIUM CHLORIDE 7.45 MG/ML
10 INJECTION INTRAVENOUS
Status: DISPENSED | OUTPATIENT
Start: 2022-08-02 | End: 2022-08-02

## 2022-08-02 RX ORDER — M-VIT,TX,IRON,MINS/CALC/FOLIC 27MG-0.4MG
1 TABLET ORAL DAILY
COMMUNITY

## 2022-08-02 RX ORDER — POTASSIUM CHLORIDE 20 MEQ/1
40 TABLET, EXTENDED RELEASE ORAL ONCE
Status: COMPLETED | OUTPATIENT
Start: 2022-08-02 | End: 2022-08-02

## 2022-08-02 RX ORDER — SODIUM CHLORIDE FOR INHALATION 0.9 %
3 VIAL, NEBULIZER (ML) INHALATION DAILY
COMMUNITY

## 2022-08-02 RX ORDER — SODIUM CHLORIDE, SODIUM LACTATE, POTASSIUM CHLORIDE, AND CALCIUM CHLORIDE .6; .31; .03; .02 G/100ML; G/100ML; G/100ML; G/100ML
1000 INJECTION, SOLUTION INTRAVENOUS ONCE
Status: COMPLETED | OUTPATIENT
Start: 2022-08-02 | End: 2022-08-02

## 2022-08-02 RX ORDER — ACETAMINOPHEN 325 MG/1
650 TABLET ORAL EVERY 6 HOURS PRN
Status: DISCONTINUED | OUTPATIENT
Start: 2022-08-02 | End: 2022-08-03 | Stop reason: HOSPADM

## 2022-08-02 RX ORDER — SENNA PLUS 8.6 MG/1
1 TABLET ORAL 2 TIMES DAILY
COMMUNITY

## 2022-08-02 RX ORDER — ACETAMINOPHEN 650 MG/1
650 SUPPOSITORY RECTAL EVERY 6 HOURS PRN
Status: DISCONTINUED | OUTPATIENT
Start: 2022-08-02 | End: 2022-08-03 | Stop reason: HOSPADM

## 2022-08-02 RX ORDER — SODIUM CHLORIDE 9 MG/ML
INJECTION, SOLUTION INTRAVENOUS ONCE
Status: COMPLETED | OUTPATIENT
Start: 2022-08-02 | End: 2022-08-02

## 2022-08-02 RX ORDER — SODIUM CHLORIDE 0.9 % (FLUSH) 0.9 %
5-40 SYRINGE (ML) INJECTION EVERY 12 HOURS SCHEDULED
Status: DISCONTINUED | OUTPATIENT
Start: 2022-08-02 | End: 2022-08-03 | Stop reason: HOSPADM

## 2022-08-02 RX ORDER — SODIUM CHLORIDE 9 MG/ML
INJECTION, SOLUTION INTRAVENOUS CONTINUOUS
Status: DISCONTINUED | OUTPATIENT
Start: 2022-08-02 | End: 2022-08-02

## 2022-08-02 RX ORDER — DEXTROSE AND SODIUM CHLORIDE 5; .45 G/100ML; G/100ML
INJECTION, SOLUTION INTRAVENOUS CONTINUOUS
Status: DISCONTINUED | OUTPATIENT
Start: 2022-08-02 | End: 2022-08-03 | Stop reason: HOSPADM

## 2022-08-02 RX ORDER — ONDANSETRON 4 MG/1
4 TABLET, ORALLY DISINTEGRATING ORAL EVERY 8 HOURS PRN
Status: DISCONTINUED | OUTPATIENT
Start: 2022-08-02 | End: 2022-08-02

## 2022-08-02 RX ORDER — FUROSEMIDE 20 MG/1
20 TABLET ORAL 2 TIMES DAILY
Status: ON HOLD | COMMUNITY
End: 2022-08-03 | Stop reason: HOSPADM

## 2022-08-02 RX ORDER — POTASSIUM CHLORIDE 7.45 MG/ML
10 INJECTION INTRAVENOUS ONCE
Status: COMPLETED | OUTPATIENT
Start: 2022-08-02 | End: 2022-08-02

## 2022-08-02 RX ORDER — SODIUM CHLORIDE 9 MG/ML
INJECTION, SOLUTION INTRAVENOUS PRN
Status: DISCONTINUED | OUTPATIENT
Start: 2022-08-02 | End: 2022-08-03 | Stop reason: HOSPADM

## 2022-08-02 RX ORDER — ONDANSETRON 2 MG/ML
4 INJECTION INTRAMUSCULAR; INTRAVENOUS EVERY 6 HOURS PRN
Status: DISCONTINUED | OUTPATIENT
Start: 2022-08-02 | End: 2022-08-02

## 2022-08-02 RX ORDER — SODIUM CHLORIDE, SODIUM LACTATE, POTASSIUM CHLORIDE, CALCIUM CHLORIDE 600; 310; 30; 20 MG/100ML; MG/100ML; MG/100ML; MG/100ML
INJECTION, SOLUTION INTRAVENOUS CONTINUOUS PRN
Status: DISCONTINUED | OUTPATIENT
Start: 2022-08-02 | End: 2022-08-02 | Stop reason: SDUPTHER

## 2022-08-02 RX ORDER — PROPOFOL 10 MG/ML
INJECTION, EMULSION INTRAVENOUS PRN
Status: DISCONTINUED | OUTPATIENT
Start: 2022-08-02 | End: 2022-08-02 | Stop reason: SDUPTHER

## 2022-08-02 RX ORDER — GLYCOPYRROLATE 0.2 MG/ML
INJECTION INTRAMUSCULAR; INTRAVENOUS PRN
Status: DISCONTINUED | OUTPATIENT
Start: 2022-08-02 | End: 2022-08-02 | Stop reason: SDUPTHER

## 2022-08-02 RX ORDER — ALBUTEROL SULFATE 2.5 MG/3ML
2.5 SOLUTION RESPIRATORY (INHALATION) EVERY 4 HOURS PRN
Status: DISCONTINUED | OUTPATIENT
Start: 2022-08-02 | End: 2022-08-03 | Stop reason: HOSPADM

## 2022-08-02 RX ORDER — LIDOCAINE HYDROCHLORIDE 20 MG/ML
INJECTION, SOLUTION INFILTRATION; PERINEURAL PRN
Status: DISCONTINUED | OUTPATIENT
Start: 2022-08-02 | End: 2022-08-02 | Stop reason: SDUPTHER

## 2022-08-02 RX ORDER — SODIUM CHLORIDE 0.9 % (FLUSH) 0.9 %
5-40 SYRINGE (ML) INJECTION PRN
Status: DISCONTINUED | OUTPATIENT
Start: 2022-08-02 | End: 2022-08-03 | Stop reason: HOSPADM

## 2022-08-02 RX ADMIN — POTASSIUM CHLORIDE 10 MEQ: 7.46 INJECTION, SOLUTION INTRAVENOUS at 10:22

## 2022-08-02 RX ADMIN — SODIUM CHLORIDE, PRESERVATIVE FREE 40 MG: 5 INJECTION INTRAVENOUS at 19:02

## 2022-08-02 RX ADMIN — POTASSIUM CHLORIDE 10 MEQ: 7.46 INJECTION, SOLUTION INTRAVENOUS at 11:13

## 2022-08-02 RX ADMIN — LIDOCAINE HYDROCHLORIDE 50 MG: 20 INJECTION, SOLUTION INFILTRATION; PERINEURAL at 14:09

## 2022-08-02 RX ADMIN — SODIUM CHLORIDE: 9 INJECTION, SOLUTION INTRAVENOUS at 02:14

## 2022-08-02 RX ADMIN — SODIUM CHLORIDE, SODIUM LACTATE, POTASSIUM CHLORIDE, AND CALCIUM CHLORIDE: .6; .31; .03; .02 INJECTION, SOLUTION INTRAVENOUS at 13:57

## 2022-08-02 RX ADMIN — SODIUM CHLORIDE, PRESERVATIVE FREE 40 MG: 5 INJECTION INTRAVENOUS at 06:25

## 2022-08-02 RX ADMIN — PROPOFOL 100 MCG/KG/MIN: 10 INJECTION, EMULSION INTRAVENOUS at 14:09

## 2022-08-02 RX ADMIN — POTASSIUM CHLORIDE 10 MEQ: 7.46 INJECTION, SOLUTION INTRAVENOUS at 15:59

## 2022-08-02 RX ADMIN — PANTOPRAZOLE SODIUM 40 MG: 40 INJECTION, POWDER, FOR SOLUTION INTRAVENOUS at 00:41

## 2022-08-02 RX ADMIN — DEXTROSE AND SODIUM CHLORIDE: 5; 450 INJECTION, SOLUTION INTRAVENOUS at 19:39

## 2022-08-02 RX ADMIN — GLYCOPYRROLATE 0.1 MG: 0.2 INJECTION INTRAMUSCULAR; INTRAVENOUS at 13:59

## 2022-08-02 RX ADMIN — POTASSIUM CHLORIDE 10 MEQ: 7.46 INJECTION, SOLUTION INTRAVENOUS at 09:20

## 2022-08-02 RX ADMIN — SODIUM CHLORIDE, PRESERVATIVE FREE 10 ML: 5 INJECTION INTRAVENOUS at 08:00

## 2022-08-02 RX ADMIN — SODIUM CHLORIDE, POTASSIUM CHLORIDE, SODIUM LACTATE AND CALCIUM CHLORIDE 1000 ML: 600; 310; 30; 20 INJECTION, SOLUTION INTRAVENOUS at 08:36

## 2022-08-02 RX ADMIN — SODIUM CHLORIDE, PRESERVATIVE FREE 10 ML: 5 INJECTION INTRAVENOUS at 19:48

## 2022-08-02 RX ADMIN — CEFTRIAXONE 1000 MG: 1 INJECTION, POWDER, FOR SOLUTION INTRAMUSCULAR; INTRAVENOUS at 03:12

## 2022-08-02 RX ADMIN — SODIUM CHLORIDE: 9 INJECTION, SOLUTION INTRAVENOUS at 12:54

## 2022-08-02 RX ADMIN — POTASSIUM CHLORIDE 40 MEQ: 1500 TABLET, EXTENDED RELEASE ORAL at 09:07

## 2022-08-02 NOTE — ED PROVIDER NOTES
[address] [place]          EM RESIDENT NOTE       Date of evaluation: 8/1/2022    Chief Complaint     Altered Mental Status (Pt. Presents to ED via EMS from [facility]. Family states patient is more altered than his baseline mental status. PMHx. UTIs; pt has indwelling fitzgerald catheter. )      of Present Illness     Yoselyn Camara is a 82 y.o. male PMH of HTN, HLD, paraplegia, decubitus ulcer on left inferior buttocks, anemia requiring blood transfusion who presents from [facility] via EMS for failure to thrive. Additional context is received from the patient's daughter who is at bedside to provide HPI. Patient's daughter states that for the past week or 2 he has been less focal, not eating as much and has seemed more fatigued. She notes that the patient has a sacral decubitus ulcer that is currently being cared for at [facility]. Because of the ulcer, the patient was ordered an air bed and for the past several weeks has been bedbound when he typically is able to get dressed and get up and sit in a chair. The provider caring for him felt like this may prolong healing so he has been instructed to lay on his air bed instead of sitting up in a chair. She reports over the past week or 2 he has become less responsive to his family, has not wanted to eat as much. Reports that she does not know if this is just because he is upset about not being able to get up and do the things that he typically does. She denies that he has had any known recent illnesses. The patient does have a chronic indwelling Fitzgerald catheter. In speaking with the patient, he is able to nod his head yes or no and answer my questions using short phrases. The patient denies any pain or that anything is bothering him. He denies any chest pain, shortness of breath, nausea, vomiting, abdominal pain, fever, chills. He does state that it has been several days since he has had a bowel movement.   Otherwise he has no complaints. Review of Systems     Review of Systems   Constitutional:  Positive for activity change, appetite change and fatigue. Negative for fever. HENT:  Negative for congestion, rhinorrhea, sore throat and trouble swallowing. Eyes: Negative. Respiratory:  Negative for cough, chest tightness, shortness of breath and wheezing. Cardiovascular:  Negative for chest pain, palpitations and leg swelling. Gastrointestinal:  Positive for abdominal distention and constipation. Negative for abdominal pain, blood in stool, diarrhea, nausea and vomiting. Endocrine: Negative. Genitourinary:  Positive for penile discharge. Negative for dysuria, flank pain, penile pain, scrotal swelling and urgency. Musculoskeletal:  Positive for arthralgias. Skin: Negative. Neurological:  Negative for dizziness, weakness, light-headedness, numbness and headaches. Hematological: Negative. Psychiatric/Behavioral:          Depressed mood       Past Medical, Surgical, Family, and Social History     He has a past medical history of Arthritis, CAD (coronary artery disease), History of blood transfusion, Hx of blood clots, Hyperlipidemia, Hypertension, Paraplegic spinal paralysis ([facility] [address] Prostate cancer ([facility] [address] and Wears glasses. 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 His family history includes Alzheimer's Disease in his mother; Heart Disease in his father. He reports that he quit smoking about 46 years ago. He has never used smokeless tobacco. He reports that he does not currently use alcohol. He reports that he does not use drugs.     Medications     Previous Medications    ACETAMINOPHEN (TYLENOL) 325 MG TABLET    Take 325 mg by mouth 2 times daily     ALBUTEROL SULFATE  (90 BASE) MCG/ACT INHALER    Inhale 2 puffs into the lungs as needed for Wheezing    ASPIRIN 81 MG TABLET    Take 81 mg by mouth daily    CYANOCOBALAMIN (VITAMIN B 12 PO)    Take by mouth    FERROUS SULFATE (IRON 325) 325 (65 FE) MG TABLET    Take 325 mg by mouth daily (with breakfast)    FUROSEMIDE (LASIX) 40 MG TABLET    Take 1.5 tablets by mouth 2 times daily    LEVOFLOXACIN (LEVAQUIN) 750 MG TABLET    Take 750 mg by mouth daily    LOVASTATIN (MEVACOR) 40 MG TABLET    Take 40 mg by mouth nightly    OLMESARTAN-HYDROCHLOROTHIAZIDE (BENICAR HCT) 20-12.5 MG PER TABLET    Take 1 tablet by mouth daily    OMEPRAZOLE (PRILOSEC) 20 MG DELAYED RELEASE CAPSULE    Take 40 mg by mouth Daily    POTASSIUM CHLORIDE (KLOR-CON M) 20 MEQ EXTENDED RELEASE TABLET    Take 1 tablet by mouth 2 times daily    TIOTROPIUM (SPIRIVA) 18 MCG INHALATION CAPSULE    Inhale 18 mcg into the lungs daily       Allergies     He has No Known Allergies. Physical Exam     INITIAL VITALS: BP: 125/74, Temp: 99.4 °F (37.4 °C), Heart Rate: 87, Resp: 16, SpO2: 97 %   Physical Exam  Constitutional:       General: He is not in acute distress. Appearance: He is well-developed. He is not ill-appearing or toxic-appearing. Comments: Chronically ill-appearing. HENT:      Head: Normocephalic and atraumatic. Mouth/Throat:      Mouth: Mucous membranes are moist.   Eyes:      Extraocular Movements: Extraocular movements intact. Pupils: Pupils are equal, round, and reactive to light. Cardiovascular:      Rate and Rhythm: Normal rate. Rhythm irregular. Heart sounds: No murmur heard. No friction rub. No gallop. Pulmonary:      Effort: Pulmonary effort is normal. No respiratory distress. Breath sounds: Rhonchi present. No wheezing or rales. Abdominal:      General: There is distension. Palpations: Abdomen is soft. Tenderness: There is no abdominal tenderness. Musculoskeletal:         General: No swelling or tenderness. Cervical back: Normal range of motion and neck supple. Comments: Right AKA   Skin:     General: Skin is warm and dry. Capillary Refill: Capillary refill takes less than 2 seconds. Comments: Decubitus ulcer noted to the left buttocks, foul odor, small amount of purulent drainage. Neurological:      Mental Status: He is alert. Mental status is at baseline. Psychiatric:         Mood and Affect: Mood is depressed. Behavior: Behavior normal.       DiagnosticResults     EKG   Interpreted in conjunction with emergencydepartment physician Christine Carmona MD  Rhythm: Sinus rhythm with occasional PVCs and PACs  Rate: normal  Axis: left  Ectopy: premature ventricular contractions (unifocal) and premature atrial contractions  Conduction: right bundle branch block (complete) and prolonged QTC  ST Segments: Difficult ascertain as there is wandering of the baseline  T Waves:no acute change  Q Waves: nonspecific  Clinical Impression: no acute changes  Comparison: Stable compared to prior on 4/24/2021    RADIOLOGY:  XR CHEST PORTABLE   Final Result      No acute pulmonary disease.          CT HEAD WO CONTRAST    (Results Pending)       LABS:   Results for orders placed or performed during the hospital encounter of 08/01/22   CBC with Auto Differential   Result Value Ref Range    WBC 10.9 4.0 - 11.0 K/uL    RBC 3.03 (L) 4.20 - 5.90 M/uL    Hemoglobin 8.9 (L) 13.5 - 17.5 g/dL    Hematocrit 27.8 (L) 40.5 - 52.5 %    MCV 91.9 80.0 - 100.0 fL    MCH 29.4 26.0 - 34.0 pg    MCHC 32.0 31.0 - 36.0 g/dL    RDW 19.7 (H) 12.4 - 15.4 %    Platelets 805 806 - 496 K/uL    MPV 7.5 5.0 - 10.5 fL    Neutrophils % 75.7 %    Lymphocytes % 12.4 %    Monocytes % 10.3 %    Eosinophils % 1.0 %    Basophils % 0.6 %    Neutrophils Absolute 8.2 (H) 1.7 - 7.7 K/uL    Lymphocytes Absolute 1.3 1.0 - 5.1 K/uL    Monocytes Absolute 1.1 0.0 - 1.3 K/uL    Eosinophils Absolute 0.1 0.0 - 0.6 K/uL    Basophils Absolute 0.1 0.0 - 0.2 K/uL   BMP   Result Value Ref Range    Sodium 149 (H) 136 - 145 mmol/L    Potassium 3.5 3.5 - 5.1 mmol/L    Chloride 115 (H) 99 - 110 mmol/L    CO2 20 (L) 21 - 32 mmol/L    Anion Gap 14 3 - 16    Glucose 134 (H) 70 - 99 mg/dL    BUN 44 (H) 7 - 20 mg/dL    Creatinine 0.7 (L) 0.8 - 1.3 mg/dL    GFR Non-African American >60 >60    GFR African American >60 >60    Calcium 9.1 8.3 - 10.6 mg/dL   Urinalysis   Result Value Ref Range    Color, UA Yellow Straw/Yellow    Clarity, UA Clear Clear    Glucose, Ur Negative Negative mg/dL    Bilirubin Urine Negative Negative    Ketones, Urine Negative Negative mg/dL    Specific Gravity, UA 1.020 1.005 - 1.030    Blood, Urine SMALL (A) Negative    pH, UA 6.0 5.0 - 8.0    Protein, UA TRACE (A) Negative mg/dL    Urobilinogen, Urine 0.2 <2.0 E.U./dL    Nitrite, Urine POSITIVE (A) Negative    Leukocyte Esterase, Urine LARGE (A) Negative    Microscopic Examination YES     Urine Type Suprapubic    Microscopic Urinalysis   Result Value Ref Range    Mucus, UA 2+ (A) None Seen /LPF    WBC, UA 3-5 0 - 5 /HPF    RBC, UA 0-2 0 - 4 /HPF    Bacteria, UA 2+ (A) None Seen /HPF       ED BEDSIDE ULTRASOUND:  No results found. RECENT VITALS:  BP: (!) 155/114, Temp: 99.4 °F (37.4 °C), Heart Rate: 97,Resp: 16, SpO2: 96 %     Procedures     None    ED Course     Nursing Notes, Past Medical Hx, Past Surgical Hx, Social Hx, Allergies, and Family Hx were reviewed. The patient was given the followingmedications:  Orders Placed This Encounter   Medications    lactated ringers bolus    pantoprazole (PROTONIX) injection 40 mg     CONSULTS:  Mk 26 / ASSESSMENT / Davey Kawasaki is a 82 y.o. male HTN, HLD, paraplegia, decubitus ulcer, anemia requiring transfusion who presented to the emergency department with failure to thrive and likely dehydration. On exam, the patient was able to nod yes and no to basic questions. Treated in the ED with a liter of IV fluids.   On physical examination, we will the patient to look at his decubitus ulcer and he was noted to have some stool. On further examination the patient appeared to be impacted. I performed manual disimpaction at the bedside and removed a large amount of hard black-colored stool. The patient's decubitus ulcer had a foul smell with a minimal amount of purulent looking drainage. A wet-to-dry dressing was placed and the wound was covered with an ABD. CBC was without leukocytosis. The patient's H&H was noted to be decreased at 8.9 and 27.8 from his baseline of approximately 11. This finding is consistent with the patient's melanotic stool concerning for upper GI bleed. He was given a dose of Protonix in the ED. BMP revealed sodium of 149 and elevated BUN of 44 consistent with dehydration. It is possible that his BUN is also elevated due to presumed upper GI bleed. Chloride was elevated at 115 and CO2 was decreased at 20. Chest x-ray revealed no acute cardiopulmonary abnormalities. EKG showed PVCs and PACs that were unchanged from prior EKGs. Given the patient's chronic indwelling Dueñas catheter with concern for urine as a possible source of infection, a new Dueñas catheter was placed and urinalysis was obtained. This revealed nitrites and leukocytes with only 3-5 WBCs. Chart review shows a patient has had prior UTI he has had greater than 50 WBCs. Lower suspicion for occult UTI at this time however urine culture was sent. Ultimately, the patient was admitted to the medicine service for further management of dehydration and anemia secondary to presumable upper GI bleed. This patient was also evaluated by the attending physician. All care plans werediscussed and agreed upon. Clinical Impression     1. Failure to thrive in adult    2. Hypernatremia    3. Melena        Disposition     PATIENT REFERRED TO:  No follow-up provider specified.     DISCHARGE MEDICATIONS:  New Prescriptions    No medications on file       DISPOSITION Admitted 08/02/2022 12:53:01 AM      Gracy Jaime MD  Resident  08/02/22 [id number]

## 2022-08-02 NOTE — PROGRESS NOTES
Patient admitted to the floor. Alert to place and situation only. VSS. NPO. Patient have a stage 4 pressure injury on his left lower buttocks. Right AKA. Voids using fitzgerald. Delay speech but mostly nodes yes or no. Oriented to the room, staff and call system. All fall precaution in place. Will continue to monitor.

## 2022-08-02 NOTE — PROCEDURES
Endoscopy Note    Patient: Armida Browning  : 1935  Acct#:     Procedure: Esophagogastroduodenoscopy with biopsy    Date:  2022     Surgeon:  Kristal Green MD,     Referring Physician:  Julieth Matamoros MD      Preoperative Diagnosis:    H/o melena with anemia      Anesthesia:  MAC      Consent:  The patient or their legal guardian has signed an informed consent, and is aware of the potential risks, benefits, alternatives, and potential complications of this procedure. These include, but are not limited to hemorrhage, bleeding, post procedural pain, perforation, phlebitis, aspiration, hypotension, hypoxia, cardiovascular events such as arryhthmia, and possibly death. Description of Procedure: The patient was then taken to the endoscopy suite, placed in the left lateral decubitus position and the above IV sedation was administrered. The Olympus video endoscope was placed through the patient's oropharynx without difficulty to the extent of the 2nd portion of the duodenum. The patient tolerated the procedure well and was taken to the post anesthesia care unit in good condition. Complications: None  EBL: none      Findings:  Esophagus:  Visualization of the esophagus demonstrated normal mucosa. Stomach: The scope was then advanced into the stomach. Both forward and retroflexed views of the stomach were obtained. Visualization of the gastric body and antrum demonstrated mild erythema, biopsies obtained. A retroflexed exam of the gastric cardia and fundus demonstrated normal mucosa. The pylorus was patent and the scope was advanced into the duodenum. Duodenum: Visualization of the duodenal bulb and the second portion of the duodenum demonstrated normal mucosa. The scope was then withdrawn back into the stomach, it was decompressed, and the scope was completely withdrawn.     Impression:    Mild antral gastritis        Recommendations:   F/U pathology  Recommend PPI daily  Follow-up hemoglobin/hematocrit  Avoid NSAIDs  Advance diet as tolerated  Continue with symptomatic and supportive care          Drea Morin, [address] [place] [address]  [phone]

## 2022-08-02 NOTE — CARE COORDINATION
CM following: Patient was out of room for procedure when CM came to room. CM will stop back later today as time allows. Report from nurse on duty is that the patient and patient's daughter would like the patient to return to [address] [place] [address] at discharge where the patient has 24 hour private duty care. Electronically signed by NAVARRO Stout on 8/2/2022 at 1:33 PM  [phone]    UPDATE: CM met with patient's daughter, Devin Chinchilla, at bedside this PM. Patient's daughter reports that the family is pleased with services at [address] [place] [address] and would like to return there. Patient's daughter reported the only CM need as medical transportation for the patient as the patient's daughter does not have the patient's wheelchair with her at this time. CM will follow up with medical team to determine if potential for discharge today.   Electronically signed by NAVARRO Stout on 8/2/2022 at 2:41 PM  [phone]

## 2022-08-02 NOTE — PROGRESS NOTES
Progress Note    Admit Date: 8/1/2022  Day: 1  Diet: Diet NPO Exceptions are: Ice Chips    CC: AMS    Interval history:   Patient seen and examined at bedside this morning. Patient having difficulty speaking due to nausea and vomiting. Patient oriented to self and only that he is in the hospital. Patient denies any fever/chills, chest pain, shortness of breath, abdominal pain, diarrhea or constipation. HPI:   Patient is a 82 y.o. male with a PMHx of paraplegia, hypertension, hyperlipidemia, CAD, Covid 23, COPD presenting with chief complaint of altered mental status. Patient was nodding and shaking his head to my questions. History was taken from the daughter. Per daughter, patient has been having decrease in his oral intake for the past 2 weeks. Patient has also seemed more fatigue. Per daughter, patient has become less responsive for the past week. Patient has sacral ulcer and is being managed at [facility]. Due to the ulcer, patient was asked to use air mattress. Patient was asked not to sit in a chair which can delay the wound healing of his sacral ulcer. Patient does have chronic indwelling Dueñas catheter. Patient denied having fever, chest pain, nausea, vomiting, numbness, tingling but he is shaking his head. In the ED, temperature 99.4 °F, respiratory rate 16, pulse 87, blood pressure 125/74, SPO2 97% on room air. Labs are significant for sodium 149,, creatinine 0.7, hemoglobin 8.9. UA was positive for nitrite and leukocyte esterase. In the ED, patient appeared to be impacted. Manual disimpaction was performed at bedside and large amount of hard black-colored stool was seen. Patient was admitted to the hospital for altered mental status and's blood pressure blood.     Medications:     Scheduled Meds:   sodium chloride flush  5-40 mL IntraVENous 2 times per day    pantoprazole (PROTONIX) 40 mg injection  40 mg IntraVENous Q12H    cefTRIAXone (ROCEPHIN) IV  1,000 mg IntraVENous Q24H tiotropium  2 puff Inhalation Daily    potassium chloride  10 mEq IntraVENous Q1H    lactated ringers bolus  1,000 mL IntraVENous Once     Continuous Infusions:   sodium chloride      sodium chloride Stopped (08/02/22 0835)     PRN Meds:sodium chloride flush, sodium chloride, acetaminophen **OR** acetaminophen, albuterol    Objective:   Vitals:   T-max:  Patient Vitals for the past 8 hrs:   BP Temp Temp src Pulse Resp SpO2   08/02/22 0630 126/73 97.3 °F (36.3 °C) Oral 75 16 96 %   08/02/22 0145 114/69 98.2 °F (36.8 °C) Oral 78 16 96 %       Intake/Output Summary (Last 24 hours) at 8/2/2022 3496  Last data filed at 8/2/2022 0800  Gross per 24 hour   Intake 1010 ml   Output 800 ml   Net 210 ml       Review of Systems  A 10 point ROS was negative unless listed in the interval history    Physical Exam  Constitutional:       Appearance: He is ill-appearing and diaphoretic. HENT:      Head: Normocephalic. Eyes:      Extraocular Movements: Extraocular movements intact. Pupils: Pupils are equal, round, and reactive to light. Cardiovascular:      Rate and Rhythm: Normal rate and regular rhythm. Pulses: Normal pulses. Heart sounds: Normal heart sounds. No murmur heard. Pulmonary:      Effort: Pulmonary effort is normal. No respiratory distress. Breath sounds: Normal breath sounds. Abdominal:      General: Abdomen is flat. Bowel sounds are normal. There is no distension. Palpations: Abdomen is soft. Tenderness: There is no abdominal tenderness. Genitourinary:     Comments: Has Dueñas catheter  Skin:     Comments: Sacral ulcer   Neurological:      Mental Status: He is alert. Comments: Patient only oriented to self and that he is in the hospital  Right lower extremity AKA. Patient can move bilateral upper extremities.   Decreased sensation and strength on bilateral lower extremity due to history of paraplegia        LABS:    CBC:   Recent Labs     08/01/22 2045 08/02/22  1695 08/02/22  0631   WBC 10.9  --  9.7   HGB 8.9* 8.7* 8.8*   HCT 27.8* 27.2* 27.5*     --  388   MCV 91.9  --  92.3     Renal:    Recent Labs     08/01/22  2045 08/02/22  0621   * 152*   K 3.5 3.3*   * 117*   CO2 20* 23   BUN 44* 35*   CREATININE 0.7* 0.6*   GLUCOSE 134* 119*   CALCIUM 9.1 8.4   MG  --  2.40   ANIONGAP 14 12     Hepatic: No results for input(s): AST, ALT, BILITOT, BILIDIR, PROT, LABALBU, ALKPHOS in the last 72 hours. Troponin: No results for input(s): TROPONINI in the last 72 hours. BNP: No results for input(s): BNP in the last 72 hours. Lipids: No results for input(s): CHOL, HDL in the last 72 hours. Invalid input(s): LDLCALCU, TRIGLYCERIDE  ABGs:  No results for input(s): PHART, ZCL5GMZ, PO2ART, TWB7QUR, BEART, THGBART, X3IKYVNO, NFD4IGQ in the last 72 hours. INR: No results for input(s): INR in the last 72 hours. Lactate: No results for input(s): LACTATE in the last 72 hours. Cultures:  -----------------------------------------------------------------  RAD:   CT HEAD WO CONTRAST   Final Result      1. No acute intracranial process. 2.  Stable moderate to severe cerebral atrophy and chronic small vessel ischemic disease. XR CHEST PORTABLE   Final Result      No acute pulmonary disease. Assessment/Plan:   Susannah Aguilar is a 82 y.o. male with PMHx significant for paraplegia, hypertension, hyperlipidemia, CAD, Covid 23, COPD presenting with chief complaint of altered mental status. Acute metabolic encephalopathy likely 2/2 to UTI  -Patient presented with altered mental status. Patient has been having decreased oral intake and fatigue. -CT head showed no acute intracranial abnormalities. Chest x-ray showed no acute pulmonary disease. -UA was positive for leukocyte esterase and nitrite. Patient is on chronic Dueñas catheter.  -Follow-up urine culture.   -Ceftriaxone     Acute blood loss anemia suspected acute GI bleed  -Secondary to GI bleed.  -Patient presented with hemoglobin of 8.9. Baseline hemoglobin is between 11-12.  -In the ED, manual disimpaction was performed at bedside and large amount of hard black-colored stool was seen. -Protonix 40 mg twice daily  -N.p.o.  -IV fluid  -Holding aspirin  -GI consulted, plan for EGD today     Hypernatremia:  Patient presented with a Na of 149, Na is 152 this morning. Patient has a water deficit of 3.6L  -IV fluid  -LR     COPD:  -Continue home Spiriva and albuterol     Hypertension:  -Holding home blood pressure medication.      CAD:  -Holding aspirin    Code Status: Full code  FEN: NPO  PPX: SCDs  DISPO:     Saratha Osgood, MD, PGY-1  08/02/22  9:39 AM    This patient has been staffed and discussed with Vicky Dockery MD.

## 2022-08-02 NOTE — ED PROVIDER NOTES
ED Attending Attestation Note     Date of evaluation: 8/1/2022    This patient was seen by the resident. I have seen and examined the patient, agree with the workup, evaluation, management and diagnosis. The care plan has been discussed. I have reviewed the ECG and concur with the resident's interpretation. My assessment reveals paraplegic patient with failure to thrive from [facility]. Patient does have a decubitus ulcer that he is getting treatment for at the wound center at [facility] but now has been exhibiting symptoms of failure to thrive. Work-up remarkable for hypernatremia, nitrite positive urine with large leukocyte esterase. May have UGIB given dark stool and elevated BUN. Will admit for further management.      Christine Carmona MD  08/02/22 8310

## 2022-08-02 NOTE — CONSULTS
[facility] Continence Nurse  Consult Note       NAME:  Sue Hodgson RECORD NUMBER:  [id number]  AGE: 82 y.o. GENDER: male  : 1935  TODAY'S DATE:  2022    Subjective   Reason for WOCN Evaluation and Assessment: R Ischium - Stage 4      Jarvis Harper is a 82 y.o. male referred by:   [] Physician  [x] Nursing  [] Other:     Wound Identification:  Wound Type: pressure  Contributing Factors: chronic pressure, decreased mobility, shear force, and paraplegic    Wound History: Patient is a 82 y.o. male with a PMHx of paraplegia, hypertension, hyperlipidemia, CAD, Covid 23, COPD presenting with chief complaint of altered mental status. Patient was nodding and shaking his head to my questions. History was taken from the daughter. Per daughter, patient has been having decrease in his oral intake for the past 2 weeks. Patient has also seemed more fatigue. Per daughter, patient has become less responsive for the past week. Patient has sacral ulcer and is being managed at [facility]. Due to the ulcer, patient was asked to use air mattress. Patient was asked not to sit in a chair which can delay the wound healing of his sacral ulcer. Patient does have chronic indwelling Dueñas catheter. Patient denied having fever, chest pain, nausea, vomiting, numbness, tingling but he is shaking his head. In the ED, temperature 99.4 °F, respiratory rate 16, pulse 87, blood pressure 125/74, SPO2 97% on room air. Labs are significant for sodium 149,, creatinine 0.7, hemoglobin 8.9. UA was positive for nitrite and leukocyte esterase. In the ED, patient appeared to be impacted. Manual disimpaction was performed at bedside and large amount of hard black-colored stool was seen. Patient was admitted to the hospital for altered mental status and's blood pressure blood.   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    Prostate cancer ([facility]     Wears glasses        PAST SURGICAL HISTORY    Past Surgical History:   Procedure Laterality Date    ABOVE KNEE AMPUTATION Right 2020    BACK SURGERY      for spinal cord aneurysm rupture    CARDIAC SURGERY      quadruple bypass    FOOT DEBRIDEMENT Left 3/10/2020    LEFT FOOT- SHARP EXCISIONAL DEBRIDEMENT DOWN TO, THROUGH AND INCLUDING THE LAYERS OF THE DEEP FASCIA AND TENDON, SECOND DIGIT, POSSIBLE SAUCERIZATION BONE PROXIMAL PHALANX, APPLICATION SKIN GRAFT SUBSTITUTE LEFT FOOT performed by Malena Abdalla DPM at [address] N/A 2020    EGD BIOPSY performed by Ermelinda Morris MD at [address] [place] [address]    Family History   Problem Relation Age of Onset    Alzheimer's Disease Mother     Heart Disease Father        SOCIAL HISTORY    Social History     Tobacco Use    Smoking status: Former     Types: Cigarettes     Quit date: 3/9/1976     Years since quittin.4    Smokeless tobacco: Never   Vaping Use    Vaping Use: Never used   Substance Use Topics    Alcohol use: Not Currently    Drug use: No       ALLERGIES    No Known Allergies    MEDICATIONS    No current facility-administered medications on file prior to encounter.      Current Outpatient Medications on File Prior to Encounter   Medication Sig Dispense Refill    potassium chloride (KLOR-CON M) 20 MEQ extended release tablet Take 1 tablet by mouth 2 times daily 90 tablet 1    furosemide (LASIX) 40 MG tablet Take 1.5 tablets by mouth 2 times daily (Patient taking differently: Take 60 mg by mouth 2 times daily 60 mg BID 21 per dr. Emilia Dasilva) 180 tablet 3    omeprazole (PRILOSEC) 20 MG delayed release capsule Take 40 mg by mouth Daily      tiotropium (SPIRIVA) 18 MCG inhalation capsule Inhale 18 mcg into the lungs daily      levoFLOXacin (LEVAQUIN) 750 MG tablet Take 750 mg by mouth daily      Cyanocobalamin (VITAMIN B 12 PO) Take by mouth      olmesartan-hydroCHLOROthiazide (BENICAR HCT) 20-12.5 MG per tablet Take 1 tablet by mouth daily (Patient taking differently: Take 0.5 tablets by mouth daily taking) 90 tablet 3    ferrous sulfate (IRON 325) 325 (65 Fe) MG tablet Take 325 mg by mouth daily (with breakfast)      acetaminophen (TYLENOL) 325 MG tablet Take 325 mg by mouth 2 times daily       lovastatin (MEVACOR) 40 MG tablet Take 40 mg by mouth nightly      aspirin 81 MG tablet Take 81 mg by mouth daily      albuterol sulfate  (90 BASE) MCG/ACT inhaler Inhale 2 puffs into the lungs as needed for Wheezing         Objective    /73   Pulse 75   Temp 97.3 °F (36.3 °C) (Oral)   Resp 16   Wt 187 lb 9.6 oz (85.1 kg)   SpO2 96%   BMI 26.92 kg/m²     LABS:  WBC:    Lab Results   Component Value Date/Time    WBC 9.7 08/02/2022 06:31 AM     H/H:    Lab Results   Component Value Date/Time    HGB 8.8 08/02/2022 06:31 AM    HCT 27.5 08/02/2022 06:31 AM     PTT:  No results found for: APTT, PTT[APTT}  PT/INR:    Lab Results   Component Value Date/Time    PROTIME 12.7 04/24/2021 08:51 PM    INR 1.09 04/24/2021 08:51 PM     HgBA1c:  No results found for: LABA1C    Assessment: R Ischium - small area with slough, 90% of wound bed red.  Periwound blanchable erythema   Isaias Risk Score: Isaias Scale Score: 13    Patient Active Problem List   Diagnosis Code    Sepsis ([facility] A41.9    High anion gap metabolic acidosis X58.5    Hypokalemia E87.6    Elevated lactic acid level W19.94    Acute metabolic encephalopathy B67.39    Generalized weakness R53.1    Abdominal aortic aneurysm (AAA) without rupture (HCC) I71.4    Coronary artery disease involving native coronary artery of native heart without angina pectoris I25.10    Essential hypertension I10    Mixed hyperlipidemia E78.2    Paraplegia ([facility]) G82.20    Ulcer of left foot, with necrosis of muscle ([facility]) L97.523    Upper GI bleed K92.2       Measurements:  Wound 08/02/22 Ischium Right (Active)   Wound Image   08/02/22 1025   Wound Etiology Pressure Stage 4 08/02/22 1025   Dressing Status Clean; Intact; New dressing applied 08/02/22 1025   Wound Cleansed Cleansed with saline 08/02/22 1025   Dressing/Treatment Alginate with Ag;Moist to dry; Foam 08/02/22 1025   Dressing Change Due 08/03/22 08/02/22 1025   Wound Length (cm) 4.9 cm 08/02/22 1025   Wound Width (cm) 2.8 cm 08/02/22 1025   Wound Depth (cm) 4.5 cm 08/02/22 1025   Wound Surface Area (cm^2) 13.72 cm^2 08/02/22 1025   Wound Volume (cm^3) 61.74 cm^3 08/02/22 1025   Undermining Starts ___ O'Clock 12 08/02/22 1025   Undermining Ends___ O'Clock 2 08/02/22 1025   Undermining Maxium Distance (cm) 6.6 08/02/22 1025   Wound Assessment Saxon/red;Slough 08/02/22 1025   Drainage Amount Scant 08/02/22 1025   Drainage Description Serosanguinous 08/02/22 1025   Odor None 08/02/22 1025   Rosa-wound Assessment Blanchable erythema 08/02/22 1025   Margins Defined edges 08/02/22 1025   Number of days: 0   R Ischium:      Response to treatment:  Well tolerated by patient.      Pain Assessment:  Severity:  0 / 10  Quality of pain: N/A  Wound Pain Timing/Severity: none  Premedicated: No    Plan   Plan of Care: Wound 08/02/22 Ischium Right-Dressing/Treatment: Alginate with Ag, Moist to dry, Foam  Recommendation: R Ischium - clean with NS, place piece of alginate ag (MaxOrb) on wound bed then moisten gauze, cover with foam dressing, change daily  Reposition pt every 2 hours  Call Wound Care for deterioration [phone]    Specialty Bed Required : Yes   [x] Low Air Loss   [x] Pressure Redistribution  [] Fluid Immersion  [] Bariatric  [] Total Pressure Relief  [] Other:     Current Diet: Diet NPO Exceptions are: Ice Chips  Dietician consult:  No    Discharge Plan:  Placement for patient upon discharge: skilled nursing    Patient appropriate for Outpatient [address] [facility] [address]: Yes    Referrals:  [x]   [] [facility]  [] Supplies  [] Other    Patient/Caregiver Teaching:  Level of patient/caregiver understanding able to:   [] Indicates understanding       [] Needs reinforcement  [] Unsuccessful      [] Verbal Understanding  [] Demonstrated understanding       [] No evidence of learning  [] Refused teaching         [] N/A       Electronically signed by Alaina Ramsey RN, CWOCN on 8/2/2022 at 10:26 AM

## 2022-08-02 NOTE — ANESTHESIA POSTPROCEDURE EVALUATION
Department of Anesthesiology  Postprocedure Note    Patient: Andrew Jimenez  MRN: [id number]  YOB: 1935  Date of evaluation: 8/2/2022      Procedure Summary     Date: 08/02/22 Room / Location: [facility]    Anesthesia Start: 0997 Anesthesia Stop: 7620    Procedure: EGD BIOPSY Diagnosis:       Melena      (melena)    Surgeons: Aisha Dao MD Responsible Provider: Anyi Cedillo DO    Anesthesia Type: MAC ASA Status: 4          Anesthesia Type: No value filed.     Radha Phase I: Radha Score: 9    Radha Phase II:        Anesthesia Post Evaluation    Patient location during evaluation: PACU  Patient participation: complete - patient participated  Level of consciousness: awake and alert  Pain score: 0  Airway patency: patent  Nausea & Vomiting: no nausea and no vomiting  Complications: no  Cardiovascular status: hemodynamically stable  Respiratory status: acceptable  Hydration status: stable

## 2022-08-02 NOTE — ED NOTES
Disimpaction done by MD with RN at bedside. Pt tolerated well.  New wet to dry dressing applied to buttocks, linen and pad changed     Stas Hassan RN  08/02/22 0005

## 2022-08-02 NOTE — RT PROTOCOL NOTE
RT Inhaler-Nebulizer Bronchodilator Protocol Note    There is a bronchodilator order in the chart from a provider indicating to follow the RT Bronchodilator Protocol and there is an Initiate RT Inhaler-Nebulizer Bronchodilator Protocol order as well (see protocol at bottom of note). CXR Findings:  XR CHEST PORTABLE    Result Date: 8/1/2022  No acute pulmonary disease. The findings from the last RT Protocol Assessment were as follows:   History Pulmonary Disease: Smoker 15 pack years or more  Respiratory Pattern: Regular pattern and RR 12-20 bpm  Breath Sounds: Clear breath sounds  Cough: Strong, spontaneous, non-productive  Indication for Bronchodilator Therapy:    Bronchodilator Assessment Score: 1    Aerosolized bronchodilator medication orders have been revised according to the RT Inhaler-Nebulizer Bronchodilator Protocol below. Respiratory Therapist to perform RT Therapy Protocol Assessment initially then follow the protocol. Repeat RT Therapy Protocol Assessment PRN for score 0-3 or on second treatment, BID, and PRN for scores above 3. No Indications - adjust the frequency to every 6 hours PRN wheezing or bronchospasm, if no treatments needed after 48 hours then discontinue using Per Protocol order mode. If indication present, adjust the RT bronchodilator orders based on the Bronchodilator Assessment Score as indicated below. Use Inhaler orders unless patient has one or more of the following: on home nebulizer, not able to hold breath for 10 seconds, is not alert and oriented, cannot activate and use MDI correctly, or respiratory rate 25 breaths per minute or more, then use the equivalent nebulizer order(s) with same Frequency and PRN reasons based on the score. If a patient is on this medication at home then do not decrease Frequency below that used at home.     0-3 - enter or revise RT bronchodilator order(s) to equivalent RT Bronchodilator order with Frequency of every 4 hours PRN for wheezing or increased work of breathing using Per Protocol order mode. 4-6 - enter or revise RT Bronchodilator order(s) to two equivalent RT bronchodilator orders with one order with BID Frequency and one order with Frequency of every 4 hours PRN wheezing or increased work of breathing using Per Protocol order mode. 7-10 - enter or revise RT Bronchodilator order(s) to two equivalent RT bronchodilator orders with one order with TID Frequency and one order with Frequency of every 4 hours PRN wheezing or increased work of breathing using Per Protocol order mode. 11-13 - enter or revise RT Bronchodilator order(s) to one equivalent RT bronchodilator order with QID Frequency and an Albuterol order with Frequency of every 4 hours PRN wheezing or increased work of breathing using Per Protocol order mode. Greater than 13 - enter or revise RT Bronchodilator order(s) to one equivalent RT bronchodilator order with every 4 hours Frequency and an Albuterol order with Frequency of every 2 hours PRN wheezing or increased work of breathing using Per Protocol order mode. RT to enter RT Home Evaluation for COPD & MDI Assessment order using Per Protocol order mode.     Electronically signed by Jj Orellana RCP on 8/2/2022 at 12:15 PM

## 2022-08-02 NOTE — ANESTHESIA PRE PROCEDURE
Department of Anesthesiology  Preprocedure Note       Name:  Tay Diaz   Age:  82 y.o.  :  1935                                          MRN:  [id number]         Date:  2022      Surgeon: Analia Ross):  Sweetie Maguire MD    Procedure: Procedure(s):  EGD ESOPHAGOGASTRODUODENOSCOPY    Medications prior to admission:   Prior to Admission medications    Medication Sig Start Date End Date Taking? Authorizing Provider   fluticasone-umeclidin-vilant (TRELEGY ELLIPTA) 100-62.5-25 MCG/INH AEPB Inhale 1 puff into the lungs daily    Historical Provider, MD   Multiple Vitamins-Minerals (THERAPEUTIC MULTIVITAMIN-MINERALS) tablet Take 1 tablet by mouth in the morning. Historical Provider, MD   Probiotic Product (PROBIOTIC DAILY PO) Take 1 capsule by mouth daily    Historical Provider, MD   sodium chloride nebulizer 0.9 % solution Take 3 mLs by nebulization in the morning. Historical Provider, MD   senna (SENOKOT) 8.6 MG tablet Take 1 tablet by mouth in the morning and 1 tablet before bedtime. Historical Provider, MD   furosemide (LASIX) 20 MG tablet Take 20 mg by mouth in the morning and 20 mg before bedtime. Historical Provider, MD   potassium chloride (KLOR-CON M) 20 MEQ extended release tablet Take 1 tablet by mouth 2 times daily 21   Shyla Turner MD   Cyanocobalamin (VITAMIN B 12 PO) Take 1 tablet by mouth daily    Historical Provider, MD   ferrous sulfate (IRON 325) 325 (65 Fe) MG tablet Take 325 mg by mouth every other day    Historical Provider, MD   lovastatin (MEVACOR) 20 MG tablet Take 20 mg by mouth nightly    Historical Provider, MD   aspirin 81 MG tablet Take 81 mg by mouth daily    Historical Provider, MD   albuterol sulfate  (90 BASE) MCG/ACT inhaler Inhale 2 puffs into the lungs as needed for Wheezing    Historical Provider, MD       Current medications:    No current facility-administered medications for this visit.      No current outpatient medications on file.     Facility-Administered Medications Ordered in Other Visits   Medication Dose Route Frequency Provider Last Rate Last Admin    sodium chloride flush 0.9 % injection 5-40 mL  5-40 mL IntraVENous 2 times per day Juan Manuel Felix MD   10 mL at 08/02/22 0800    sodium chloride flush 0.9 % injection 5-40 mL  5-40 mL IntraVENous PRN Juan Manuel Felix MD        0.9 % sodium chloride infusion   IntraVENous PRN Juan Manuel Felix MD        acetaminophen (TYLENOL) tablet 650 mg  650 mg Oral Q6H PRN Juan Manuel Felix MD        Or    acetaminophen (TYLENOL) suppository 650 mg  650 mg Rectal Q6H PRN Juan Manuel Felix MD        0.9 % sodium chloride infusion   IntraVENous Continuous Juan Manuel Felix MD   Stopped at 08/02/22 0835    pantoprazole (PROTONIX) 40 mg in sodium chloride (PF) 10 mL injection  40 mg IntraVENous Q12H Juan Manuel Felix MD   40 mg at 08/02/22 0625    cefTRIAXone (ROCEPHIN) 1,000 mg in dextrose 5 % 50 mL IVPB mini-bag  1,000 mg IntraVENous Q24H Juan Manuel Felix MD   Stopped at 08/02/22 1459    albuterol (PROVENTIL) nebulizer solution 2.5 mg  2.5 mg Nebulization Q4H PRN Juan Manuel Felix MD        tiotropium (SPIRIVA RESPIMAT) 2.5 MCG/ACT inhaler 2 puff  2 puff Inhalation Daily Juan Manuel Felix MD        potassium chloride 10 mEq/100 mL IVPB (Peripheral Line)  10 mEq IntraVENous Q1H Mag Torrez  mL/hr at 08/02/22 1113 10 mEq at 08/02/22 1113       Allergies:  No Known Allergies    Problem List:    Patient Active Problem List   Diagnosis Code    [facility]eps[facility] A41.9    High anion gap metabolic acidosis E57.6    Hypokalemia E87.6    Elevated lactic acid level R79.89    Acute metabolic encephalopathy N64.47    Generalized weakness R53.1    Abdominal aortic aneurysm (AAA) without rupture (HCC) I71.4    Coronary artery disease involving native coronary artery of native heart without angina pectoris I25.10    Essential hypertension I10    Mixed hyperlipidemia E78.2    Paraplegia ([facility] G82.20  Ulcer of left foot, with necrosis of muscle ([facility] L97.523    Upper GI bleed K92.2       Past Medical History:        Diagnosis Date    Arthritis     CAD (coronary artery disease)     History of blood transfusion 2020    Hx of blood clots     Hyperlipidemia     Hypertension     Paraplegic spinal paralysis ([facility]     due to spinal cord aneurysm rupture    Prostate cancer ([facility]     Wears glasses        Past Surgical History:        Procedure Laterality Date    ABOVE KNEE AMPUTATION Right 2020    BACK SURGERY      for spinal cord aneurysm rupture    CARDIAC SURGERY      quadruple bypass    FOOT DEBRIDEMENT Left 3/10/2020    LEFT FOOT- SHARP EXCISIONAL DEBRIDEMENT DOWN TO, THROUGH AND INCLUDING THE LAYERS OF THE DEEP FASCIA AND TENDON, SECOND DIGIT, POSSIBLE SAUCERIZATION BONE PROXIMAL PHALANX, APPLICATION SKIN GRAFT SUBSTITUTE LEFT FOOT performed by Benita Watson DPM at [address] [facility] N/A 2020    EGD BIOPSY performed by Maris Aldrich MD at [address] [place] [address] History:    Social History     Tobacco Use    Smoking status: Former     Types: Cigarettes     Quit date: 3/9/1976     Years since quittin.4    Smokeless tobacco: Never   Substance Use Topics    Alcohol use: Not Currently                                Counseling given: Not Answered      Vital Signs (Current): There were no vitals filed for this visit.                                            BP Readings from Last 3 Encounters:   22 119/67   10/16/21 (!) 166/84   21 111/66       NPO Status:                                                                                 BMI:   Wt Readings from Last 3 Encounters:   22 187 lb 9.6 oz (85.1 kg)   21 207 lb (93.9 kg)   21 230 lb (104.3 kg)     There is no height or weight on file to calculate BMI.    CBC:   Lab Results   Component Value Date/Time    WBC 9.7 2022 06:31 AM    RBC 2.98 2022 06:31 AM HGB 8.8 08/02/2022 06:31 AM    HCT 27.5 08/02/2022 06:31 AM    MCV 92.3 08/02/2022 06:31 AM    RDW 19.5 08/02/2022 06:31 AM     08/02/2022 06:31 AM       CMP:   Lab Results   Component Value Date/Time     08/02/2022 06:21 AM    K 3.3 08/02/2022 06:21 AM     08/02/2022 06:21 AM    CO2 23 08/02/2022 06:21 AM    BUN 35 08/02/2022 06:21 AM    CREATININE 0.6 08/02/2022 06:21 AM    GFRAA >60 08/02/2022 06:21 AM    AGRATIO 1.4 12/31/2019 05:03 AM    LABGLOM >60 08/02/2022 06:21 AM    GLUCOSE 119 08/02/2022 06:21 AM    PROT 6.1 12/31/2019 05:03 AM    CALCIUM 8.4 08/02/2022 06:21 AM    BILITOT 1.1 12/31/2019 05:03 AM    ALKPHOS 54 12/31/2019 05:03 AM    AST 15 12/31/2019 05:03 AM    ALT 12 12/31/2019 05:03 AM       POC Tests: No results for input(s): POCGLU, POCNA, POCK, POCCL, POCBUN, POCHEMO, POCHCT in the last 72 hours.     Coags:   Lab Results   Component Value Date/Time    PROTIME 12.7 04/24/2021 08:51 PM    INR 1.09 04/24/2021 08:51 PM       HCG (If Applicable): No results found for: PREGTESTUR, PREGSERUM, HCG, HCGQUANT     ABGs: No results found for: PHART, PO2ART, AIE9GLS, PEF2GHG, BEART, M5HNQFSC     Type & Screen (If Applicable):  No results found for: LABABO, LABRH    Drug/Infectious Status (If Applicable):  No results found for: HIV, HEPCAB    COVID-19 Screening (If Applicable):   Lab Results   Component Value Date/Time    COVID19 NOT DETECTED 09/18/2020 06:16 PM         Anesthesia Evaluation  Patient summary reviewed and Nursing notes reviewed no history of anesthetic complications:   Airway: Mallampati: II  TM distance: >3 FB   Neck ROM: full  Mouth opening: > = 3 FB   Dental: normal exam         Pulmonary:Negative Pulmonary ROS and normal exam  breath sounds clear to auscultation                             Cardiovascular:Negative CV ROS  Exercise tolerance: no interval change,   (+) hypertension:, CAD:,     (-)  angina      Rhythm: regular  Rate: normal           Beta Blocker:  Not on Beta Blocker      ROS comment:  Global left ventricular function is normal with ejection fraction estimated   from 50-55%   Grade I diastolic dysfunction with normal LV filling pressures. Neuro/Psych:   Negative Neuro/Psych ROS     (-) neuromuscular disease (paraplegic)           GI/Hepatic/Renal: Neg GI/Hepatic/Renal ROS            Endo/Other: Negative Endo/Other ROS                    Abdominal:         (-) obese       Vascular: negative vascular ROS. Other Findings:             Anesthesia Plan      MAC     ASA 4     (I discussed with the patient the risks and benefits of PIV, anesthesia, IV Narcotics, PACU. All questions were answered the patient agrees with the plan and wishes to proceed)  Induction: intravenous. Anesthetic plan and risks discussed with patient. Pre-Operative Diagnosis: No admission diagnoses are documented for this encounter. 82 y.o.   BMI:  There is no height or weight on file to calculate BMI. There were no vitals filed for this visit.     No Known Allergies    Social History     Tobacco Use    Smoking status: Former     Types: Cigarettes     Quit date: 3/9/1976     Years since quittin.4    Smokeless tobacco: Never   Substance Use Topics    Alcohol use: Not Currently       LABS:    CBC  Lab Results   Component Value Date/Time    WBC 9.7 2022 06:31 AM    HGB 8.8 (L) 2022 06:31 AM    HCT 27.5 (L) 2022 06:31 AM     2022 06:31 AM     RENAL  Lab Results   Component Value Date/Time     (H) 2022 06:21 AM    K 3.3 (L) 2022 06:21 AM     (H) 2022 06:21 AM    CO2 23 2022 06:21 AM    BUN 35 (H) 2022 06:21 AM    CREATININE 0.6 (L) 2022 06:21 AM    GLUCOSE 119 (H) 2022 06:21 AM     COAGS  Lab Results   Component Value Date/Time    PROTIME 12.7 2021 08:51 PM    INR 1.09 2021 08:51 PM           Mukeshgina Richlawn, DO   2022

## 2022-08-02 NOTE — PROGRESS NOTES
4 Eyes Admission Assessment     I agree as the admission nurse that 2 RN's have performed a thorough Head to Toe Skin Assessment on the patient. ALL assessment sites listed below have been assessed on admission. Areas assessed by both nurses:   [x]   Head, Face, and Ears   [x]   Shoulders, Back, and Chest  [x]   Arms, Elbows, and Hands   [x]   Coccyx, Sacrum, and Ischium  [x]   Legs, Feet, and Heels        Does the Patient have Skin Breakdown?   Yes a wound was noted on the Admission Assessment and an LDA was Initiated documentation include the Rosa-wound, Wound Assessment, Measurements, Dressing Treatment, Drainage, and Color\",         Isaias Prevention initiated:  Yes   Wound Care Orders initiated:  Yes      WOC nurse consulted for Pressure Injury (Stage 3,4, Unstageable, DTI, NWPT, and Complex wounds) or Isaias score 18 or lower:  Yes      Nurse 1 eSignature: Electronically signed by Wan De La Vega RN on 8/2/22 at 3:32 AM EDT    **SHARE this note so that the co-signing nurse is able to place an eSignature**    Nurse 2 eSignature: Electronically signed by Rosa Damon RN on 8/2/22 at 6:42 AM EDT

## 2022-08-02 NOTE — H&P
History and Physical  PGY-3    PCP: Lorena Méndez MD    Code:Full Code  Admit Date: 8/1/2022  Diet: Diet NPO Exceptions are: Ice Chips      History of present illness:      CC: AMS    Patient is a 82 y.o. male with a PMHx of paraplegia, hypertension, hyperlipidemia, CAD, Covid 23, COPD presenting with chief complaint of altered mental status. Patient was nodding and shaking his head to my questions. History was taken from the daughter. Per daughter, patient has been having decrease in his oral intake for the past 2 weeks. Patient has also seemed more fatigue. Per daughter, patient has become less responsive for the past week. Patient has sacral ulcer and is being managed at [facility]. Due to the ulcer, patient was asked to use air mattress. Patient was asked not to sit in a chair which can delay the wound healing of his sacral ulcer. Patient does have chronic indwelling Dueñas catheter. Patient denied having fever, chest pain, nausea, vomiting, numbness, tingling but he is shaking his head. In the ED, temperature 99.4 °F, respiratory rate 16, pulse 87, blood pressure 125/74, SPO2 97% on room air. Labs are significant for sodium 149,, creatinine 0.7, hemoglobin 8.9. UA was positive for nitrite and leukocyte esterase. In the ED, patient appeared to be impacted. Manual disimpaction was performed at bedside and large amount of hard black-colored stool was seen. Patient was admitted to the hospital for altered mental status and's blood pressure blood. ROS: Review of Systems -   All other systems reviewed and are negative.     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    Prostate cancer ([facility]     Wears glasses      Past Surgical History:   Procedure Laterality Date    ABOVE KNEE AMPUTATION Right 07/2020    BACK SURGERY      for spinal cord aneurysm rupture    CARDIAC SURGERY      quadruple bypass    FOOT DEBRIDEMENT Left 3/10/2020    LEFT FOOT- SHARP EXCISIONAL DEBRIDEMENT DOWN TO, THROUGH AND INCLUDING THE LAYERS OF THE DEEP FASCIA AND TENDON, SECOND DIGIT, POSSIBLE SAUCERIZATION BONE PROXIMAL PHALANX, APPLICATION SKIN GRAFT SUBSTITUTE LEFT FOOT performed by Dexter Vallecillo DPM at [address] [place] [address] 9/22/2020    EGD BIOPSY performed by Nathalia Butcher MD at [address] [place] [address]       Medications Prior to Admission:    No current facility-administered medications on file prior to encounter.      Current Outpatient Medications on File Prior to Encounter   Medication Sig Dispense Refill    potassium chloride (KLOR-CON M) 20 MEQ extended release tablet Take 1 tablet by mouth 2 times daily 90 tablet 1    furosemide (LASIX) 40 MG tablet Take 1.5 tablets by mouth 2 times daily (Patient taking differently: Take 60 mg by mouth 2 times daily 60 mg BID 8/5/21 per dr. Jose Carlos Camargo) 180 tablet 3    omeprazole (PRILOSEC) 20 MG delayed release capsule Take 40 mg by mouth Daily      tiotropium (SPIRIVA) 18 MCG inhalation capsule Inhale 18 mcg into the lungs daily      levoFLOXacin (LEVAQUIN) 750 MG tablet Take 750 mg by mouth daily      Cyanocobalamin (VITAMIN B 12 PO) Take by mouth      olmesartan-hydroCHLOROthiazide (BENICAR HCT) 20-12.5 MG per tablet Take 1 tablet by mouth daily (Patient taking differently: Take 0.5 tablets by mouth daily taking) 90 tablet 3    ferrous sulfate (IRON 325) 325 (65 Fe) MG tablet Take 325 mg by mouth daily (with breakfast)      acetaminophen (TYLENOL) 325 MG tablet Take 325 mg by mouth 2 times daily       lovastatin (MEVACOR) 40 MG tablet Take 40 mg by mouth nightly      aspirin 81 MG tablet Take 81 mg by mouth daily      albuterol sulfate  (90 BASE) MCG/ACT inhaler Inhale 2 puffs into the lungs as needed for Wheezing         Allergies:  Patient has no known allergies. Social History:   TOBACCO:   reports that he quit smoking about 46 years ago. He has never used smokeless tobacco.       ETOH:   reports that he does not currently use alcohol. Family History:       Problem Relation Age of Onset    Alzheimer's Disease Mother     Heart Disease Father        Vital/I&O/Physical examination:   VS:  /69   Pulse 78   Temp 98.2 °F (36.8 °C) (Oral)   Resp 16   Wt 187 lb 9.6 oz (85.1 kg)   SpO2 96%   BMI 26.92 kg/m²     I/O:    Intake/Output Summary (Last 24 hours) at 8/2/2022 0208  Last data filed at 8/1/2022 2341  Gross per 24 hour   Intake 1000 ml   Output --   Net 1000 ml       PE:  Physical Exam  Vitals reviewed. Constitutional:       Appearance: He is ill-appearing. HENT:      Head: Normocephalic and atraumatic. Eyes:      Extraocular Movements: Extraocular movements intact. Conjunctiva/sclera: Conjunctivae normal.      Pupils: Pupils are equal, round, and reactive to light. Cardiovascular:      Rate and Rhythm: Normal rate and regular rhythm. Pulses: Normal pulses. Heart sounds: Normal heart sounds. Pulmonary:      Effort: Pulmonary effort is normal.      Breath sounds: Normal breath sounds. Abdominal:      Palpations: Abdomen is soft. Musculoskeletal:      Cervical back: Normal range of motion and neck supple. Comments: Right lower extremity AKA. Patient can move bilateral upper extremities. Decreased sensation and strength on bilateral lower extremity due to history of paraplegia       Labs & Imaging:   LABS:  CBC:   Recent Labs     08/01/22 2045   WBC 10.9   HGB 8.9*   HCT 27.8*      MCV 91.9                            Renal:   Recent Labs     08/01/22 2045   *   K 3.5   *   CO2 20*   BUN 44*   CREATININE 0.7*   GLUCOSE 134*   ANIONGAP 14     Hepatic: No results for input(s): AST, ALT, ALB, BILITOT, ALKPHOS in the last 72 hours.   Troponin: No results for input(s): TROPONINI in the last 72 hours. BNP: No results for input(s): BNP in the last 72 hours. Lipids: No results for input(s): CHOL, HDL in the last 72 hours. Invalid input(s): LDLCALCU, TRIGLYCERIDE  INR: No results for input(s): INR in the last 72 hours. Lactate: No results for input(s): LACTATE in the last 72 hours. ABGs:No results for input(s): PHART, DAF2YVP, PO2ART, DHW9VOX, BEART, THGBART, P5KQHTLN, ORB8FUE in the last 72 hours. UA:  Recent Labs     08/01/22  2045   COLORU Yellow   PHUR 6.0   WBCUA 3-5   RBCUA 0-2   MUCUS 2+*   BACTERIA 2+*   CLARITYU Clear   SPECGRAV 1.020   LEUKOCYTESUR LARGE*   UROBILINOGEN 0.2   BILIRUBINUR Negative   BLOODU SMALL*   GLUCOSEU Negative        IMAGING:  XR CHEST PORTABLE   Final Result      No acute pulmonary disease. CT HEAD WO CONTRAST    (Results Pending)       Assessment & Plan:    Anahi Renner is a 82 y.o. male with PMHx significant for paraplegia, hypertension, hyperlipidemia, CAD, Covid 23, COPD presenting with chief complaint of altered mental status. Altered mental status:  -Secondary to most likely UTI  -Patient presented with altered mental status. Patient has been having decreased oral intake and fatigue. -CT head showed no acute intracranial abnormalities. Chest x-ray showed no acute pulmonary disease. -UA was positive for leukocyte esterase and nitrite. Patient is on chronic Dueñas catheter.  -Follow-up urine culture. -Ceftriaxone    Acute blood loss anemia suspected acute GI bleed  -Secondary to GI bleed. -Patient presented with hemoglobin of 8.9. Baseline hemoglobin is between 11-12.  -In the ED, manual disimpaction was performed at bedside and large amount of hard black-colored stool was seen.   -Protonix 40 mg twice daily  -N.p.o.  -IV fluid  -Holding aspirin  -GI consult    Hyponatremia:  -IV fluid    COPD:  -Continue home Spiriva and albuterol    Hypertension:  -Holding home blood pressure medication. CAD:  -Holding aspirin    Code Status: Full Code  Diet NPO Exceptions are: Ice Chips  PPX: Protonix, SCDs      This patient will be discussed with attending, Júnior Lei DO. Nazario Joseph MD MD, PGY- 3  Contact via [facility]  8/2/2022,  2:08 AM     I saw the patient independently from the resident . I discussed the care with the resident. I personally reviewed the HPI, PH, FH, SH, ROS and medications. I repeated pertinent portions of the examination and reviewed the relevant imaging and laboratory data. I agree with the findings, assessment and plan as documented.  addition to: Plan as above

## 2022-08-02 NOTE — CONSULTS
Clinical Pharmacy Progress Note  Medication History    Admit Date: 8/1/2022  Consulting Provider: Dr. Ej Smith     List of current medications patient is taking is complete. Home medication list in EPIC updated to reflect changes noted below. Source of information: outpatient fill history and patient's family    Changes made to medication list:   Medications removed:  Benicar  Tiotropium  Tylenol  Levofloxacin (completed therapy)    Medications added:   Probiotic  Senna  Sodium Chloride 0.9% nebulizer solution    Medication doses adjusted:   Furosemide 20 mg BID instead of 60 mg BID  Ferrous sulfate 325 mg every other day instead of daily    Notes:  Patient recently finished Augmentin 875/125 mg 1 BID x7 days for UTI      Please call with any questions.     Raulito Boone, PharmD  PGY-1 Pharmacy Resident  [facility]  [id number]  8/2/2022   11:57 AM

## 2022-08-02 NOTE — CONSULTS
Gastroenterology Consult Note      Patient: Marija Kramer  : 1935  Acct#:      Date:  2022    Subjective:  - Patient limited historian d/t AMS     History of Present Illness  Patient is a 82 y.o.  male who is seen in consult for anemia. Patient is a limited historian, presented to the hospital for evaluation of AMS. Unable to tell us why he presented to the hospital or if he has any concerns or complaints today. Per chart review, patient's daughter has been the main source of history. She was not at the bedside this morning, so history obtained via chart review. In the ED, the patient ws found to have a UTI which may have been contributing to his AMS. In the ED, the patient had a manual disimpaction at bedside which yielded hard, black-colored stool. Hemoglobin in the ED was noted to be 8.9. MCV 91.9. 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    Prostate cancer ([address]     Wears glasses       Past Surgical History:   Procedure Laterality Date    ABOVE KNEE AMPUTATION Right 2020    BACK SURGERY      for spinal cord aneurysm rupture    CARDIAC SURGERY      quadruple bypass    FOOT DEBRIDEMENT Left 3/10/2020    LEFT FOOT- SHARP EXCISIONAL DEBRIDEMENT DOWN TO, THROUGH AND INCLUDING THE LAYERS OF THE DEEP FASCIA AND TENDON, SECOND DIGIT, POSSIBLE SAUCERIZATION BONE PROXIMAL PHALANX, APPLICATION SKIN GRAFT SUBSTITUTE LEFT FOOT performed by Mely Campuzano DPM at [facility] N/A 2020    EGD BIOPSY performed by Arnulfo Carias MD at [address] [place] [address]      Past Endoscopic History: Unknown    Admission Meds  No current facility-administered medications on file prior to encounter.      Current Outpatient Medications on File Prior to Encounter   Medication Sig Dispense Refill    fluticasone-umeclidin-vilant (TRELEGY ELLIPTA) 100-62.5-25 MCG/INH AEPB Inhale 1 puff into the lungs daily      Multiple Vitamins-Minerals (THERAPEUTIC MULTIVITAMIN-MINERALS) tablet Take 1 tablet by mouth in the morning. potassium chloride (KLOR-CON M) 20 MEQ extended release tablet Take 1 tablet by mouth 2 times daily 90 tablet 1    furosemide (LASIX) 40 MG tablet Take 1.5 tablets by mouth 2 times daily (Patient taking differently: Take 20 mg by mouth in the morning and 20 mg before bedtime. 60 mg BID 21 per dr. Tiera Fitzgerald. ) 180 tablet 3    omeprazole (PRILOSEC) 20 MG delayed release capsule Take 40 mg by mouth Daily      tiotropium (SPIRIVA) 18 MCG inhalation capsule Inhale 18 mcg into the lungs daily      levoFLOXacin (LEVAQUIN) 750 MG tablet Take 750 mg by mouth daily      Cyanocobalamin (VITAMIN B 12 PO) Take by mouth      olmesartan-hydroCHLOROthiazide (BENICAR HCT) 20-12.5 MG per tablet Take 1 tablet by mouth daily (Patient taking differently: Take 0.5 tablets by mouth daily taking) 90 tablet 3    ferrous sulfate (IRON 325) 325 (65 Fe) MG tablet Take 325 mg by mouth daily (with breakfast)      acetaminophen (TYLENOL) 325 MG tablet Take 325 mg by mouth 2 times daily       lovastatin (MEVACOR) 40 MG tablet Take 20 mg by mouth nightly      aspirin 81 MG tablet Take 81 mg by mouth daily      albuterol sulfate  (90 BASE) MCG/ACT inhaler Inhale 2 puffs into the lungs as needed for Wheezing           Allergies  No Known Allergies     Social history:  Social History     Tobacco Use    Smoking status: Former     Types: Cigarettes     Quit date: 3/9/1976     Years since quittin.4    Smokeless tobacco: Never   Substance Use Topics    Alcohol use: Not Currently        Family History:   Family History   Problem Relation Age of Onset    Alzheimer's Disease Mother     Heart Disease Father           Review of Systems  Pertinent items are noted in HPI.       Physical Exam:  Blood pressure 111/68, pulse 63, temperature 97.8 °F (36.6 °C), temperature source Oral, resp. rate 18, weight 187 lb 9.6 oz (85.1 kg), SpO2 97 %. Physical Exam  Constitutional:       General: He is awake. Abdominal:      General: Abdomen is flat. Bowel sounds are normal.      Palpations: Abdomen is soft. Tenderness: There is no abdominal tenderness. Neurological:      Mental Status: He is disoriented and confused. Psychiatric:         Behavior: Behavior is cooperative. Data Review:    Recent Labs     08/01/22 2045 08/02/22 0621 08/02/22  0631   WBC 10.9  --  9.7   HGB 8.9* 8.7* 8.8*   HCT 27.8* 27.2* 27.5*   MCV 91.9  --  92.3     --  388     Recent Labs     08/01/22 2045 08/02/22  0621   * 152*   K 3.5 3.3*   * 117*   CO2 20* 23   BUN 44* 35*   CREATININE 0.7* 0.6*     No results for input(s): AST, ALT, ALB, BILIDIR, BILITOT, ALKPHOS in the last 72 hours. No results for input(s): LIPASE, AMYLASE in the last 72 hours. No results for input(s): PROTIME, INR in the last 72 hours. No results for input(s): PTT in the last 72 hours. No results for input(s): OCCULTBLD in the last 72 hours. Imaging Studies:    CT HEAD WO CONTRAST   Final Result      1. No acute intracranial process. 2.  Stable moderate to severe cerebral atrophy and chronic small vessel ischemic disease. XR CHEST PORTABLE   Final Result      No acute pulmonary disease. Assessment:     1) Cha Denton is an 81 yo M presenting to the hospital for evaluation of AMS. He was found to have a hemoglobin of 8.9 and MCV of 92 on admission, baseline Hgb 11-12. Patient had a manual disimpaction of hard, black-colored stool at bedside. Recommendations:   1- Anemia- 2/2 Upper GI bleed   - Black-colored stool and anemia raise suspicion for Upper GI Bleed.    - Patient will have an EGD today for Upper GI evaluation   - If okay, we believe that the patient can continue to be monitored. - Will consider beginning course of PPI. 2- Chronic Medical Conditions- managed via primary team    Thank you for allowing me to participate in the care of your patient. Please feel free to contact me with any questions or concerns.      Roseanne Mckay MD  PGY1, Internal Medicine  08/02/22  11:15 AM

## 2022-08-02 NOTE — PLAN OF CARE
Problem: Discharge Planning  Goal: Discharge to home or other facility with appropriate resources  8/2/2022 1727 by Jad Mary RN  Outcome: Progressing  Flowsheets (Taken 8/2/2022 0920)  Discharge to home or other facility with appropriate resources:   Identify barriers to discharge with patient and caregiver   Arrange for needed discharge resources and transportation as appropriate   Identify discharge learning needs (meds, wound care, etc)   Refer to discharge planning if patient needs post-hospital services based on physician order or complex needs related to functional status, cognitive ability or social support system  8/2/2022 0355 by Shreya Henderson RN  Outcome: Progressing     Problem: Skin/Tissue Integrity  Goal: Absence of new skin breakdown  Description: 1. Monitor for areas of redness and/or skin breakdown  2. Assess vascular access sites hourly  3. Every 4-6 hours minimum:  Change oxygen saturation probe site  4. Every 4-6 hours:  If on nasal continuous positive airway pressure, respiratory therapy assess nares and determine need for appliance change or resting period.   8/2/2022 1727 by Jad Mary RN  Outcome: Progressing  8/2/2022 0355 by Shreya Henderson RN  Outcome: Progressing     Problem: Safety - Adult  Goal: Free from fall injury  8/2/2022 1727 by Jad Mary RN  Outcome: Progressing  Flowsheets (Taken 8/2/2022 0920)  Free From Fall Injury:   Sandra Bonner family/caregiver on patient safety   Based on caregiver fall risk screen, instruct family/caregiver to ask for assistance with transferring infant if caregiver noted to have fall risk factors  8/2/2022 0355 by Shreya Henderson RN  Outcome: Progressing     Problem: ABCDS Injury Assessment  Goal: Absence of physical injury  8/2/2022 1727 by Jad Mary RN  Outcome: Progressing  Flowsheets (Taken 8/2/2022 0920)  Absence of Physical Injury: Implement safety measures based on patient assessment  8/2/2022 0355 by Shreya Henderson RN  Outcome: Progressing

## 2022-08-03 VITALS
SYSTOLIC BLOOD PRESSURE: 121 MMHG | BODY MASS INDEX: 26.92 KG/M2 | TEMPERATURE: 97.9 F | RESPIRATION RATE: 18 BRPM | DIASTOLIC BLOOD PRESSURE: 69 MMHG | WEIGHT: 187.6 LBS | HEART RATE: 69 BPM | OXYGEN SATURATION: 99 %

## 2022-08-03 LAB
ANION GAP SERPL CALCULATED.3IONS-SCNC: 12 MMOL/L (ref 3–16)
APTT: 39.4 SEC (ref 23–34.3)
BUN BLDV-MCNC: 21 MG/DL (ref 7–20)
CALCIUM SERPL-MCNC: 8.5 MG/DL (ref 8.3–10.6)
CHLORIDE BLD-SCNC: 112 MMOL/L (ref 99–110)
CO2: 21 MMOL/L (ref 21–32)
CREAT SERPL-MCNC: 0.6 MG/DL (ref 0.8–1.3)
GFR AFRICAN AMERICAN: >60
GFR NON-AFRICAN AMERICAN: >60
GLUCOSE BLD-MCNC: 130 MG/DL (ref 70–99)
HCT VFR BLD CALC: 25.1 % (ref 40.5–52.5)
HCT VFR BLD CALC: 25.5 % (ref 40.5–52.5)
HCT VFR BLD CALC: 26.2 % (ref 40.5–52.5)
HEMOGLOBIN: 7.9 G/DL (ref 13.5–17.5)
HEMOGLOBIN: 8 G/DL (ref 13.5–17.5)
HEMOGLOBIN: 8.4 G/DL (ref 13.5–17.5)
INR BLD: 1.27 (ref 0.87–1.14)
IRON SATURATION: 11 % (ref 20–50)
IRON: 17 UG/DL (ref 59–158)
LACTIC ACID: 1.2 MMOL/L (ref 0.4–2)
MCH RBC QN AUTO: 28.9 PG (ref 26–34)
MCHC RBC AUTO-ENTMCNC: 31.3 G/DL (ref 31–36)
MCV RBC AUTO: 92.4 FL (ref 80–100)
PDW BLD-RTO: 19.4 % (ref 12.4–15.4)
PLATELET # BLD: 341 K/UL (ref 135–450)
PMV BLD AUTO: 7.5 FL (ref 5–10.5)
POTASSIUM REFLEX MAGNESIUM: 3.6 MMOL/L (ref 3.5–5.1)
PROTHROMBIN TIME: 15.9 SEC (ref 11.7–14.5)
RBC # BLD: 2.76 M/UL (ref 4.2–5.9)
SODIUM BLD-SCNC: 144 MMOL/L (ref 136–145)
SODIUM BLD-SCNC: 145 MMOL/L (ref 136–145)
TOTAL IRON BINDING CAPACITY: 153 UG/DL (ref 260–445)
WBC # BLD: 8.7 K/UL (ref 4–11)

## 2022-08-03 PROCEDURE — C9113 INJ PANTOPRAZOLE SODIUM, VIA: HCPCS | Performed by: STUDENT IN AN ORGANIZED HEALTH CARE EDUCATION/TRAINING PROGRAM

## 2022-08-03 PROCEDURE — 36415 COLL VENOUS BLD VENIPUNCTURE: CPT

## 2022-08-03 PROCEDURE — 83605 ASSAY OF LACTIC ACID: CPT

## 2022-08-03 PROCEDURE — 2580000003 HC RX 258: Performed by: STUDENT IN AN ORGANIZED HEALTH CARE EDUCATION/TRAINING PROGRAM

## 2022-08-03 PROCEDURE — 85610 PROTHROMBIN TIME: CPT

## 2022-08-03 PROCEDURE — A4216 STERILE WATER/SALINE, 10 ML: HCPCS | Performed by: STUDENT IN AN ORGANIZED HEALTH CARE EDUCATION/TRAINING PROGRAM

## 2022-08-03 PROCEDURE — 85730 THROMBOPLASTIN TIME PARTIAL: CPT

## 2022-08-03 PROCEDURE — 85027 COMPLETE CBC AUTOMATED: CPT

## 2022-08-03 PROCEDURE — 83550 IRON BINDING TEST: CPT

## 2022-08-03 PROCEDURE — 6370000000 HC RX 637 (ALT 250 FOR IP)

## 2022-08-03 PROCEDURE — 96376 TX/PRO/DX INJ SAME DRUG ADON: CPT

## 2022-08-03 PROCEDURE — 83540 ASSAY OF IRON: CPT

## 2022-08-03 PROCEDURE — 85018 HEMOGLOBIN: CPT

## 2022-08-03 PROCEDURE — 80048 BASIC METABOLIC PNL TOTAL CA: CPT

## 2022-08-03 PROCEDURE — 6360000002 HC RX W HCPCS: Performed by: STUDENT IN AN ORGANIZED HEALTH CARE EDUCATION/TRAINING PROGRAM

## 2022-08-03 PROCEDURE — 84295 ASSAY OF SERUM SODIUM: CPT

## 2022-08-03 PROCEDURE — 96366 THER/PROPH/DIAG IV INF ADDON: CPT

## 2022-08-03 PROCEDURE — 85014 HEMATOCRIT: CPT

## 2022-08-03 RX ORDER — CEFDINIR 300 MG/1
300 CAPSULE ORAL 2 TIMES DAILY
Qty: 10 CAPSULE | Refills: 0 | Status: SHIPPED | OUTPATIENT
Start: 2022-08-03 | End: 2022-08-08

## 2022-08-03 RX ORDER — PANTOPRAZOLE SODIUM 20 MG/1
20 TABLET, DELAYED RELEASE ORAL DAILY
Qty: 30 TABLET | Refills: 3 | Status: SHIPPED | OUTPATIENT
Start: 2022-08-03

## 2022-08-03 RX ORDER — AMOXICILLIN AND CLAVULANATE POTASSIUM 875; 125 MG/1; MG/1
1 TABLET, FILM COATED ORAL 2 TIMES DAILY
Qty: 10 TABLET | Refills: 0 | Status: CANCELLED | OUTPATIENT
Start: 2022-08-03 | End: 2022-08-08

## 2022-08-03 RX ORDER — PANTOPRAZOLE SODIUM 20 MG/1
40 TABLET, DELAYED RELEASE ORAL DAILY
Qty: 30 TABLET | Refills: 3 | Status: SHIPPED | OUTPATIENT
Start: 2022-08-03 | End: 2022-08-03 | Stop reason: SDUPTHER

## 2022-08-03 RX ORDER — POTASSIUM CHLORIDE 20 MEQ/1
40 TABLET, EXTENDED RELEASE ORAL ONCE
Status: COMPLETED | OUTPATIENT
Start: 2022-08-03 | End: 2022-08-03

## 2022-08-03 RX ADMIN — SODIUM CHLORIDE, PRESERVATIVE FREE 40 MG: 5 INJECTION INTRAVENOUS at 06:24

## 2022-08-03 RX ADMIN — SODIUM CHLORIDE, PRESERVATIVE FREE 10 ML: 5 INJECTION INTRAVENOUS at 08:24

## 2022-08-03 RX ADMIN — CEFTRIAXONE 1000 MG: 1 INJECTION, POWDER, FOR SOLUTION INTRAMUSCULAR; INTRAVENOUS at 02:33

## 2022-08-03 RX ADMIN — POTASSIUM CHLORIDE 40 MEQ: 1500 TABLET, EXTENDED RELEASE ORAL at 10:55

## 2022-08-03 RX ADMIN — DEXTROSE AND SODIUM CHLORIDE: 5; 450 INJECTION, SOLUTION INTRAVENOUS at 04:25

## 2022-08-03 NOTE — DISCHARGE INSTR - COC
Continuity of Care Form    Patient Name: Lisbeth Garcia   :  1935  MRN:  [id number]    Admit date:  2022  Discharge date:  8/3/2022    Code Status Order: Full Code   Advance Directives:   [facility] Documentation       Date/Time Healthcare Directive Type of Healthcare Directive Copy in [address] Agent's Name Healthcare Agent's Phone Number    [phone] No, patient does not have an advance directive for healthcare treatment -- -- -- -- --            Admitting Physician:  Christopher Connolly DO  PCP: Ismael Oglesby MD    Discharging Nurse: Kaykay Alfonso [facility] Unit/Room#: 9930/4275-02  Discharging Unit Phone Number: [phone]    Emergency Contact:   Extended Emergency Contact Information  Primary Emergency Contact: Kelly Bletran  Address: [address] [place] [address] Phone: [phone]  Relation: Spouse  Secondary Emergency Contact: Sherrie Fournier  Home Phone: [phone]  Relation: Child   needed?  No    Past Surgical History:  Past Surgical History:   Procedure Laterality Date    ABOVE KNEE AMPUTATION Right 2020    BACK SURGERY      for spinal cord aneurysm rupture    CARDIAC SURGERY      quadruple bypass    FOOT DEBRIDEMENT Left 3/10/2020    LEFT FOOT- SHARP EXCISIONAL DEBRIDEMENT DOWN TO, THROUGH AND INCLUDING THE LAYERS OF THE DEEP FASCIA AND TENDON, SECOND DIGIT, POSSIBLE SAUCERIZATION BONE PROXIMAL PHALANX, APPLICATION SKIN GRAFT SUBSTITUTE LEFT FOOT [facility]    EGD BIOPSY performed by Nathaniel Pruitt MD at [address] 2022    EGD BIOPSY performed by Carmine Macias MD at [facility] ENDOSCOPY       Immunization History:   Immunization History   Administered Date(s) Administered    COVID-19, PFIZER PURPLE top, DILUTE for use, (age 15 y+), 30mcg/0.3mL 01/27/2021, 02/17/2021, 10/19/2021       Active Problems:  Patient Active Problem List   Diagnosis Code    Sepsis ([facility] A41.9    High anion gap metabolic acidosis B79.0    Hypokalemia E87.6    Elevated lactic acid level H98.26    Acute metabolic encephalopathy Y69.88    Generalized weakness R53.1    Abdominal aortic aneurysm (AAA) without rupture ([facility]) I71.4    Coronary artery disease involving native coronary artery of native heart without angina pectoris I25.10    Essential hypertension I10    Mixed hyperlipidemia E78.2    Paraplegia ([facility]) G82.20    Ulcer of left foot, with necrosis of muscle ([facility] L97.523    Upper GI bleed K92.2       Isolation/Infection:   Isolation            No Isolation          Patient Infection Status       Infection Onset Added Last Indicated Last Indicated By Review Planned Expiration Resolved Resolved By    None active    Resolved    C-diff Rule Out  12/30/19 12/30/19 Clostridium Difficile Toxin/Antigen (Ordered)   12/31/19 Alison Quintanilla RN            Nurse Assessment:  Last Vital Signs: /69   Pulse 69   Temp 97.9 °F (36.6 °C) (Oral)   Resp 18   Wt 187 lb 9.6 oz (85.1 kg)   SpO2 99%   BMI 26.92 kg/m²     Last documented pain score (0-10 scale):    Last Weight:   Wt Readings from Last 1 Encounters:   08/01/22 187 lb 9.6 oz (85.1 kg)     Mental Status:  oriented and alert    IV Access:  - None    Nursing Mobility/ADLs:  Walking   Dependent  Transfer  Dependent  Bathing  Dependent  Dressing  Dependent  Toileting  Dependent  Feeding  Independent  Med Admin  Assisted  Med Delivery   whole    Wound Care Documentation and Therapy:  Wound 08/02/22 Ischium Right (Active)   Wound Image   08/02/22 1025   Wound Etiology Pressure Stage 4 08/03/22 1205   Dressing Status Clean;Dry; Intact 08/03/22 1205   Wound Cleansed Cleansed with saline 08/03/22 0230   Dressing/Treatment Moist to dry; Foam 08/03/22 1205   Dressing Change Due 08/03/22 08/03/22 1205   Wound Length (cm) 4.9 cm 08/02/22 1025   Wound Width (cm) 2.8 cm 08/02/22 1025   Wound Depth (cm) 4.5 cm 08/02/22 1025   Wound Surface Area (cm^2) 13.72 cm^2 08/02/22 1025   Wound Volume (cm^3) 61.74 cm^3 08/02/22 1025   Undermining Starts ___ O'Clock 12 08/03/22 0230   Undermining Ends___ O'Clock 2 08/03/22 0230   Undermining Maxium Distance (cm) 6.6 08/03/22 0230   Wound Assessment Harriman/red;Slough 08/03/22 0230   Drainage Amount Scant 08/03/22 0230   Drainage Description Serosanguinous 08/03/22 0230   Odor None 08/03/22 0230   Rosa-wound Assessment Blanchable erythema 08/03/22 0230   Margins Defined edges 08/03/22 0230   Number of days: 1       Incision 03/10/20 Foot Anterior; Left (Active)   Number of days: 876        Elimination:  Continence: Bowel: Yes  Bladder: Dueñas Catheter  Urinary Catheter: Last Change Date 8/1/2022    Colostomy/Ileostomy/Ileal Conduit: No       Date of Last BM: 8/1/2022    Intake/Output Summary (Last 24 hours) at 8/3/2022 1212  Last data filed at 8/3/2022 0824  Gross per 24 hour   Intake 490 ml   Output 1200 ml   Net -710 ml     I/O last 3 completed shifts: In: 3520 [P.O.:280; I.V.:210; IV Piggyback:1000]  Out: 2000 [Urine:2000]    Safety Concerns: At Risk for Falls    Impairments/Disabilities:      Paralysis - lower extremities; Right AKA    Nutrition Therapy:  Current Nutrition Therapy:   - Oral Diet:  General    Routes of Feeding: Oral  Liquids: No Restrictions  Daily Fluid Restriction: no  Last Modified Barium Swallow with Video (Video Swallowing Test): not done    Treatments at the Time of Hospital Discharge:   Respiratory Treatments: none  Oxygen Therapy:  is not on home oxygen therapy.   Ventilator:    - No ventilator support    Rehab Therapies: none  Weight Bearing Status/Restrictions: No weight bearing restrictions  Other Medical Equipment (for information only, NOT a DME order):  wheelchair  Other Treatments: none    Patient's personal belongings (please select all that are sent with patient):  None    RN SIGNATURE:  Electronically signed by Miladys Dueñas RN on 8/3/22 at 12:17 PM EDT    CASE MANAGEMENT/SOCIAL WORK SECTION    Inpatient Status Date: ***    Readmission Risk Assessment Score:  Readmission Risk              Risk of Unplanned Readmission:  [id number]           Discharging to Facility/ Agency   Name:   Address:  Phone:  Fax:    Dialysis Facility (if applicable)   Name:  Address:  Dialysis Schedule:  Phone:  Fax:    / signature: {Esignature:853042197}    PHYSICIAN SECTION    Prognosis: {Prognosis:1631043324}    Condition at Discharge: 50Sonia Osuna Patient Condition:831400400}    Rehab Potential (if transferring to Rehab): {Prognosis:3033466175}    Recommended Labs or Other Treatments After Discharge: ***    Physician Certification: I certify the above information and transfer of Yoselyn Camara  is necessary for the continuing treatment of the diagnosis listed and that he requires {Admit to Appropriate Level of Care:15181} for {GREATER/LESS:995615794} 30 days.      Update Admission H&P: {CHP DME Changes in ISUSA:831815481}    PHYSICIAN SIGNATURE:  {Esignature:[id number]}

## 2022-08-03 NOTE — DISCHARGE INSTRUCTIONS
Please take cefdinir 300mg daily for 5 days  Please take protonix 20mg daily  Please follow-up with your PCP within 1 week and take all medications as prescribed.   Follow-up wound care with [facility] as before admission

## 2022-08-03 NOTE — PROGRESS NOTES
Patient discharged to [facility]. [facility] transporting patient. Sherrie, patients daughter, made aware of patient leaving facility. Patient denies any needs at this time.

## 2022-08-03 NOTE — DISCHARGE SUMMARY
INTERNAL MEDICINE DEPARTMENT AT [address] [place] [address]  DISCHARGE SUMMARY    Patient ID: Familia Iqbal                                             Discharge Date: 8/3/2022   Patient's PCP: Bhupinder Head MD                                          Discharge Physician: Zaid Rehman MD MD  Admit Date: 8/1/2022   Admitting Physician: Darleen Wright DO    DISCHARGE DIAGNOSES:  - Acute metabolic encephalopathy likely 2/2 to UTI  - UTI  - Hypernatremia    Hospital Course: Familia Iqbal is a 82 y.o. male with a PMHx of paraplegia, hypertension, hyperlipidemia, CAD, Covid 23, COPD presented with chief complaint of altered mental status. Per patients's daughter, patient has been having decrease in his oral intake and has been more fatigued for the past 2 weeks prior to presentation. In the ED stable and afebrile. Patient was hypernatremic (Na of 149), anemic (Hb 8.9) and urinalysis was suggestive of a UTI. Patient also appeared to be impacted. Manual disimpaction was performed at bedside and large amount of hard black-colored stool was seen. Patient was admitted for the management of acute metabolic encephalopathy likely secondary to UTI, hypernatremia and possible GI bleed. Patient was treated with 2 days of Rocephin (8/2-8/3) for UTI with improvement in symptoms. Patient's hypernatremia resolved with IV fluids. GI was also consulted regarding possible GI bleed. Endoscopy was performed on 8/2 which showed no evidence of bleeding,  mild antral gastritis and recommended daily PPI. On day of discharge, patient denied any headaches, lightheadedness/dizziness, cough, shortness of breath, chest pain, nausea/vomiting, abdominal pain, diarrhea or constipation. Patient's status improved. Patient was stable for discharge and agreeable to the plan. Please follow-up as outlined below.      Patient has been asked to monitor for recurrence of symptoms or new symptoms including but not limited to fevers/chills, nausea/vomiting, chest pain, or shortness of breath, and to seek immediate medical attention or call 911 in the case of occurrence. Physical Exam:  /75   Pulse 80   Temp 98.6 °F (37 °C) (Oral)   Resp 16   Wt 187 lb 9.6 oz (85.1 kg)   SpO2 96%   BMI 26.92 kg/m²   General appearance: alert, appears stated age and cooperative  Head: Normocephalic, without obvious abnormality, atraumatic  Eyes: conjunctivae/corneas clear. PERRL, EOM's intact. Fundi benign. Ears: normal TM's and external ear canals both ears  Nose: Nares normal. Septum midline. Mucosa normal. No drainage or sinus tenderness. Throat: lips, mucosa, and tongue normal; teeth and gums normal  Neck: no adenopathy, no carotid bruit, no JVD, supple, symmetrical, trachea midline and thyroid not enlarged, symmetric, no tenderness/mass/nodules  Lungs: clear to auscultation bilaterally  Heart: regular rate and rhythm, S1, S2 normal, no murmur, click, rub or gallop  Abdomen: soft, non-tender; bowel sounds normal; no masses,  no organomegaly  Extremities: Right lower extremity AKA. Patient can move bilateral upper extremities. Decreased sensation and strength on bilateral lower extremity due to history of paraplegia    Skin: sacral ulcer  Neurologic: Grossly normal    Consults:  IP CONSULT TO HOSPITALIST  IP CONSULT TO GI  IP CONSULT TO PHARMACY    Disposition: long term care facility  Discharged Condition: Stable  Follow Up/Instructions: Primary Care Physician in one week. Please take cefdinir 300mg daily for 5 days  Please take protonix 20mg daily.     DISCHARGE MEDICATION:     Medication List        STOP taking these medications      Levaquin 750 MG tablet  Generic drug: levoFLOXacin            ASK your doctor about these medications      albuterol sulfate  (90 Base) MCG/ACT inhaler  Commonly known as: PROVENTIL;VENTOLIN;PROAIR     aspirin 81 MG tablet     ferrous sulfate 325 (65 Fe) MG tablet  Commonly known as: IRON 325     furosemide 20 MG tablet  Commonly known as: LASIX  Ask about: Which instructions should I use?     lovastatin 20 MG tablet  Commonly known as: MEVACOR     potassium chloride 20 MEQ extended release tablet  Commonly known as: KLOR-CON M  Take 1 tablet by mouth 2 times daily     PROBIOTIC DAILY PO     senna 8.6 MG tablet  Commonly known as: SENOKOT     sodium chloride nebulizer 0.9 % solution     therapeutic multivitamin-minerals tablet     Trelegy Ellipta 100-62.5-25 MCG/INH Aepb  Generic drug: fluticasone-umeclidin-vilant     VITAMIN B 12 PO            Activity: activity as tolerated  Diet: regular diet  Wound Care: as directed    Time Spent on discharge is more than 30 minutes    Signed:  Kesha Mcnair MD,  PGY1   8/3/2022  Patient seen and examined, labs and imaging reviewed, agree with assessment and plan as outlined above. patients condition continues to improve doing well, d/w daughter yesterday, patient is medically stable for dc in the hospital he becomes delirious, will dc today with close outpatient monitoring. Greater than 30 minutes spent on case on day of discharge.      Sid Hernandez MD FACP

## 2022-08-03 NOTE — PROGRESS NOTES
Gastroenterology Consult Note      Patient: Tej De Leon  : 1935  Acct#:      Date:  8/3/2022    Subjective:  - Patient limited historian d/t AMS- continuing today 8/3/22     History of Present Illness  Patient is a 82 y.o.  male who is seen in consult for anemia. Patient is a limited historian, presented to the hospital for evaluation of AMS. Unable to tell us why he presented to the hospital or if he has any concerns or complaints today. Per chart review, patient's daughter has been the main source of history. She was not at the bedside this morning, so history obtained via chart review. In the ED, the patient ws found to have a UTI which may have been contributing to his AMS. In the ED, the patient had a manual disimpaction at bedside which yielded hard, black-colored stool. Hemoglobin in the ED was noted to be 8.9. MCV 91.9.       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    Prostate cancer ([address]     Wears glasses       Past Surgical History:   Procedure Laterality Date    ABOVE KNEE AMPUTATION Right 2020    BACK SURGERY      for spinal cord aneurysm rupture    CARDIAC SURGERY      quadruple bypass    FOOT DEBRIDEMENT Left 3/10/2020    LEFT FOOT- SHARP EXCISIONAL DEBRIDEMENT DOWN TO, THROUGH AND INCLUDING THE LAYERS OF THE DEEP FASCIA AND TENDON, SECOND DIGIT, POSSIBLE SAUCERIZATION BONE PROXIMAL PHALANX, APPLICATION SKIN GRAFT SUBSTITUTE LEFT FOOT performed by Vernon Garduno DPM at [facility] 2020    EGD BIOPSY performed by Hero Shankar MD at [facility] 2022    EGD BIOPSY performed by Maya Ortiz MD at [address] ENDOSCOPY      Past Endoscopic History: Unknown    Admission Meds  No current facility-administered medications on file prior to encounter. Current Outpatient Medications on File Prior to Encounter   Medication Sig Dispense Refill    fluticasone-umeclidin-vilant (TRELEGY ELLIPTA) 100-62.5-25 MCG/INH AEPB Inhale 1 puff into the lungs daily      Multiple Vitamins-Minerals (THERAPEUTIC MULTIVITAMIN-MINERALS) tablet Take 1 tablet by mouth in the morning. Probiotic Product (PROBIOTIC DAILY PO) Take 1 capsule by mouth daily      sodium chloride nebulizer 0.9 % solution Take 3 mLs by nebulization in the morning. senna (SENOKOT) 8.6 MG tablet Take 1 tablet by mouth in the morning and 1 tablet before bedtime. [DISCONTINUED] potassium chloride (KLOR-CON M) 20 MEQ extended release tablet Take 1 tablet by mouth 2 times daily 90 tablet 1    Cyanocobalamin (VITAMIN B 12 PO) Take 1 tablet by mouth daily      ferrous sulfate (IRON 325) 325 (65 Fe) MG tablet Take 325 mg by mouth every other day      aspirin 81 MG tablet Take 81 mg by mouth daily      albuterol sulfate  (90 BASE) MCG/ACT inhaler Inhale 2 puffs into the lungs as needed for Wheezing           Allergies  No Known Allergies     Social history:  Social History     Tobacco Use    Smoking status: Former     Types: Cigarettes     Quit date: 3/9/1976     Years since quittin.4    Smokeless tobacco: Never   Substance Use Topics    Alcohol use: Not Currently        Family History:   Family History   Problem Relation Age of Onset    Alzheimer's Disease Mother     Heart Disease Father           Review of Systems  Pertinent items are noted in HPI. Physical Exam:  Blood pressure 121/69, pulse 69, temperature 97.9 °F (36.6 °C), temperature source Oral, resp. rate 18, weight 187 lb 9.6 oz (85.1 kg), SpO2 99 %. Physical Exam  Constitutional:       General: He is awake. Abdominal:      General: Abdomen is flat. Bowel sounds are normal.      Palpations: Abdomen is soft. Tenderness: There is no abdominal tenderness. Neurological:      Mental Status: He is disoriented and confused. Psychiatric:         Behavior: Behavior is cooperative. Data Review:    Recent Labs     08/01/22 2045 08/02/22 0621 08/02/22  0631 08/02/22  2032 08/03/22  0126 08/03/22  0543 08/03/22  0726   WBC 10.9  --  9.7  --   --  8.7  --    HGB 8.9*   < > 8.8*   < > 7.9* 8.0* 8.4*   HCT 27.8*   < > 27.5*   < > 25.1* 25.5* 26.2*   MCV 91.9  --  92.3  --   --  92.4  --      --  388  --   --  341  --     < > = values in this interval not displayed. Recent Labs     08/01/22 2045 08/02/22 0621 08/02/22  1521 08/03/22  0126 08/03/22  0543   * 152* 151* 144 145   K 3.5 3.3*  --   --  3.6   * 117*  --   --  112*   CO2 20* 23  --   --  21   BUN 44* 35*  --   --  21*   CREATININE 0.7* 0.6*  --   --  0.6*       No results for input(s): AST, ALT, ALB, BILIDIR, BILITOT, ALKPHOS in the last 72 hours. No results for input(s): LIPASE, AMYLASE in the last 72 hours. Recent Labs     08/03/22  0543   PROTIME 15.9*   INR 1.27*     No results for input(s): PTT in the last 72 hours. No results for input(s): OCCULTBLD in the last 72 hours. Imaging Studies:    CT HEAD WO CONTRAST   Final Result      1. No acute intracranial process. 2.  Stable moderate to severe cerebral atrophy and chronic small vessel ischemic disease. XR CHEST PORTABLE   Final Result      No acute pulmonary disease. Assessment:     1) Anum Spencer is an 81 yo M presenting to the hospital for evaluation of AMS. He was found to have a hemoglobin of 8.9 and MCV of 92 on admission, baseline Hgb 11-12. Patient had a manual disimpaction of hard, black-colored stool at bedside. Recommendations:   1- Anemia- 2/2 Upper GI bleed   - Black-colored stool and anemia raise suspicion for Upper GI Bleed. - Patient had EGD yesterday which showed antral gastritis. Biopsies taken.    - Advance diet as tolerated.   - Avoid NSAIDs   - Protonix BID  2- Chronic Medical Conditions- managed via primary team    At this point, given patient's stability, there are no further interventions planned from a GI perspective. We will sign off from the patient at this time. Please contact us if there are any changes.    Thank you for allowing me to participate in the care of your patient.  Please feel free to contact me with any questions or concerns.     Gerald Arreola MD  PGY1, Internal Medicine  08/03/22  11:29 AM

## 2022-08-03 NOTE — CARE COORDINATION
Case Management Assessment            Discharge Note                    Date / Time of Note: 8/3/2022 11:58 AM                  Discharge Note Completed by: Patricia Eisenmenger, MSW    Patient Name: Vince Parsons   YOB: 1935  Diagnosis: Melena [K92.1]  Hypernatremia [E87.0]  Upper GI bleed [K92.2]  Failure to thrive in adult [R62.7]   Date / Time: 8/1/2022  7:45 PM    Current PCP: Jayshree Davis MD  Clinic patient: No    Hospitalization in the last 30 days: No    Advance Directives:  Code Status: Full Code  [place] DNR form completed and on chart: Not Indicated    Financial:  Payor: Marilyn Monsivais / Plan: Judit Oliveros / Product Type: *No Product type* /      Pharmacy:    [facility] [id number] [facility] access hospital[facility]alyn [facility] [phone] - F [phone]  [address] [place] [address]  Phone: [phone] Fax: [phone]    [facility] [id number] [address] [phone] [facility] [phone]  [place] [address]  Phone: [phone] Fax: [phone]      Assistance purchasing medications?:    Assistance provided by Case Management: None at this time    Does patient want to participate in local refill/ meds to beds program?: Yes    Meds To Beds General Rules:  1. Can ONLY be done Monday- Friday between 8:30am-5pm  2. Prescription(s) must be in pharmacy by 3pm to be filled same day  3. Copy of patient's insurance/ prescription drug card and patient face sheet must be sent along with the prescription(s)  4. Cost of Rx cannot be added to hospital bill. If financial assistance is needed, please contact unit  or ;  or  CANNOT provide pharmacy voucher for patients co-pays  5.  Patients can then  the prescription on their way out of the hospital at discharge, or pharmacy can deliver to the bedside if staff is available. (payment due at time of pick-up or delivery - cash, check, or card accepted) Able to afford home medications/ co-pay costs: Yes    ADLS:  Current PT AM-PAC Score:   /24  Current OT AM-PAC Score:   /24      DISCHARGE Disposition: Home- No Services Needed    LOC at discharge: Not Applicable  DANITA Completed: Not Indicated    Notification completed in HENS/PAS?:  Not Applicable    IMM Completed:   Not Indicated    Transportation:  Transportation PLAN for discharge: EMS transportation   Mode of Transport: Ambulance stretcher - BLS  Reason for medical transport: paraplegic, AKA, sacral ulcer that he can't put pressure on  Name of [address] [place] [address], [address]:  [facility]   Phone: [phone]  Time of Transport: 2:45 pm    Transport form completed: Yes    Home Care:  [address] ordered at discharge: No  Home Care Agency: Not Applicable  Orders faxed: No    Durable Medical Equipment:  DME Provider: none provided  Equipment obtained during hospitalization: none provided    Home Oxygen and Respiratory Equipment:  Oxygen needed at discharge?: Not [address]: Not Applicable  Portable tank available for discharge?: Not Indicated    Dialysis:  Dialysis patient: No    Dialysis Center:  Not Applicable    Referrals made at [facility] for outpatient continued care:  Not Applicable    Additional CM Notes: Patient lives with his wife at [facility]. Patient, wife and daughter are all in agreement with discharging home, where they have 24 hour private pay care. Patient and family reported no other CM needs at discharge. Transportation has been arranged through [address] [place] [address] [phone] at 2:45 pm. Daughter, She Schofield, is aware of transportation time.     The Plan for Transition of Care is related to the following treatment goals of Melena [K92.1]  Hypernatremia [E87.0]  Upper GI bleed [K92.2]  Failure to thrive in adult [R62.7]    The Patient and/or patient representative Jessica Seth and his family were provided with a choice of provider and agrees with the discharge plan Yes    Freedom of choice list was provided with basic dialogue that supports the patient's individualized plan of care/goals and shares the quality data associated with the providers.  Yes    Care Transitions patient: No    NAVARRO Raygoza  [facility]  Case Management Department  Ph: [phone]  Fax: [phone]

## 2022-08-03 NOTE — PROGRESS NOTES
Physician Progress Note      PATIENT:               Xena Moseley  CSN #:                  [id number]  :                       1935  ADMIT DATE:       2022 7:45 PM  [address] [place] DATE:  RESPONDING  PROVIDER #:        Brian Wolff MD          QUERY TEXT:    Pt admitted with AMS. Pt noted to have UTI. If possible, please document in   the progress notes and discharge summary if you are evaluating and / or   treating any of the following: The medical record reflects the following:  Risk Factors: 81 y/o with UTI  Clinical Indicators: Per H&P: \"Altered mental status:-Secondary to most likely   UTI-Patient presented with altered mental status. Patient has been having   decreased oral intake and fatigue. \"  Treatment: urine culture, Ceftriaxone, Dueñas exchanged in ED  Options provided:  -- Acute Metabolic encephalopathy due to UTI  -- Other - I will add my own diagnosis  -- Disagree - Not applicable / Not valid  -- Disagree - Clinically unable to determine / Unknown  -- Refer to Clinical Documentation Reviewer    PROVIDER RESPONSE TEXT:    This patient has Acute metabolic encephalopathy due to UTI. Query created by: Grady Gray on 2022 11:09 AM      QUERY TEXT:    Pt admitted with AMS and UTI. Pt noted to have Chronic indwelling Dueñas   catheter. If possible, please document in progress notes and discharge summary   the relationship, if any, between the following. The medical record reflects the following:  Risk Factors: 81 y/o with UTI, Paraplegia and chronic indwelling Dueñas   catheter  Clinical Indicators: Per H&P: \"Altered mental status:-Secondary to most likely   UTI-Patient presented with altered mental status. Patient has been having   decreased oral intake and fatigue. \"  Per ED: \"He was sent in from his primary   care office to have his Dueñas catheter switched. Urine in tubing and bag   looks quite purulent. Patient is nontoxic-appearing but is chronically ill. \"  Treatment: urine culture, Ceftriaxone, Dueñas exchanged in ED  Options provided:  -- UTI due to chronic indwelling Dueñas catheter  -- UTI unrelated to chronic indwelling Dueñas catheter  -- Other - I will add my own diagnosis  -- Disagree - Not applicable / Not valid  -- Disagree - Clinically unable to determine / Unknown  -- Refer to Clinical Documentation Reviewer    PROVIDER RESPONSE TEXT:    This patient has UTI due to chronic indwelling Dueñas catheter.     Query created by: Kit Morin on 8/2/2022 11:13 AM      Electronically signed by:  Talita Burt MD 8/3/2022 11:17 AM

## 2022-08-03 NOTE — PROGRESS NOTES
Orientation: A&Ox3, disoriented to time     SpO2: RA    Ambulation: bedrest     Skin: Stage 4 L buttocks,wound care consulted     Pain: pt denies    Gtts: D5% &NS 0.45% @ 100ml/hr     All needs met at this time. Bed in lowest position, bed alarm on, call light and overhead table within reach. Will continue to monitor.        Latest Reference Range & Units 8/2/22 20:32   Hemoglobin Quant 13.5 - 17.5 g/dL 9.1 (L)   Hematocrit 40.5 - 52.5 % 28.6 (L)   (L): Data is abnormally low

## 2022-08-03 NOTE — PLAN OF CARE
Problem: Discharge Planning  Goal: Discharge to home or other facility with appropriate resources  Outcome: Completed  Flowsheets (Taken 8/3/2022 0806)  Discharge to home or other facility with appropriate resources:   Identify barriers to discharge with patient and caregiver   Arrange for needed discharge resources and transportation as appropriate   Identify discharge learning needs (meds, wound care, etc)   Refer to discharge planning if patient needs post-hospital services based on physician order or complex needs related to functional status, cognitive ability or social support system     Problem: Skin/Tissue Integrity  Goal: Absence of new skin breakdown  Description: 1. Monitor for areas of redness and/or skin breakdown  2. Assess vascular access sites hourly  3. Every 4-6 hours minimum:  Change oxygen saturation probe site  4. Every 4-6 hours:  If on nasal continuous positive airway pressure, respiratory therapy assess nares and determine need for appliance change or resting period.   Outcome: Completed     Problem: Safety - Adult  Goal: Free from fall injury  Outcome: Completed  Flowsheets (Taken 8/3/2022 0852)  Free From Fall Injury:   Instruct family/caregiver on patient safety   Based on caregiver fall risk screen, instruct family/caregiver to ask for assistance with transferring infant if caregiver noted to have fall risk factors     Problem: ABCDS Injury Assessment  Goal: Absence of physical injury  Outcome: Completed  Flowsheets (Taken 8/3/2022 0852)  Absence of Physical Injury: Implement safety measures based on patient assessment

## 2022-09-08 ENCOUNTER — HOSPITAL ENCOUNTER (EMERGENCY)
Age: 87
Discharge: NURSING, REHAB OR LONG-TERM CARE | End: 2022-09-09
Attending: STUDENT IN AN ORGANIZED HEALTH CARE EDUCATION/TRAINING PROGRAM
Payer: MEDICARE

## 2022-09-08 DIAGNOSIS — T83.9XXA FOLEY CATHETER PROBLEM, INITIAL ENCOUNTER (HCC): ICD-10-CM

## 2022-09-08 DIAGNOSIS — E86.0 DEHYDRATION: Primary | ICD-10-CM

## 2022-09-08 LAB
ALBUMIN SERPL-MCNC: 2.9 G/DL (ref 3.4–5)
ALP BLD-CCNC: 83 U/L (ref 40–129)
ALT SERPL-CCNC: 13 U/L (ref 10–40)
ANION GAP SERPL CALCULATED.3IONS-SCNC: 11 MMOL/L (ref 3–16)
AST SERPL-CCNC: 14 U/L (ref 15–37)
BACTERIA: ABNORMAL /HPF
BASOPHILS ABSOLUTE: 0.1 K/UL (ref 0–0.2)
BASOPHILS RELATIVE PERCENT: 0.9 %
BILIRUB SERPL-MCNC: 0.3 MG/DL (ref 0–1)
BILIRUBIN DIRECT: <0.2 MG/DL (ref 0–0.3)
BILIRUBIN URINE: NEGATIVE
BILIRUBIN, INDIRECT: ABNORMAL MG/DL (ref 0–1)
BLOOD, URINE: ABNORMAL
BUN BLDV-MCNC: 21 MG/DL (ref 7–20)
CALCIUM SERPL-MCNC: 8.3 MG/DL (ref 8.3–10.6)
CHLORIDE BLD-SCNC: 104 MMOL/L (ref 99–110)
CLARITY: CLEAR
CO2: 21 MMOL/L (ref 21–32)
COLOR: YELLOW
CREAT SERPL-MCNC: <0.5 MG/DL (ref 0.8–1.3)
EOSINOPHILS ABSOLUTE: 0.8 K/UL (ref 0–0.6)
EOSINOPHILS RELATIVE PERCENT: 9.2 %
EPITHELIAL CELLS, UA: ABNORMAL /HPF (ref 0–5)
GFR AFRICAN AMERICAN: >60
GFR NON-AFRICAN AMERICAN: >60
GLUCOSE BLD-MCNC: 130 MG/DL (ref 70–99)
GLUCOSE URINE: NEGATIVE MG/DL
HCT VFR BLD CALC: 26.2 % (ref 40.5–52.5)
HEMOGLOBIN: 8.3 G/DL (ref 13.5–17.5)
KETONES, URINE: NEGATIVE MG/DL
LACTIC ACID: 1.7 MMOL/L (ref 0.4–2)
LEUKOCYTE ESTERASE, URINE: ABNORMAL
LYMPHOCYTES ABSOLUTE: 1.2 K/UL (ref 1–5.1)
LYMPHOCYTES RELATIVE PERCENT: 13.3 %
MAGNESIUM: 2.1 MG/DL (ref 1.8–2.4)
MCH RBC QN AUTO: 28.4 PG (ref 26–34)
MCHC RBC AUTO-ENTMCNC: 31.7 G/DL (ref 31–36)
MCV RBC AUTO: 89.7 FL (ref 80–100)
MICROSCOPIC EXAMINATION: YES
MONOCYTES ABSOLUTE: 0.7 K/UL (ref 0–1.3)
MONOCYTES RELATIVE PERCENT: 8.3 %
NEUTROPHILS ABSOLUTE: 6 K/UL (ref 1.7–7.7)
NEUTROPHILS RELATIVE PERCENT: 68.3 %
NITRITE, URINE: NEGATIVE
PDW BLD-RTO: 19.4 % (ref 12.4–15.4)
PH UA: 6 (ref 5–8)
PLATELET # BLD: 313 K/UL (ref 135–450)
PMV BLD AUTO: 7.7 FL (ref 5–10.5)
POTASSIUM REFLEX MAGNESIUM: 3.4 MMOL/L (ref 3.5–5.1)
PROTEIN UA: 30 MG/DL
RBC # BLD: 2.92 M/UL (ref 4.2–5.9)
RBC UA: ABNORMAL /HPF (ref 0–4)
SODIUM BLD-SCNC: 136 MMOL/L (ref 136–145)
SPECIFIC GRAVITY UA: 1.02 (ref 1–1.03)
TOTAL PROTEIN: 5.7 G/DL (ref 6.4–8.2)
URINE REFLEX TO CULTURE: YES
URINE TYPE: ABNORMAL
UROBILINOGEN, URINE: 1 E.U./DL
WBC # BLD: 8.8 K/UL (ref 4–11)
WBC UA: ABNORMAL /HPF (ref 0–5)

## 2022-09-08 PROCEDURE — 81001 URINALYSIS AUTO W/SCOPE: CPT

## 2022-09-08 PROCEDURE — 51702 INSERT TEMP BLADDER CATH: CPT

## 2022-09-08 PROCEDURE — 87086 URINE CULTURE/COLONY COUNT: CPT

## 2022-09-08 PROCEDURE — 99283 EMERGENCY DEPT VISIT LOW MDM: CPT

## 2022-09-08 PROCEDURE — 80076 HEPATIC FUNCTION PANEL: CPT

## 2022-09-08 PROCEDURE — 85025 COMPLETE CBC W/AUTO DIFF WBC: CPT

## 2022-09-08 PROCEDURE — 87186 SC STD MICRODIL/AGAR DIL: CPT

## 2022-09-08 PROCEDURE — 36415 COLL VENOUS BLD VENIPUNCTURE: CPT

## 2022-09-08 PROCEDURE — 83605 ASSAY OF LACTIC ACID: CPT

## 2022-09-08 PROCEDURE — 80048 BASIC METABOLIC PNL TOTAL CA: CPT

## 2022-09-08 PROCEDURE — 87077 CULTURE AEROBIC IDENTIFY: CPT

## 2022-09-08 PROCEDURE — 83735 ASSAY OF MAGNESIUM: CPT

## 2022-09-08 RX ORDER — SODIUM CHLORIDE, SODIUM LACTATE, POTASSIUM CHLORIDE, AND CALCIUM CHLORIDE .6; .31; .03; .02 G/100ML; G/100ML; G/100ML; G/100ML
500 INJECTION, SOLUTION INTRAVENOUS ONCE
Status: DISCONTINUED | OUTPATIENT
Start: 2022-09-08 | End: 2022-09-09 | Stop reason: HOSPADM

## 2022-09-08 ASSESSMENT — PAIN - FUNCTIONAL ASSESSMENT: PAIN_FUNCTIONAL_ASSESSMENT: NONE - DENIES PAIN

## 2022-09-09 VITALS
SYSTOLIC BLOOD PRESSURE: 119 MMHG | HEART RATE: 81 BPM | TEMPERATURE: 98.1 F | DIASTOLIC BLOOD PRESSURE: 63 MMHG | OXYGEN SATURATION: 98 % | RESPIRATION RATE: 16 BRPM

## 2022-09-09 NOTE — ED NOTES
Pt arrives via EMS for concern of catheter displacement. Home health aid stated pt catheter was replaced and was not draining urine. Upon arrival to ER pt catheter was draining urine.       Shawn Montano RN  09/08/22 2946

## 2022-09-09 NOTE — ED PROVIDER NOTES
[address] [facility]          ATTENDING PHYSICIAN NOTE       Date of evaluation: 9/8/2022    ADDENDUM:      Care of this patient was assumed from Dr. Brittnee Hunt. The patient was seen for Other (Catheter care. New fitzgerald placed earlier today and was concerned that was not in place d/t lack of output. Pt. Present with output and seems in place. Pt. Has no complaints at this time. )  . The patient's initial evaluation and plan have been discussed with the prior provider who initially evaluated the patient. Nursing Notes, Past Medical Hx, Past Surgical Hx, Social Hx, Allergies, and Family Hx were all reviewed.     Diagnostic Results         RADIOLOGY:  No orders to display       LABS:   Results for orders placed or performed during the hospital encounter of 09/08/22   CBC with Auto Differential   Result Value Ref Range    WBC 8.8 4.0 - 11.0 K/uL    RBC 2.92 (L) 4.20 - 5.90 M/uL    Hemoglobin 8.3 (L) 13.5 - 17.5 g/dL    Hematocrit 26.2 (L) 40.5 - 52.5 %    MCV 89.7 80.0 - 100.0 fL    MCH 28.4 26.0 - 34.0 pg    MCHC 31.7 31.0 - 36.0 g/dL    RDW 19.4 (H) 12.4 - 15.4 %    Platelets 762 107 - 693 K/uL    MPV 7.7 5.0 - 10.5 fL    Neutrophils % 68.3 %    Lymphocytes % 13.3 %    Monocytes % 8.3 %    Eosinophils % 9.2 %    Basophils % 0.9 %    Neutrophils Absolute 6.0 1.7 - 7.7 K/uL    Lymphocytes Absolute 1.2 1.0 - 5.1 K/uL    Monocytes Absolute 0.7 0.0 - 1.3 K/uL    Eosinophils Absolute 0.8 (H) 0.0 - 0.6 K/uL    Basophils Absolute 0.1 0.0 - 0.2 K/uL   BMP w/ Reflex to MG   Result Value Ref Range    Sodium 136 136 - 145 mmol/L    Potassium reflex Magnesium 3.4 (L) 3.5 - 5.1 mmol/L    Chloride 104 99 - 110 mmol/L    CO2 21 21 - 32 mmol/L    Anion Gap 11 3 - 16    Glucose 130 (H) 70 - 99 mg/dL    BUN 21 (H) 7 - 20 mg/dL    Creatinine <0.5 (L) 0.8 - 1.3 mg/dL    GFR Non-African American >60 >60    GFR African American >60 >60    Calcium 8.3 8.3 - 10.6 mg/dL   Hepatic Function Panel   Result Value Ref Range    Total Protein 5.7 (L) 6.4 - 8.2 g/dL    Albumin 2.9 (L) 3.4 - 5.0 g/dL    Alkaline Phosphatase 83 40 - 129 U/L    ALT 13 10 - 40 U/L    AST 14 (L) 15 - 37 U/L    Total Bilirubin 0.3 0.0 - 1.0 mg/dL    Bilirubin, Direct <0.2 0.0 - 0.3 mg/dL    Bilirubin, Indirect see below 0.0 - 1.0 mg/dL   Lactic Acid   Result Value Ref Range    Lactic Acid 1.7 0.4 - 2.0 mmol/L   Urinalysis with Reflex to Culture    Specimen: Urine   Result Value Ref Range    Color, UA Yellow Straw/Yellow    Clarity, UA Clear Clear    Glucose, Ur Negative Negative mg/dL    Bilirubin Urine Negative Negative    Ketones, Urine Negative Negative mg/dL    Specific Gravity, UA 1.020 1.005 - 1.030    Blood, Urine MODERATE (A) Negative    pH, UA 6.0 5.0 - 8.0    Protein, UA 30 (A) Negative mg/dL    Urobilinogen, Urine 1.0 <2.0 E.U./dL    Nitrite, Urine Negative Negative    Leukocyte Esterase, Urine MODERATE (A) Negative    Microscopic Examination YES     Urine Type Voided     Urine Reflex to Culture Yes    Microscopic Urinalysis   Result Value Ref Range    WBC, UA 21-50 (A) 0 - 5 /HPF    RBC, UA 11-20 (A) 0 - 4 /HPF    Epithelial Cells, UA 2-5 0 - 5 /HPF    Bacteria, UA 4+ (A) None Seen /HPF   Magnesium   Result Value Ref Range    Magnesium 2.10 1.80 - 2.40 mg/dL       RECENT VITALS:  BP: 103/63, Temp: 98.1 °F (36.7 °C), Heart Rate: 80, Resp: 18, SpO2: 97 %     Procedures         ED Course          The patient was given the following medications:  Orders Placed This Encounter   Medications    lactated ringers bolus       CONSULTS:  None    MEDICAL DECISION MAKING / ASSESSMENT / Johnny Toledo is a 82 y.o. male with a complaint of absent urine output from the catheter. Catheter was changed and he had approximate 50 cc of urine draining from the new catheter. Signed out pending p.o. and IV fluids to ensure that his catheter is draining adequately. On reassessment, the patient had over 400 cc of yellow urine in the bag.   Repeat bedside ultrasound showed no distended bladder. At this point we discharge, likely reason for no urine output was dehydration and mild hypokalemia which has been corrected with oral fluid supplementation here. We discussed the goal for oral intake with the daughter, and will arrange transport back to his home. Clinical Impression     1. Dehydration    2.  Dueñas catheter problem, initial encounter [facility])        Disposition     PATIENT REFERRED TO:  Jihan Moon MD  [address] [address]  [phone]      As needed    [facility] Emergency Department  [address] [place] [address]  [phone]    If symptoms worsen    DISCHARGE MEDICATIONS:  New Prescriptions    No medications on file       DISPOSITION Decision To Discharge 09/08/2022 11:49:35 PM         Chris Boyd MD  09/08/22 8340

## 2022-09-09 NOTE — ED NOTES
Dr Kristin Washburn okay with PO fluids to ensure urine production in catheter bag.       Arlen Poole RN  09/08/22 2191

## 2022-09-09 NOTE — DISCHARGE INSTRUCTIONS
Urine culture has been sent on his urinalysis. You may receive a call if it shows an active infection to start antibiotics. Otherwise ensure he is drinking at least 64 ounces of liquid a day, and return to the ER for worsening mental status, nausea or vomiting, decreased urine output or other concerning symptoms.

## 2022-09-11 LAB
ORGANISM: ABNORMAL
ORGANISM: ABNORMAL
URINE CULTURE, ROUTINE: ABNORMAL

## 2022-09-14 NOTE — ED PROVIDER NOTES
[facility]), Prostate cancer ([facility] [address] and Wears glasses. 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 His family history includes Alzheimer's Disease in his mother; Heart Disease in his father. He reports that he quit smoking about 46 years ago. He has never used smokeless tobacco. He reports that he does not currently use alcohol. He reports that he does not use drugs. Medications     Discharge Medication List as of 9/9/2022  1:08 AM        CONTINUE these medications which have NOT CHANGED    Details   pantoprazole (PROTONIX) 20 MG tablet Take 1 tablet by mouth in the morning., Disp-30 tablet, R-3Normal      fluticasone-umeclidin-vilant (TRELEGY ELLIPTA) 100-62.5-25 MCG/INH AEPB Inhale 1 puff into the lungs dailyHistorical Med      Multiple Vitamins-Minerals (THERAPEUTIC MULTIVITAMIN-MINERALS) tablet Take 1 tablet by mouth in the morning. Historical Med      Probiotic Product (PROBIOTIC DAILY PO) Take 1 capsule by mouth dailyHistorical Med      sodium chloride nebulizer 0.9 % solution Take 3 mLs by nebulization in the morning. Historical Med      senna (SENOKOT) 8.6 MG tablet Take 1 tablet by mouth in the morning and 1 tablet before bedtime. Historical Med      Cyanocobalamin (VITAMIN B 12 PO) Take 1 tablet by mouth dailyHistorical Med      ferrous sulfate (IRON 325) 325 (65 Fe) MG tablet Take 325 mg by mouth every other dayHistorical Med      aspirin 81 MG tablet Take 81 mg by mouth dailyHistorical Med      albuterol sulfate  (90 BASE) MCG/ACT inhaler Inhale 2 puffs into the lungs as needed for WheezingHistorical Med             Allergies     He has No Known Allergies.     Physical Exam     INITIAL VITALS: BP: 133/78, Temp: 98.1 °F (36.7 °C), Heart Rate: 80, Resp: 18, SpO2: 97 %     General:  Well appearing. No acute distress. Non-toxic appearing    Eyes:  Pupils equally round. No discharge from eyes. ENT:  No discharge from nose. OP clear. Neck:  Supple. Nontender. Pulmonary:   Non-labored breathing. Breath sounds clear bilaterally. Cardiac:  Regular rate and rhythm. No murmurs. Abdomen:  Soft. Non-tender throughout including suprapubic region Non-distended. No masses including suprapubic region  : Dueñas in place with normal external male genitalia. Clear yellow UOP in bag. Musculoskeletal:  No long bone deformity. No ankle or wrist deformity. Vascular:  Extremities warm and perfused. Skin:  No rash. Warm. Neuro: Alert and conversant. CN II-XII grossly intact. Speech and mentation normal.  Moves BUE  Sensation grossly intact to light touch. Extremities:  No peripheral edema. LE symmetric.     Diagnostic Results     EKG   None indicated    RADIOLOGY:  No orders to display       LABS:   Results for orders placed or performed during the hospital encounter of 09/08/22   Culture, Urine    Specimen: Urine, clean catch   Result Value Ref Range    Urine Culture, Routine **This is NOT a Corrected Report** (A)     Organism Klebsiella pneumoniae (A)     Urine Culture, Routine >100,000 CFU/ml     Organism Pseudomonas aeruginosa (A)     Urine Culture, Routine 50,000 CFU/ml        Susceptibility    Klebsiella pneumoniae - BACTERIAL SUSCEPTIBILITY PANEL BY SIRISHA     amoxicillin-clavulanate <=8/4 Sensitive mcg/mL     ampicillin >16 Resistant mcg/mL     ampicillin-sulbactam <=8/4 Sensitive mcg/mL     ceFAZolin <=2 Sensitive mcg/mL     cefepime <=2 Sensitive mcg/mL     cefTRIAXone <=1 Sensitive mcg/mL     cefuroxime <=4 Sensitive mcg/mL     ciprofloxacin <=1 Sensitive mcg/mL     ertapenem <=0.5 Sensitive mcg/mL     gentamicin <=4 Sensitive mcg/mL     meropenem <=1 Sensitive mcg/mL     nitrofurantoin 64 Intermediate mcg/mL     piperacillin-tazobactam <=16 Sensitive mcg/mL Acid 1.7 0.4 - 2.0 mmol/L   Urinalysis with Reflex to Culture    Specimen: Urine   Result Value Ref Range    Color, UA Yellow Straw/Yellow    Clarity, UA Clear Clear    Glucose, Ur Negative Negative mg/dL    Bilirubin Urine Negative Negative    Ketones, Urine Negative Negative mg/dL    Specific Gravity, UA 1.020 1.005 - 1.030    Blood, Urine MODERATE (A) Negative    pH, UA 6.0 5.0 - 8.0    Protein, UA 30 (A) Negative mg/dL    Urobilinogen, Urine 1.0 <2.0 E.U./dL    Nitrite, Urine Negative Negative    Leukocyte Esterase, Urine MODERATE (A) Negative    Microscopic Examination YES     Urine Type Voided     Urine Reflex to Culture Yes    Microscopic Urinalysis   Result Value Ref Range    WBC, UA 21-50 (A) 0 - 5 /HPF    RBC, UA 11-20 (A) 0 - 4 /HPF    Epithelial Cells, UA 2-5 0 - 5 /HPF    Bacteria, UA 4+ (A) None Seen /HPF   Magnesium   Result Value Ref Range    Magnesium 2.10 1.80 - 2.40 mg/dL       ED BEDSIDE ULTRASOUND:  None performed    Procedures     None performed    ED Course     Nursing Notes, Past Medical Hx, Past Surgical Hx, Social Hx, Allergies, and Family Hx were reviewed. The patient was given the following medications:  Orders Placed This Encounter   Medications    DISCONTD: lactated ringers bolus       CONSULTS:  None    MEDICAL DECISIONMAKING / ASSESSMENT / Geovani Willy is a 82 y.o. male with decreased UOP    Pt was hemodynamically stable and afebrile in the Emergency Department. Given urine now present in [place] after transport, PVR obtained which showed no evidence of urinary retention. Will check labs to ensure no RADHA. BMP reassuring against electrolyte abnormality or RADHA  Hepatic panel unremarkable  Lactic not elevated, making systemic infection or hypoperfusion less likely. CBC without leukocytosis    UA is indeterminate with moderate leuks and 20-50 WBC but also 10-20 reds and recent fitzgerald manipulation.   No other evidence of infection, will not treat but will send urine culture    Patient is signed out at this time to the oncoming provider with plan for monitoring of urine output with continued PO intake to ensure additional urine output. Oncoming provider will reassess and determine appropriate disposition      Clinical Impression     1. Dehydration    2.  Dueñas catheter problem, initial encounter [facility])          Disposition     PATIENT REFERRED TO:  Anthony Marlow MD  [address] [facility]  [phone]      As needed    [facility] Emergency [place] [address]  [address]  [phone]    If symptoms worsen      DISCHARGE MEDICATIONS:  Discharge Medication List as of 9/9/2022  1:08 AM          DISPOSITION pending -please see Rosalinda Espinal MD  09/16/22 1137

## 2022-10-03 NOTE — ED NOTES
Pt daughter called back and states the patient has been prescribed abx from his PCP due Positive urine cultures. Daughter states pt is doing much better and she will have him follow up with PCP.       Gaye Pederson RN  10/03/22 8963

## (undated) DEVICE — SOLUTION IV IRRIG POUR BRL 0.9% SODIUM CHL 2F7124

## (undated) DEVICE — T-DRAPE,EXTREMITY,STERILE: Brand: MEDLINE

## (undated) DEVICE — SHEET,DRAPE,53X77,STERILE: Brand: MEDLINE

## (undated) DEVICE — MAJOR SET UP PK

## (undated) DEVICE — GLOVE,SURG,SENSICARE SLT,LF,PF,8: Brand: MEDLINE

## (undated) DEVICE — SPONGE GZ W4XL4IN COT 12 PLY TYP VII WVN C FLD DSGN

## (undated) DEVICE — BANDAGE COMPR W6INXL10YD ST M E WHITE/BEIGE

## (undated) DEVICE — Z INACTIVE USE 2660664 SOLUTION IRRIG 3000ML 0.9% SOD CHL USP UROMATIC PLAS CONT

## (undated) DEVICE — Z DISCONTINUED USE 2276105 GOWN PROTCT UNIV CHST W28IN L49IN SL 24IN BLU SPUNBOND FLM

## (undated) DEVICE — Z DISCONTINUED BY MEDLINE USE 2711682 TRAY SKIN PREP DRY W/ PREM GLV

## (undated) DEVICE — ELECTRODE PT RET AD L9FT HI MOIST COND ADH HYDRGEL CORDED

## (undated) DEVICE — GAUZE,PACKING STRIP,IODOFORM,1/2"X5YD,ST: Brand: CURAD

## (undated) DEVICE — BLADE SAW SAG OSCIL LNG MED 9X31MM

## (undated) DEVICE — COVER LT HNDL BLU PLAS

## (undated) DEVICE — SYRINGE MED 10ML LUERLOCK TIP W/O SFTY DISP

## (undated) DEVICE — ELECTRODE ECG MONITR FOAM TEAR DROP ADLT RED

## (undated) DEVICE — 4-PORT MANIFOLD: Brand: NEPTUNE 2

## (undated) DEVICE — BANDAGE COBAN 6 IN WND 6INX5YD FOAM

## (undated) DEVICE — GOWN SIRUS NONREIN LG W/TWL: Brand: MEDLINE INDUSTRIES, INC.

## (undated) DEVICE — SOLUTION SCRB 4OZ 10% POVIDONE IOD ANTIMIC BTL

## (undated) DEVICE — INTENDED FOR TISSUE SEPARATION, AND OTHER PROCEDURES THAT REQUIRE A SHARP SURGICAL BLADE TO PUNCTURE OR CUT.: Brand: BARD-PARKER ® STAINLESS STEEL BLADES

## (undated) DEVICE — CANNULA SAMP CO2 AD GRN 7FT CO2 AND 7FT O2 TBNG UNIV CONN

## (undated) DEVICE — BANDAGE COMPR W4INXL12FT E DISP ESMARCH EVEN

## (undated) DEVICE — UNDERGLOVE SURG SZ 8 BLU LTX FREE SYN POLYISOPRENE POLYMER

## (undated) DEVICE — FORCEPS BX L240CM JAW DIA2.8MM L CAP W/ NDL MIC MESH TOOTH

## (undated) DEVICE — SOLUTION PREP POVIDONE IOD FOR SKIN MUCOUS MEM PRIOR TO

## (undated) DEVICE — CONMED SCOPE SAVER BITE BLOCK, 20X27 MM: Brand: SCOPE SAVER

## (undated) DEVICE — MERCY HEALTH WEST TURNOVER: Brand: MEDLINE INDUSTRIES, INC.

## (undated) DEVICE — PAD,ABDOMINAL,8"X7.5",STERILE,LF,1/PK: Brand: MEDLINE

## (undated) DEVICE — PADDING UNDERCAST W4INXL4YD 100% COT CRIMPED FINISH WBRL II

## (undated) DEVICE — CANISTER, RIGID, 1200CC: Brand: MEDLINE INDUSTRIES, INC.